# Patient Record
Sex: FEMALE | Race: WHITE | Employment: UNEMPLOYED | URBAN - METROPOLITAN AREA
[De-identification: names, ages, dates, MRNs, and addresses within clinical notes are randomized per-mention and may not be internally consistent; named-entity substitution may affect disease eponyms.]

---

## 2017-10-24 PROBLEM — I21.4 NSTEMI (NON-ST ELEVATED MYOCARDIAL INFARCTION) (HCC): Status: ACTIVE | Noted: 2017-10-24

## 2017-11-16 ENCOUNTER — TELEPHONE (OUTPATIENT)
Dept: CARDIOTHORACIC SURGERY | Age: 52
End: 2017-11-16

## 2017-11-16 NOTE — TELEPHONE ENCOUNTER
Patient underwent CABG x6 on 10/30/17 with Dr. Crescencio Fuchs. She was discharged home with home care on 11/8/17. I called her today for her surgical follow up. She reports that she is doing well. Denies SOB, chest pain, or palpitations. Denies edema or fever. She wears her TERESITA hose. She is using her IS and get it up to 1250. I encouraged continued use of this as well as deep breathing. Having regular bowel movement, glucose levels WNL Her only complaint is that food just doesn't taste right and so she's lost some weight since this all happened. She does report that she is still eating though and getting adequate nutrition. Pain is well controlled with her current medications and she reports she's sleeping well. No further concerns or questions at this time. Follow up scheduled with Dr. Crescencio Fuchs for tomorrow.

## 2017-11-17 ENCOUNTER — OFFICE VISIT (OUTPATIENT)
Dept: CARDIOTHORACIC SURGERY | Age: 52
End: 2017-11-17

## 2017-11-17 VITALS
DIASTOLIC BLOOD PRESSURE: 66 MMHG | TEMPERATURE: 97.5 F | HEIGHT: 67 IN | HEART RATE: 97 BPM | WEIGHT: 176.4 LBS | OXYGEN SATURATION: 98 % | BODY MASS INDEX: 27.69 KG/M2 | SYSTOLIC BLOOD PRESSURE: 118 MMHG

## 2017-11-17 DIAGNOSIS — Z09 POSTOP CHECK: Primary | ICD-10-CM

## 2017-11-17 DIAGNOSIS — G89.18 POST-OP PAIN: Primary | ICD-10-CM

## 2017-11-17 PROCEDURE — 99024 POSTOP FOLLOW-UP VISIT: CPT | Performed by: THORACIC SURGERY (CARDIOTHORACIC VASCULAR SURGERY)

## 2017-11-17 RX ORDER — OXYCODONE HYDROCHLORIDE 5 MG/1
5 TABLET ORAL EVERY 6 HOURS PRN
Qty: 28 TABLET | Refills: 0 | Status: SHIPPED | OUTPATIENT
Start: 2017-11-17 | End: 2017-12-08

## 2017-11-17 RX ORDER — OXYCODONE HYDROCHLORIDE 5 MG/1
5 TABLET ORAL EVERY 4 HOURS PRN
COMMUNITY
End: 2017-12-08

## 2017-11-17 NOTE — PROGRESS NOTES
Progress Note    S/P CABGx6 10/30/17  MSI, CT sites x3 and right leg incision are healing without redness or purulence. CT sutures x3 removed without difficulty. Needs to make an appointment with Cardiology. Has not resumed smoking. Vital Signs:                                                 /66 (Site: Left Arm, Position: Sitting)   Pulse 97   Temp 97.5 °F (36.4 °C) (Oral)   Ht 5' 7\" (1.702 m)   Wt 176 lb 6.4 oz (80 kg)   SpO2 98%   BMI 27.63 kg/m²      Preop weight: 185 lb    CV: reg, wound c/d/i, sternum stable  Pulm: clear, decreased at bases  Abd: soft  Ext: warm. No edema. saph incision c/d/i. Medications:  Prior to Admission medications    Medication Sig Start Date End Date Taking? Authorizing Provider   oxyCODONE (ROXICODONE) 5 MG immediate release tablet Take 5 mg by mouth every 4 hours as needed for Pain . Yes Historical Provider, MD   acetaminophen (TYLENOL) 325 MG tablet Take 2 tablets by mouth every 4 hours as needed for Pain 11/3/17  Yes JEREMY Gomez   aspirin 325 MG EC tablet Take 1 tablet by mouth daily 11/4/17  Yes JEREMY Gomez   atorvastatin (LIPITOR) 40 MG tablet Take 1 tablet by mouth nightly 11/3/17  Yes JEREMY Gomez   docusate sodium (COLACE, DULCOLAX) 100 MG CAPS Take 100 mg by mouth 2 times daily as needed for Constipation 11/3/17  Yes JEREMY Gomez   famotidine (PEPCID) 20 MG tablet Take 1 tablet by mouth 2 times daily 11/3/17 12/3/17 Yes JEREMY Gomez   vilazodone HCl (VILAZODONE HCL) 40 MG TABS Take 40 mg by mouth daily   Yes Historical Provider, MD   sitaGLIPtan-metformin (JANUMET)  MG per tablet Take 1 tablet by mouth 2 times daily (with meals) 9/28/16  Yes Deja Camacho NP   levothyroxine (SYNTHROID) 200 MCG tablet Take 225 mcg by mouth daily. Yes Historical Provider, MD   glimepiride (AMARYL) 4 MG tablet Take 4 mg by mouth 2 times daily.    Yes Historical Provider, MD        Data Review:  CBC:   Lab Results   Component Value Date    WBC 11.7 11/08/2017    HGB 8.7 11/08/2017    HCT 26.5 11/08/2017    MCV 89.6 11/08/2017     11/08/2017     BMP:   Lab Results   Component Value Date     11/08/2017    K 3.8 11/08/2017    CL 93 11/08/2017    CO2 33 11/08/2017    BUN 15 11/08/2017    CREATININE 0.8 11/08/2017    CALCIUM 8.8 11/08/2017    MG 2.20 11/08/2017     PT/INR:   Lab Results   Component Value Date    PROTIME 13.4 11/08/2017    INR 1.19 11/08/2017         Assessment/Plan:  - lifting limit 5 lbs 11/30, then 10 lbs  - ok to resume driving  - can dispense with wearing compression hose  - cont , statin in keeping AHA guidelines for postcabg pts  - resume BB when bp/hr warrant  - no need to refill colace, pepcid  - refill pain med once more. Using sparingly.   - follow up 3 weeks    Talia Long MD  11/17/2017  9:53 AM

## 2017-11-17 NOTE — PATIENT INSTRUCTIONS
PREVENTION OF RECURRENT ATHEROSCLEROSIS:  A MESSAGE TO PATIENTS AND THEIR LOVED ONES FROM YOUR SURGEON    You have recently had successful surgery for atherosclerosis. Now your real work begins. Atherosclerosis (hardening of the arteries by cholesterol and fat deposits) can be slowed or stopped from further blocking your own arteries or the new bypass grafts by following \"risk factor management\". If you follow these principles carefully, you can reduce your chances of having heart attacks, strokes, and the need for future surgery. Your cardiologist and primary care doctor should follow these concepts, but your surgeons believe that this is so important that we want you to begin thinking about and following these concepts now. These are the important risk factors you and your doctors should follow carefully. The marked ones apply to YOU:    [x] SMOKING: Absolutely positively never smoke again. Keep your home and work place smoke­ free. Your doctors can prescribe patches, gum or other medications to help. [x] BLOOD PRESSURE CONTROL: Your blood pressure should be less than 140/90. If you have diabetes or kidney disease, your blood pressure should be less than 130/80. Weight reduction, exercise and medications can help you control high blood pressure. [x] CHOLESTEROL AND FATS: A diet low in saturated fat (< 7%) and low in cholesterol (< 200 mg/day), and weight reduction, and exercise, and medications as necessary can help you achieve these goals:  Low density (bad) cholesterol: <70 mg/dL (the lower, the better)   Triglycerides: <150 mg/dL (the lower, the better)   High density (good) cholesterol: >40 mg/dL (the higher, the better)  Make an appointment to see your primary care physician, Dr. Anca Bruner 2 months after your surgery to check your cholesterol profile.   [x] EXERCISE: Your cardiac rehabilitation program will help you work up to a regular make you sweat\" program of at least 30 minutes, at least 6 times per week, preferably daily. Make an appointment with your cardiologist Dr. Helio Persaud 3-4 weeks after your surgery. [x] WEIGHT REDUCTION: Your ideal body mass index is 18.5-24.9 kg/m2. Your body mass index is Body mass index is 27.63 kg/m². . You may calculate this by using the following formula: (weight in pounds X 0.45) / (height in inches X 0.0254) squared. A waist circumference of < 40 inches for men and < 35 inches for women is recommended. A 10% weight reduction can lower your risk of future events. [x] DIABETES: Your HbA1c should be <7%. Diet, exercise, weight reduction and proper medications can reduce your body's tendency toward high blood sugar. Check with your PCP for assistance. [x] PROPER MEDICATIONS:  [x] Aspirin  [] ACE inhibitor / ARB  (-opril / -sartan)  [] Beta-blocker (-olol or -ilol)  [x] Statin    Risk factor management works. The person for whom this is most important is YOU. Post this information on your refrigerator or other prominent place as a reminder to you. Please call or write if you have further questions. We want you and your operation to last for many more years.     MAY LIFT MORE THAN 5 LBS ON 11/30/17

## 2017-11-17 NOTE — TELEPHONE ENCOUNTER
Pt is s/p CABGx6 10/30/17 and is requesting a refill on Oxycodone 5 mg. This will be patient's first refill. Prescription routed to Dr. Ronda Cavanaugh to sign.

## 2017-12-08 ENCOUNTER — OFFICE VISIT (OUTPATIENT)
Dept: CARDIOTHORACIC SURGERY | Age: 52
End: 2017-12-08

## 2017-12-08 VITALS
OXYGEN SATURATION: 98 % | WEIGHT: 180.4 LBS | SYSTOLIC BLOOD PRESSURE: 118 MMHG | BODY MASS INDEX: 27.34 KG/M2 | HEART RATE: 105 BPM | DIASTOLIC BLOOD PRESSURE: 78 MMHG | HEIGHT: 68 IN | TEMPERATURE: 97.7 F

## 2017-12-08 DIAGNOSIS — Z09 POSTOP CHECK: Primary | ICD-10-CM

## 2017-12-08 PROCEDURE — 99024 POSTOP FOLLOW-UP VISIT: CPT | Performed by: THORACIC SURGERY (CARDIOTHORACIC VASCULAR SURGERY)

## 2017-12-19 ENCOUNTER — OFFICE VISIT (OUTPATIENT)
Dept: CARDIOLOGY CLINIC | Age: 52
End: 2017-12-19

## 2017-12-19 VITALS
BODY MASS INDEX: 27.58 KG/M2 | WEIGHT: 182 LBS | DIASTOLIC BLOOD PRESSURE: 60 MMHG | SYSTOLIC BLOOD PRESSURE: 128 MMHG | HEIGHT: 68 IN | OXYGEN SATURATION: 97 % | HEART RATE: 98 BPM

## 2017-12-19 DIAGNOSIS — I25.10 CORONARY ARTERY DISEASE INVOLVING NATIVE CORONARY ARTERY OF NATIVE HEART WITHOUT ANGINA PECTORIS: Primary | Chronic | ICD-10-CM

## 2017-12-19 DIAGNOSIS — E78.5 DYSLIPIDEMIA: ICD-10-CM

## 2017-12-19 DIAGNOSIS — Z95.1 S/P CABG X 6: ICD-10-CM

## 2017-12-19 DIAGNOSIS — I10 ESSENTIAL HYPERTENSION: ICD-10-CM

## 2017-12-19 PROCEDURE — 99214 OFFICE O/P EST MOD 30 MIN: CPT | Performed by: INTERNAL MEDICINE

## 2017-12-19 RX ORDER — METOPROLOL SUCCINATE 25 MG/1
25 TABLET, EXTENDED RELEASE ORAL DAILY
Qty: 30 TABLET | Refills: 3 | Status: SHIPPED | OUTPATIENT
Start: 2017-12-19 | End: 2021-08-11

## 2017-12-19 ASSESSMENT — ENCOUNTER SYMPTOMS
SHORTNESS OF BREATH: 0
COUGH: 0
ABDOMINAL PAIN: 0
COLOR CHANGE: 0
EYE PAIN: 0
EYE REDNESS: 0
BLOOD IN STOOL: 0
WHEEZING: 0
CHEST TIGHTNESS: 0

## 2017-12-19 NOTE — LETTER
factor modification. Recommend cardiac rehab. Advised to try Cortizone cream for rash. Fasting lipid profile, CMP prior to next visit. If you have questions, please do not hesitate to call me. I look forward to following Albino along with you.     Sincerely,        Julio Ron MD

## 2017-12-19 NOTE — PROGRESS NOTES
tachycardia, will add Toprol XL 25mg daily. Continue  ongoing risk factor modification. Recommend cardiac rehab. Advised to try Cortizone cream for rash. Fasting lipid profile, CMP prior to next visit.

## 2018-03-22 ENCOUNTER — OFFICE VISIT (OUTPATIENT)
Dept: CARDIOLOGY CLINIC | Age: 53
End: 2018-03-22

## 2018-03-22 VITALS
HEIGHT: 69 IN | WEIGHT: 194 LBS | HEART RATE: 94 BPM | OXYGEN SATURATION: 94 % | DIASTOLIC BLOOD PRESSURE: 60 MMHG | BODY MASS INDEX: 28.73 KG/M2 | SYSTOLIC BLOOD PRESSURE: 120 MMHG

## 2018-03-22 DIAGNOSIS — I25.10 CORONARY ARTERY DISEASE INVOLVING NATIVE CORONARY ARTERY OF NATIVE HEART WITHOUT ANGINA PECTORIS: Primary | Chronic | ICD-10-CM

## 2018-03-22 DIAGNOSIS — I10 ESSENTIAL HYPERTENSION: ICD-10-CM

## 2018-03-22 DIAGNOSIS — E78.00 PURE HYPERCHOLESTEROLEMIA: ICD-10-CM

## 2018-03-22 PROCEDURE — 99214 OFFICE O/P EST MOD 30 MIN: CPT | Performed by: INTERNAL MEDICINE

## 2018-03-22 ASSESSMENT — ENCOUNTER SYMPTOMS
ABDOMINAL PAIN: 0
EYE PAIN: 0
WHEEZING: 0
SHORTNESS OF BREATH: 1
CHEST TIGHTNESS: 0
COUGH: 0
EYE REDNESS: 0
BLOOD IN STOOL: 0
COLOR CHANGE: 0

## 2018-04-06 ENCOUNTER — TELEPHONE (OUTPATIENT)
Dept: CARDIOLOGY CLINIC | Age: 53
End: 2018-04-06

## 2018-09-06 LAB
AVERAGE GLUCOSE: NORMAL
HBA1C MFR BLD: 11.7 %

## 2021-08-11 ENCOUNTER — OFFICE VISIT (OUTPATIENT)
Dept: CARDIOLOGY CLINIC | Age: 56
End: 2021-08-11
Payer: COMMERCIAL

## 2021-08-11 VITALS
DIASTOLIC BLOOD PRESSURE: 86 MMHG | WEIGHT: 179.8 LBS | OXYGEN SATURATION: 97 % | HEART RATE: 92 BPM | SYSTOLIC BLOOD PRESSURE: 136 MMHG | BODY MASS INDEX: 26.63 KG/M2 | HEIGHT: 69 IN

## 2021-08-11 DIAGNOSIS — I25.10 CORONARY ARTERY DISEASE INVOLVING NATIVE CORONARY ARTERY OF NATIVE HEART WITHOUT ANGINA PECTORIS: Primary | ICD-10-CM

## 2021-08-11 DIAGNOSIS — E78.00 PURE HYPERCHOLESTEROLEMIA: ICD-10-CM

## 2021-08-11 DIAGNOSIS — I21.4 NSTEMI (NON-ST ELEVATED MYOCARDIAL INFARCTION) (HCC): ICD-10-CM

## 2021-08-11 DIAGNOSIS — Z95.1 S/P CABG X 6: ICD-10-CM

## 2021-08-11 PROCEDURE — 93000 ELECTROCARDIOGRAM COMPLETE: CPT | Performed by: INTERNAL MEDICINE

## 2021-08-11 PROCEDURE — 99214 OFFICE O/P EST MOD 30 MIN: CPT | Performed by: INTERNAL MEDICINE

## 2021-08-11 RX ORDER — INSULIN DEGLUDEC 200 U/ML
INJECTION, SOLUTION SUBCUTANEOUS
COMMUNITY
Start: 2021-06-19

## 2021-08-11 RX ORDER — ATORVASTATIN CALCIUM 80 MG/1
80 TABLET, FILM COATED ORAL DAILY
COMMUNITY
End: 2021-08-11

## 2021-08-11 RX ORDER — ROSUVASTATIN CALCIUM 40 MG/1
40 TABLET, COATED ORAL DAILY
Qty: 90 TABLET | Refills: 3 | Status: SHIPPED | OUTPATIENT
Start: 2021-08-11

## 2021-08-11 RX ORDER — BUSPIRONE HYDROCHLORIDE 5 MG/1
5 TABLET ORAL DAILY PRN
COMMUNITY
Start: 2021-03-23

## 2021-08-11 RX ORDER — ADALIMUMAB 10MG/0.1ML
KIT SUBCUTANEOUS
COMMUNITY

## 2021-08-11 RX ORDER — DULAGLUTIDE 1.5 MG/.5ML
INJECTION, SOLUTION SUBCUTANEOUS
COMMUNITY
Start: 2021-06-20

## 2021-08-11 RX ORDER — SULFAMETHOXAZOLE AND TRIMETHOPRIM 800; 160 MG/1; MG/1
1 TABLET ORAL 2 TIMES DAILY
COMMUNITY
Start: 2021-08-07

## 2021-08-11 RX ORDER — LISINOPRIL 5 MG/1
5 TABLET ORAL DAILY
Qty: 90 TABLET | Refills: 3 | Status: SHIPPED | OUTPATIENT
Start: 2021-08-11

## 2021-08-11 RX ORDER — INSULIN ASPART 100 [IU]/ML
INJECTION, SOLUTION INTRAVENOUS; SUBCUTANEOUS
COMMUNITY
Start: 2020-12-01

## 2021-08-11 RX ORDER — ASPIRIN 81 MG/1
81 TABLET ORAL DAILY
Qty: 90 TABLET | Refills: 0 | Status: SHIPPED | OUTPATIENT
Start: 2021-08-11

## 2021-08-11 RX ORDER — LEVOTHYROXINE SODIUM 112 UG/1
TABLET ORAL
COMMUNITY
Start: 2021-07-27

## 2021-08-11 RX ORDER — VILAZODONE HYDROCHLORIDE 40 MG/1
TABLET ORAL
COMMUNITY
Start: 2021-07-27
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
## 2021-08-11 NOTE — PATIENT INSTRUCTIONS
Patient Education     REDUCE ASPIRIN TO 81MG DAILY    SWITCH ATORVASTATIN TO CRESTOR 40MG NIGHTLY. START LISINOPRIL 5MG DAILY. Learning About Coronary Artery Disease (CAD)  What is coronary artery disease? Coronary artery disease is a condition that occurs when plaque builds up in the arteries that bring oxygen-rich blood to your heart. Plaque is a fatty substance made of cholesterol, calcium, and other substances in the blood. This process is called hardening of the arteries, or atherosclerosis. What happens when you have coronary artery disease? · Plaque may narrow the coronary arteries. Narrowed arteries cause poor blood flow. This can lead to angina symptoms such as chest pain or discomfort. If blood flow is completely blocked, you could have a heart attack. · You can slow and reduce the risk of future problems by making changes in your lifestyle. These include quitting smoking and eating heart-healthy foods. · Treatment, along with changes in your lifestyle, can help you live a longer and healthier life. How can you prevent coronary artery disease? · Do not smoke. It may be the best thing you can do to prevent coronary artery disease. If you need help quitting, talk to your doctor about stop-smoking programs and medicines. These can increase your chances of quitting for good. · Be active. Try to do moderate activity at least 2½ hours a week. Or try to do vigorous activity at least 1¼ hours a week. You may want to walk or try other activities, such as running, swimming, cycling, or playing tennis or team sports. · Eat heart-healthy foods. Eat more fruits and vegetables and less food that contains saturated and trans fats. Limit alcohol, sodium, and sweets. · Stay at a healthy weight. Lose weight if you need to. · Manage other health problems such as diabetes, high blood pressure, and high cholesterol. How is coronary artery disease treated?   · Your doctor will suggest that you make lifestyle changes. For example, your doctor may ask you to eat healthy foods, quit smoking, lose extra weight, and be more active. · You will take medicines that help prevent a heart attack. · Your doctor may suggest a procedure to open narrowed or blocked arteries. This is called angioplasty. Or your doctor may suggest using healthy blood vessels to create detours around narrowed or blocked arteries. This is called bypass surgery. Follow-up care is a key part of your treatment and safety. Be sure to make and go to all appointments, and call your doctor if you are having problems. It's also a good idea to know your test results and keep a list of the medicines you take. Where can you learn more? Go to https://[employer].Double[employer]. org and sign in to your [employer] account. Enter [phone] [id number] in the [employer] box to learn more about \"Learning About Coronary Artery Disease (CAD). \"     If you do not have an account, please click on the \"Sign Up Now\" link. Current as of: August 31, 2020               Content Version: 12.9  © 3381-5530 Healthwise, Incorporated. Care instructions adapted under license by [facility] ([facility]). If you have questions about a medical condition or this instruction, always ask your healthcare professional. [address] any warranty or liability for your use of this information.

## 2021-09-24 DIAGNOSIS — E78.00 PURE HYPERCHOLESTEROLEMIA: Primary | ICD-10-CM

## 2024-01-29 ENCOUNTER — HOSPITAL ENCOUNTER (INPATIENT)
Age: 59
LOS: 4 days | Discharge: HOME | End: 2024-02-02
Attending: STUDENT IN AN ORGANIZED HEALTH CARE EDUCATION/TRAINING PROGRAM | Admitting: ANESTHESIOLOGY
Payer: COMMERCIAL

## 2024-01-29 ENCOUNTER — APPOINTMENT (OUTPATIENT)
Dept: GENERAL RADIOLOGY | Age: 59
End: 2024-01-29
Payer: COMMERCIAL

## 2024-01-29 DIAGNOSIS — M79.602 PAIN OF LEFT UPPER EXTREMITY: ICD-10-CM

## 2024-01-29 DIAGNOSIS — R07.9 CHEST PAIN, UNSPECIFIED TYPE: Primary | ICD-10-CM

## 2024-01-29 LAB
ALBUMIN SERPL-MCNC: 4.1 G/DL (ref 3.4–5)
ALBUMIN/GLOB SERPL: 1.2 {RATIO} (ref 1.1–2.2)
ALP SERPL-CCNC: 109 U/L (ref 40–129)
ALT SERPL-CCNC: 19 U/L (ref 10–40)
ANION GAP SERPL CALCULATED.3IONS-SCNC: 7 MMOL/L (ref 3–16)
ANTI-XA UNFRAC HEPARIN: <0.1 IU/ML (ref 0.3–0.7)
APTT BLD: 28.1 SEC (ref 22.7–35.9)
AST SERPL-CCNC: 18 U/L (ref 15–37)
BASOPHILS # BLD: 0.1 K/UL (ref 0–0.2)
BASOPHILS NFR BLD: 1.1 %
BILIRUB SERPL-MCNC: <0.2 MG/DL (ref 0–1)
BUN SERPL-MCNC: 21 MG/DL (ref 7–20)
CALCIUM SERPL-MCNC: 9.5 MG/DL (ref 8.3–10.6)
CHLORIDE SERPL-SCNC: 98 MMOL/L (ref 99–110)
CO2 SERPL-SCNC: 29 MMOL/L (ref 21–32)
CREAT SERPL-MCNC: 1.1 MG/DL (ref 0.6–1.1)
DEPRECATED RDW RBC AUTO: 14.3 % (ref 12.4–15.4)
EOSINOPHIL # BLD: 0.4 K/UL (ref 0–0.6)
EOSINOPHIL NFR BLD: 2.9 %
GFR SERPLBLD CREATININE-BSD FMLA CKD-EPI: 58 ML/MIN/{1.73_M2}
GLUCOSE SERPL-MCNC: 152 MG/DL (ref 70–99)
HCT VFR BLD AUTO: 43.1 % (ref 36–48)
HGB BLD-MCNC: 14.2 G/DL (ref 12–16)
LYMPHOCYTES # BLD: 3.1 K/UL (ref 1–5.1)
LYMPHOCYTES NFR BLD: 25.7 %
MCH RBC QN AUTO: 29.3 PG (ref 26–34)
MCHC RBC AUTO-ENTMCNC: 32.9 G/DL (ref 31–36)
MCV RBC AUTO: 89 FL (ref 80–100)
MONOCYTES # BLD: 0.8 K/UL (ref 0–1.3)
MONOCYTES NFR BLD: 6.3 %
NEUTROPHILS # BLD: 7.7 K/UL (ref 1.7–7.7)
NEUTROPHILS NFR BLD: 64 %
PLATELET # BLD AUTO: 383 K/UL (ref 135–450)
PMV BLD AUTO: 7.5 FL (ref 5–10.5)
POTASSIUM SERPL-SCNC: 3.9 MMOL/L (ref 3.5–5.1)
PROT SERPL-MCNC: 7.4 G/DL (ref 6.4–8.2)
RBC # BLD AUTO: 4.84 M/UL (ref 4–5.2)
SODIUM SERPL-SCNC: 134 MMOL/L (ref 136–145)
TROPONIN, HIGH SENSITIVITY: 19 NG/L (ref 0–14)
TROPONIN, HIGH SENSITIVITY: 20 NG/L (ref 0–14)
WBC # BLD AUTO: 12.1 K/UL (ref 4–11)

## 2024-01-29 PROCEDURE — 1200000000 HC SEMI PRIVATE

## 2024-01-29 PROCEDURE — 84484 ASSAY OF TROPONIN QUANT: CPT

## 2024-01-29 PROCEDURE — 96365 THER/PROPH/DIAG IV INF INIT: CPT

## 2024-01-29 PROCEDURE — 85730 THROMBOPLASTIN TIME PARTIAL: CPT

## 2024-01-29 PROCEDURE — 71046 X-RAY EXAM CHEST 2 VIEWS: CPT

## 2024-01-29 PROCEDURE — 85520 HEPARIN ASSAY: CPT

## 2024-01-29 PROCEDURE — 6370000000 HC RX 637 (ALT 250 FOR IP): Performed by: ANESTHESIOLOGY

## 2024-01-29 PROCEDURE — 99285 EMERGENCY DEPT VISIT HI MDM: CPT

## 2024-01-29 PROCEDURE — 6370000000 HC RX 637 (ALT 250 FOR IP): Performed by: STUDENT IN AN ORGANIZED HEALTH CARE EDUCATION/TRAINING PROGRAM

## 2024-01-29 PROCEDURE — 36415 COLL VENOUS BLD VENIPUNCTURE: CPT

## 2024-01-29 PROCEDURE — 85025 COMPLETE CBC W/AUTO DIFF WBC: CPT

## 2024-01-29 PROCEDURE — 6360000002 HC RX W HCPCS: Performed by: STUDENT IN AN ORGANIZED HEALTH CARE EDUCATION/TRAINING PROGRAM

## 2024-01-29 PROCEDURE — 93005 ELECTROCARDIOGRAM TRACING: CPT | Performed by: STUDENT IN AN ORGANIZED HEALTH CARE EDUCATION/TRAINING PROGRAM

## 2024-01-29 PROCEDURE — 80053 COMPREHEN METABOLIC PANEL: CPT

## 2024-01-29 RX ORDER — LIOTHYRONINE SODIUM 25 UG/1
25 TABLET ORAL DAILY
COMMUNITY

## 2024-01-29 RX ORDER — HEPARIN SODIUM 10000 [USP'U]/100ML
0-4000 INJECTION, SOLUTION INTRAVENOUS CONTINUOUS
Status: DISCONTINUED | OUTPATIENT
Start: 2024-01-29 | End: 2024-02-01

## 2024-01-29 RX ORDER — SODIUM CHLORIDE 9 MG/ML
INJECTION, SOLUTION INTRAVENOUS PRN
Status: DISCONTINUED | OUTPATIENT
Start: 2024-01-29 | End: 2024-02-01 | Stop reason: SDUPTHER

## 2024-01-29 RX ORDER — POLYETHYLENE GLYCOL 3350 17 G/17G
17 POWDER, FOR SOLUTION ORAL DAILY PRN
Status: DISCONTINUED | OUTPATIENT
Start: 2024-01-29 | End: 2024-02-02 | Stop reason: HOSPADM

## 2024-01-29 RX ORDER — ROSUVASTATIN CALCIUM 40 MG/1
40 TABLET, COATED ORAL DAILY
Status: DISCONTINUED | OUTPATIENT
Start: 2024-01-30 | End: 2024-02-02 | Stop reason: HOSPADM

## 2024-01-29 RX ORDER — LISINOPRIL 5 MG/1
5 TABLET ORAL DAILY
Status: DISCONTINUED | OUTPATIENT
Start: 2024-01-30 | End: 2024-02-02 | Stop reason: HOSPADM

## 2024-01-29 RX ORDER — PIOGLITAZONEHYDROCHLORIDE 30 MG/1
30 TABLET ORAL DAILY
COMMUNITY

## 2024-01-29 RX ORDER — ONDANSETRON 4 MG/1
4 TABLET, ORALLY DISINTEGRATING ORAL EVERY 8 HOURS PRN
Status: DISCONTINUED | OUTPATIENT
Start: 2024-01-29 | End: 2024-02-02 | Stop reason: HOSPADM

## 2024-01-29 RX ORDER — HEPARIN SODIUM 1000 [USP'U]/ML
4000 INJECTION, SOLUTION INTRAVENOUS; SUBCUTANEOUS ONCE
Status: COMPLETED | OUTPATIENT
Start: 2024-01-29 | End: 2024-01-29

## 2024-01-29 RX ORDER — ONDANSETRON 2 MG/ML
4 INJECTION INTRAMUSCULAR; INTRAVENOUS EVERY 6 HOURS PRN
Status: DISCONTINUED | OUTPATIENT
Start: 2024-01-29 | End: 2024-02-02 | Stop reason: HOSPADM

## 2024-01-29 RX ORDER — ASPIRIN 81 MG/1
324 TABLET, CHEWABLE ORAL ONCE
Status: COMPLETED | OUTPATIENT
Start: 2024-01-29 | End: 2024-01-29

## 2024-01-29 RX ORDER — GLUCAGON 1 MG/ML
1 KIT INJECTION PRN
Status: DISCONTINUED | OUTPATIENT
Start: 2024-01-29 | End: 2024-02-02 | Stop reason: HOSPADM

## 2024-01-29 RX ORDER — MAGNESIUM SULFATE IN WATER 40 MG/ML
2000 INJECTION, SOLUTION INTRAVENOUS PRN
Status: DISCONTINUED | OUTPATIENT
Start: 2024-01-29 | End: 2024-02-02 | Stop reason: HOSPADM

## 2024-01-29 RX ORDER — INSULIN LISPRO 100 [IU]/ML
0-8 INJECTION, SOLUTION INTRAVENOUS; SUBCUTANEOUS
Status: DISCONTINUED | OUTPATIENT
Start: 2024-01-30 | End: 2024-02-02 | Stop reason: HOSPADM

## 2024-01-29 RX ORDER — ASPIRIN 81 MG/1
81 TABLET ORAL DAILY
Status: DISCONTINUED | OUTPATIENT
Start: 2024-01-30 | End: 2024-02-02 | Stop reason: HOSPADM

## 2024-01-29 RX ORDER — LIOTHYRONINE SODIUM 25 UG/1
25 TABLET ORAL DAILY
Status: DISCONTINUED | OUTPATIENT
Start: 2024-01-30 | End: 2024-02-02 | Stop reason: HOSPADM

## 2024-01-29 RX ORDER — ACETAMINOPHEN 650 MG/1
650 SUPPOSITORY RECTAL EVERY 6 HOURS PRN
Status: DISCONTINUED | OUTPATIENT
Start: 2024-01-29 | End: 2024-02-02 | Stop reason: HOSPADM

## 2024-01-29 RX ORDER — SODIUM CHLORIDE 0.9 % (FLUSH) 0.9 %
5-40 SYRINGE (ML) INJECTION EVERY 12 HOURS SCHEDULED
Status: DISCONTINUED | OUTPATIENT
Start: 2024-01-29 | End: 2024-02-01 | Stop reason: SDUPTHER

## 2024-01-29 RX ORDER — INSULIN LISPRO 100 [IU]/ML
0-4 INJECTION, SOLUTION INTRAVENOUS; SUBCUTANEOUS NIGHTLY
Status: DISCONTINUED | OUTPATIENT
Start: 2024-01-29 | End: 2024-02-02 | Stop reason: HOSPADM

## 2024-01-29 RX ORDER — POTASSIUM CHLORIDE 7.45 MG/ML
10 INJECTION INTRAVENOUS PRN
Status: DISCONTINUED | OUTPATIENT
Start: 2024-01-29 | End: 2024-02-02 | Stop reason: HOSPADM

## 2024-01-29 RX ORDER — HEPARIN SODIUM 1000 [USP'U]/ML
60 INJECTION, SOLUTION INTRAVENOUS; SUBCUTANEOUS PRN
Status: DISCONTINUED | OUTPATIENT
Start: 2024-01-29 | End: 2024-01-29

## 2024-01-29 RX ORDER — VILAZODONE HYDROCHLORIDE 40 MG/1
40 TABLET ORAL DAILY
Status: DISCONTINUED | OUTPATIENT
Start: 2024-01-30 | End: 2024-02-02 | Stop reason: HOSPADM

## 2024-01-29 RX ORDER — ACETAMINOPHEN 325 MG/1
650 TABLET ORAL EVERY 6 HOURS PRN
Status: DISCONTINUED | OUTPATIENT
Start: 2024-01-29 | End: 2024-02-02 | Stop reason: HOSPADM

## 2024-01-29 RX ORDER — ERGOCALCIFEROL 1.25 MG/1
50000 CAPSULE ORAL
COMMUNITY

## 2024-01-29 RX ORDER — SODIUM CHLORIDE 0.9 % (FLUSH) 0.9 %
5-40 SYRINGE (ML) INJECTION PRN
Status: DISCONTINUED | OUTPATIENT
Start: 2024-01-29 | End: 2024-02-01 | Stop reason: SDUPTHER

## 2024-01-29 RX ORDER — DEXTROSE MONOHYDRATE 100 MG/ML
INJECTION, SOLUTION INTRAVENOUS CONTINUOUS PRN
Status: DISCONTINUED | OUTPATIENT
Start: 2024-01-29 | End: 2024-02-02 | Stop reason: HOSPADM

## 2024-01-29 RX ORDER — SODIUM CHLORIDE 9 MG/ML
INJECTION, SOLUTION INTRAVENOUS CONTINUOUS
Status: ACTIVE | OUTPATIENT
Start: 2024-01-29 | End: 2024-01-30

## 2024-01-29 RX ORDER — HEPARIN SODIUM 1000 [USP'U]/ML
30 INJECTION, SOLUTION INTRAVENOUS; SUBCUTANEOUS PRN
Status: DISCONTINUED | OUTPATIENT
Start: 2024-01-29 | End: 2024-01-29

## 2024-01-29 RX ORDER — POTASSIUM CHLORIDE 20 MEQ/1
40 TABLET, EXTENDED RELEASE ORAL PRN
Status: DISCONTINUED | OUTPATIENT
Start: 2024-01-29 | End: 2024-02-02 | Stop reason: HOSPADM

## 2024-01-29 RX ADMIN — METOPROLOL TARTRATE 12.5 MG: 25 TABLET, FILM COATED ORAL at 23:08

## 2024-01-29 RX ADMIN — ASPIRIN 81 MG 324 MG: 81 TABLET ORAL at 18:59

## 2024-01-29 RX ADMIN — NITROGLYCERIN 0.5 INCH: 20 OINTMENT TOPICAL at 21:46

## 2024-01-29 RX ADMIN — HEPARIN SODIUM 1000 UNITS/HR: 10000 INJECTION, SOLUTION INTRAVENOUS at 21:50

## 2024-01-29 RX ADMIN — HEPARIN SODIUM 4000 UNITS: 1000 INJECTION INTRAVENOUS; SUBCUTANEOUS at 21:49

## 2024-01-29 ASSESSMENT — PAIN SCALES - GENERAL: PAINLEVEL_OUTOF10: 0

## 2024-01-29 ASSESSMENT — LIFESTYLE VARIABLES
HOW MANY STANDARD DRINKS CONTAINING ALCOHOL DO YOU HAVE ON A TYPICAL DAY: PATIENT DOES NOT DRINK
HOW OFTEN DO YOU HAVE A DRINK CONTAINING ALCOHOL: NEVER

## 2024-01-29 NOTE — ED TRIAGE NOTES
Pt into ER from home with c/c CP onset 6 hours ago, on and off with radiation down left arm.  Pt denies SOB, nausea.  Pt with Hx of MI and feels similar.

## 2024-01-29 NOTE — ED PROVIDER NOTES
[facility]    CHIEF COMPLAINT  Chest Pain (CP onset 6 hours ago, on and off with radiation down left arm.  Pt denies SOB, nausea.  Pt with Hx of MI and feels similar.  )       HISTORY OF PRESENT ILLNESS  Albino Uriostegui is a 58 y.o. female presenting to the ED for chest pain.  Onset of chest pain started at 11:30 AM.  Patient states she has a heavy substernal chest pain.  Patient describes chest pain as a squeezing sensation.  Patient states every time she develops chest pain she has a heavy sensation that radiates down her left arm.  Patient denies any shortness of breath.  Patient states she has a history of stent placement and CABG.  Patient still smokes half a pack a day.  Patient denies any nausea, vomiting, abdominal pain, upper respiratory symptoms, urinary symptoms, dizziness, vertigo, lightheadedness.    - History obtained from: Patient  - Limitations to history: None    I have reviewed the following from the nursing documentation:    Past Medical History:   Diagnosis Date    CAD (coronary artery disease)     multiple TIESHA    Chest pain     Nuc GXT 8/22/12 normal.  Echo 8/21/12 LVEF 60-65%    Depression     Diabetes mellitus (HCC)     managed by PCP    Family history of early CAD     Female pelvic pain 2009    HTN (hypertension)     controlled    Hyperlipidemia     rec semi annual lipids with LDL goal <70    Hypothyroidism     managed by PCP    Rectal bleeding 12/31/2014    Tobacco user     cessation discussed     Past Surgical History:   Procedure Laterality Date    CHOLECYSTECTOMY      CORONARY ANGIOPLASTY WITH STENT PLACEMENT      9/7/12: TIESHA ramus/lst diag, mid circ, 5/8/15: TIESHA to mid and prox LAD and left circ, 8/24/16: TIESHA to prox circ, 9/29/16: staged PCI to distal LAD and PDA    CORONARY ARTERY BYPASS GRAFT  10/30/2017    cabgx6 (Love)    CORONARY ARTERY BYPASS GRAFT      x 6    HYSTERECTOMY (CERVIX STATUS UNKNOWN)       Family History   Problem Relation Age of Onset    Heart Disease Mother  cardiopulmonary pathology.                My ECG interpretation  Normal sinus rhythm, ventricular rate of 82, right bundle branch block, left anterior fascicular block, left axis deviation, patient has ST depression in lateral leads which is consistent with prior EKG from 8/11/2021.  Patient also shown to have ST elevation in aVR which is consistent with prior EKG at 8/11/2021    ED COURSE/MDM    58 y.o. female presenting to the ED for chest pain.  Patient is hemodynamically stable upon arrival to the emergency department.  Patient is afebrile.  Heart rate within normal limits.  Patient's O2 saturation within normal limits on room air.  Differential diagnosis includes but not limited to ACS, musculoskeletal pain, pneumothorax, pneumonia, costochondritis, aortic dissection, pulmonary embolism, pericarditis, acute esophageal injury/pathology.  EKG and CXR were obtained.  Cardiac biomarker's ordered along with cbc and electrolytes. Patient given aspirin and Nitropaste for pain. Cardiac and lung examination unremarkable.       Update:  I personally interpreted CXR  which showed No acute cardiopulmonary process,  making pneumonia or pneumothorax unlikely. It does not demonstrate any signs of mediastinal widening to indicate aortic dissection, in addition pulses +2 b/l. EKG was done and revealed no signs of acute ischemia or pericarditis.  EKG does have ST elevation in aVR which is consistent with prior EKG from 2 years ago.  EKG does have ST depression in lateral leads which is consistent with prior EKG from 2021.  Patient shown to have right bundle branch block as well on EKG.  Elevated troponin at 20.  Delta troponin elevated to 19.  CMP shows mild hyponatremia at 134.  BUN elevated 21.  Glucose elevated 152.  GFR 58.. Heart Score: 6.  PE was considered however based on the patient's history, their lack of tachycardia, hypoxia, and tachypnea.  Wells criteria for PE 0.  Patient states her chest pain improve after

## 2024-01-30 LAB
ANION GAP SERPL CALCULATED.3IONS-SCNC: 9 MMOL/L (ref 3–16)
ANTI-XA UNFRAC HEPARIN: 0.3 IU/ML (ref 0.3–0.7)
ANTI-XA UNFRAC HEPARIN: 0.37 IU/ML (ref 0.3–0.7)
BASOPHILS # BLD: 0.1 K/UL (ref 0–0.2)
BASOPHILS NFR BLD: 1.2 %
BUN SERPL-MCNC: 19 MG/DL (ref 7–20)
CALCIUM SERPL-MCNC: 8.4 MG/DL (ref 8.3–10.6)
CHLORIDE SERPL-SCNC: 102 MMOL/L (ref 99–110)
CO2 SERPL-SCNC: 26 MMOL/L (ref 21–32)
CREAT SERPL-MCNC: 1 MG/DL (ref 0.6–1.1)
DEPRECATED RDW RBC AUTO: 14.1 % (ref 12.4–15.4)
EKG ATRIAL RATE: 82 BPM
EKG DIAGNOSIS: NORMAL
EKG P AXIS: 72 DEGREES
EKG P-R INTERVAL: 188 MS
EKG Q-T INTERVAL: 416 MS
EKG QRS DURATION: 138 MS
EKG QTC CALCULATION (BAZETT): 486 MS
EKG R AXIS: -47 DEGREES
EKG T AXIS: 51 DEGREES
EKG VENTRICULAR RATE: 82 BPM
EOSINOPHIL # BLD: 0.4 K/UL (ref 0–0.6)
EOSINOPHIL NFR BLD: 3.3 %
GFR SERPLBLD CREATININE-BSD FMLA CKD-EPI: >60 ML/MIN/{1.73_M2}
GLUCOSE BLD-MCNC: 176 MG/DL (ref 70–99)
GLUCOSE BLD-MCNC: 182 MG/DL (ref 70–99)
GLUCOSE BLD-MCNC: 206 MG/DL (ref 70–99)
GLUCOSE BLD-MCNC: 206 MG/DL (ref 70–99)
GLUCOSE BLD-MCNC: 265 MG/DL (ref 70–99)
GLUCOSE SERPL-MCNC: 231 MG/DL (ref 70–99)
HCT VFR BLD AUTO: 38.2 % (ref 36–48)
HGB BLD-MCNC: 12.8 G/DL (ref 12–16)
LYMPHOCYTES # BLD: 4.2 K/UL (ref 1–5.1)
LYMPHOCYTES NFR BLD: 34.7 %
MCH RBC QN AUTO: 29.6 PG (ref 26–34)
MCHC RBC AUTO-ENTMCNC: 33.6 G/DL (ref 31–36)
MCV RBC AUTO: 88.1 FL (ref 80–100)
MONOCYTES # BLD: 0.9 K/UL (ref 0–1.3)
MONOCYTES NFR BLD: 7.3 %
NEUTROPHILS # BLD: 6.5 K/UL (ref 1.7–7.7)
NEUTROPHILS NFR BLD: 53.5 %
PERFORMED ON: ABNORMAL
PLATELET # BLD AUTO: 306 K/UL (ref 135–450)
PMV BLD AUTO: 8 FL (ref 5–10.5)
POTASSIUM SERPL-SCNC: 4 MMOL/L (ref 3.5–5.1)
RBC # BLD AUTO: 4.33 M/UL (ref 4–5.2)
SODIUM SERPL-SCNC: 137 MMOL/L (ref 136–145)
TROPONIN, HIGH SENSITIVITY: 21 NG/L (ref 0–14)
TROPONIN, HIGH SENSITIVITY: 22 NG/L (ref 0–14)
WBC # BLD AUTO: 12.2 K/UL (ref 4–11)

## 2024-01-30 PROCEDURE — 1200000000 HC SEMI PRIVATE

## 2024-01-30 PROCEDURE — 36415 COLL VENOUS BLD VENIPUNCTURE: CPT

## 2024-01-30 PROCEDURE — 84484 ASSAY OF TROPONIN QUANT: CPT

## 2024-01-30 PROCEDURE — 85025 COMPLETE CBC W/AUTO DIFF WBC: CPT

## 2024-01-30 PROCEDURE — 99223 1ST HOSP IP/OBS HIGH 75: CPT | Performed by: INTERNAL MEDICINE

## 2024-01-30 PROCEDURE — 6370000000 HC RX 637 (ALT 250 FOR IP): Performed by: ANESTHESIOLOGY

## 2024-01-30 PROCEDURE — 93010 ELECTROCARDIOGRAM REPORT: CPT | Performed by: INTERNAL MEDICINE

## 2024-01-30 PROCEDURE — 85520 HEPARIN ASSAY: CPT

## 2024-01-30 PROCEDURE — 6360000002 HC RX W HCPCS: Performed by: STUDENT IN AN ORGANIZED HEALTH CARE EDUCATION/TRAINING PROGRAM

## 2024-01-30 PROCEDURE — 2580000003 HC RX 258: Performed by: ANESTHESIOLOGY

## 2024-01-30 PROCEDURE — 80048 BASIC METABOLIC PNL TOTAL CA: CPT

## 2024-01-30 PROCEDURE — 6370000000 HC RX 637 (ALT 250 FOR IP): Performed by: STUDENT IN AN ORGANIZED HEALTH CARE EDUCATION/TRAINING PROGRAM

## 2024-01-30 RX ORDER — REGADENOSON 0.08 MG/ML
0.4 INJECTION, SOLUTION INTRAVENOUS
Status: COMPLETED | OUTPATIENT
Start: 2024-01-30 | End: 2024-01-31

## 2024-01-30 RX ADMIN — SODIUM CHLORIDE: 9 INJECTION, SOLUTION INTRAVENOUS at 00:06

## 2024-01-30 RX ADMIN — LISINOPRIL 5 MG: 5 TABLET ORAL at 11:19

## 2024-01-30 RX ADMIN — NITROGLYCERIN 0.5 INCH: 20 OINTMENT TOPICAL at 14:15

## 2024-01-30 RX ADMIN — ASPIRIN 81 MG: 81 TABLET, COATED ORAL at 11:18

## 2024-01-30 RX ADMIN — METOPROLOL TARTRATE 12.5 MG: 25 TABLET, FILM COATED ORAL at 20:50

## 2024-01-30 RX ADMIN — HEPARIN SODIUM 1000 UNITS/HR: 10000 INJECTION, SOLUTION INTRAVENOUS at 23:10

## 2024-01-30 RX ADMIN — NITROGLYCERIN 0.5 INCH: 20 OINTMENT TOPICAL at 18:46

## 2024-01-30 RX ADMIN — ROSUVASTATIN CALCIUM 40 MG: 40 TABLET, FILM COATED ORAL at 11:19

## 2024-01-30 RX ADMIN — NITROGLYCERIN 0.5 INCH: 20 OINTMENT TOPICAL at 06:05

## 2024-01-30 RX ADMIN — SODIUM CHLORIDE: 9 INJECTION, SOLUTION INTRAVENOUS at 16:57

## 2024-01-30 RX ADMIN — NITROGLYCERIN 0.5 INCH: 20 OINTMENT TOPICAL at 00:06

## 2024-01-30 RX ADMIN — VILAZODONE HYDROCHLORIDE 40 MG: 40 TABLET, FILM COATED ORAL at 12:00

## 2024-01-30 RX ADMIN — METOPROLOL TARTRATE 12.5 MG: 25 TABLET, FILM COATED ORAL at 11:18

## 2024-01-30 RX ADMIN — Medication 10 ML: at 11:22

## 2024-01-30 RX ADMIN — LIOTHYRONINE SODIUM 25 MCG: 25 TABLET ORAL at 12:00

## 2024-01-30 ASSESSMENT — PAIN SCALES - GENERAL: PAINLEVEL_OUTOF10: 0

## 2024-01-30 NOTE — ED NOTES
Patient resting comfortably, respirations easy, unlabored. Patient in no acute distress. Denies needs at this time. Call light within reach, bed in lowest position, side rails up x 2.

## 2024-01-30 NOTE — H&P
V2.0  History and Physical      Name:  Albino Uriostegui /Age/Sex: 1965  (58 y.o. female)   MRN & CSN:  [id number] & [id number] Encounter Date/Time: 2024 10:28 PM EST   [facility] PCP: Kelton Fang       Assessment and Plan:   Chest pain: patient had history of CAD s/p CABG and stents, heparin drip started in the ED, will continue, trend troponin, cardiology consulted, appreciate recs. aspirin, statin, beta-blocker    Diabetes mellitus type 2: ISS, frequent blood sugar checks    Hypertension: Lisinopril, beta-blocker, hydralazine/labetalol IV as needed    Hypothyroidism: Continue with home med    Admit: Inpatient  Code: Full  DVT proph: SCD, heparin      History of Present Illness:     Chief Complaint: chest pain  Albino Uriostegui is a 58 y.o. female who is presenting with chest pain that started today at this morning.  Patient describes the pain as squeezing and radiates to the left arm.  She has history of CABG and stents placement.  She still smokes half pack per day.  When I saw the patient she was comfortable in bed, awake alert oriented x 3 on room air.  Chest pain was resolved. In the ED patient was afebrile, .  Labs showed troponin 20>>19, white count 12.1.  EKG without acute ST changes.  Chest x-ray negative for acute findings.  Patient was started on a heparin drip and aspirin.     Review of Systems:        Pertinent positives and negatives discussed in HPI     Objective:   No intake or output data in the 24 hours ending 24 2228   Vitals:   Vitals:    24 1720 24 2146 24 2200   BP: (!) 170/90 (!) 172/98 (!) 171/90   Pulse: 78 69 71   Resp: 16  15   Temp: 98 °F (36.7 °C)     TempSrc: Oral     SpO2: 98%  99%   Weight: 87.5 kg (192 lb 14.4 oz)     Height: 1.727 m (5' 8\")         Medications Prior to Admission     Prior to Admission medications    Medication Sig Start Date End Date Taking? Authorizing Provider   liothyronine (CYTOMEL) 25 MCG tablet Take 1  results for input(s): \"PROBNP\" in the last 72 hours.  UA:  Lab Results   Component Value Date/Time    NITRU Negative 11/06/2017 04:47 PM    COLORU DK YELLOW 11/06/2017 04:47 PM    PHUR 6.0 11/06/2017 04:47 PM    WBCUA 15 11/06/2017 04:47 PM    RBCUA 2 11/06/2017 04:47 PM    BACTERIA 1+ 11/06/2017 04:47 PM    CLARITYU CLOUDY 11/06/2017 04:47 PM    SPECGRAV 1.025 11/06/2017 04:47 PM    LEUKOCYTESUR SMALL 11/06/2017 04:47 PM    UROBILINOGEN 1.0 11/06/2017 04:47 PM    BILIRUBINUR SMALL 11/06/2017 04:47 PM    BLOODU Negative 11/06/2017 04:47 PM    GLUCOSEU Negative 11/06/2017 04:47 PM    KETUA TRACE 11/06/2017 04:47 PM     Urine Cultures:   Lab Results   Component Value Date/Time    LABURIN No growth at 18-36 hours 11/06/2017 04:47 PM     Blood Cultures: No results found for: \"BC\"  No results found for: \"BLOODCULT2\"  Organism: No results found for: \"ORG\"    Imaging/Diagnostics Last 24 Hours   XR CHEST (2 VW)    Result Date: 1/29/2024  EXAMINATION: TWO XRAY VIEWS OF THE CHEST 1/29/2024 6:04 pm COMPARISON: None. HISTORY: ORDERING SYSTEM PROVIDED HISTORY: cp TECHNOLOGIST PROVIDED HISTORY: Reason for exam:->cp Reason for Exam: hx heart surgery 6-7 yrs ago FINDINGS: Medical devices: Sternotomy wires in the midline.  Calcifications or stents over the heart.  Surgical clips over the heart. Mediastinum/Heart: The mediastinal contours are normal. The heart appears normal in size. Lungs: Streaky opacities in the retrocardiac left lung, favored to represent atelectasis and/or scarring.  The lungs are otherwise clear. Pleura: No pleural effusion. No pneumothorax.     No radiographic evidence of acute cardiopulmonary pathology.         Electronically signed by Chelsea Caceres DO on 1/29/2024 at 10:28 PM

## 2024-01-30 NOTE — PROGRESS NOTES
Patient to have a stress test tomorrow. Please assure the patient has no caffeine, decaffeinated beverages, or chocolate after 8 pm the evening before the test. Please hold all beta blockers the evening and morning of the test as well. Nothing to eat 2 hours prior to arrival to the stress lab. The patient may have water as desired or ordered. The patient must have at least 2 Troponin resulted prior to arriving for the stress test. If the test is ordered for exercise please have the patient arrive with shoes suitable for walking on a treadmill.

## 2024-01-30 NOTE — PROGRESS NOTES
Clinical Pharmacy Note  Heparin Dosing       Lab Results   Component Value Date/Time    ANTIXAUHEP 0.37 01/30/2024 04:17 AM      Lab Results   Component Value Date/Time    HGB 12.8 01/30/2024 04:17 AM    HCT 38.2 01/30/2024 04:17 AM     01/30/2024 04:17 AM    INR 1.19 11/08/2017 05:00 AM       Current Infusion Rate: 1000 units/hr    Plan:  Rate: continue at 1000 units/hr  Next anti-Xa level: 1200 1/30/24    Pharmacy will continue to monitor and adjust based on anti-Xa results.  Kirit Pastor, PharmD

## 2024-01-30 NOTE — CONSULTS
Referring Physician: * No referring provider recorded for this case *  Reason for Consultation: Acute chest pain with troponin elevation  Chief Complaint:       Subjective:   History of Present Illness:  Albino Uriostegui is a 58 y.o. patient with prior history of coronary artery disease s/p CABG, s/p stent, diabetes, hypertension, hyperlipidemia, hypothyroidism who presented to the hospital with complaints of left arm pain and squeezing sensation across her chest which happened on 1/29/2020 4 in the morning.  She did not break out into any cold sweats so she denies any shortness of breath.  She did complain of heart palpitation.  Pain would wax and wane.  She presented to the emergency room and her workup showed mildly elevated troponin leading to her admission and the consultation.  She has not been experiencing any exertional chest tightness, shortness of breath orthopnea PND    I have been asked to provide consultation regarding further management and testing.    Review of Systems:   All 12 point review of symptoms completed. Pertinent positives identified in the HPI, all other review of symptoms negative as below.    Past Medical History:   has a past medical history of CAD (coronary artery disease), Chest pain, Depression, Diabetes mellitus (HCC), Family history of early CAD, Female pelvic pain, HTN (hypertension), Hyperlipidemia, Hypothyroidism, Rectal bleeding, and Tobacco user.    Surgical History:   has a past surgical history that includes Cholecystectomy; Hysterectomy; Coronary angioplasty with stent; Coronary artery bypass graft (10/30/2017); and Coronary artery bypass graft.     Social History:   reports that she quit smoking about 6 years ago. Her smoking use included cigarettes. She started smoking about 26 years ago. She has a 5.0 pack-year smoking history. She has never used smokeless tobacco. She reports that she does not currently use alcohol. She reports that she does not use drugs.     Family

## 2024-01-30 NOTE — PROGRESS NOTES
Pharmacy Medication Reconciliation Note     List of medications Albino Uriostegui is currently taking is complete.    Source of information:   1. Conversation with patient at bedside  2. EMR    Notes regarding home medications:   1. Patient reports taking all morning medications today PTA in the ED  2. Takes both levothyroxine and liothyronine for hypothyroidism  3. Patient verbalized and confirmed basal-bolus insulin regimen  4. Injects Trulicity once weekly on Wednesdays  5. Has not started taking vitamin D 50,000 units yet     Patient denies taking any OTC or herbal medications other than those listed    Rayo Hernadez, Pharmacy Intern  1/29/2024  10:20 PM

## 2024-01-30 NOTE — PLAN OF CARE
Problem: Discharge Planning  Goal: Discharge to home or other facility with appropriate resources  1/30/2024 1138 by Yemi Benavidez RN  Outcome: Progressing  1/30/2024 0048 by Pauline Mendoza RN  Outcome: Progressing  1/30/2024 0046 by Pauline Mendoza RN  Outcome: Progressing  Flowsheets (Taken 1/29/2024 2341)  Discharge to home or other facility with appropriate resources:   Identify barriers to discharge with patient and caregiver   Arrange for needed discharge resources and transportation as appropriate     Problem: Pain  Goal: Verbalizes/displays adequate comfort level or baseline comfort level  1/30/2024 1138 by Yemi Benavidez RN  Outcome: Progressing  1/30/2024 0048 by Pauline Mendoza RN  Outcome: Progressing  1/30/2024 0046 by Pauline Mendoza RN  Outcome: Progressing     Problem: Safety - Adult  Goal: Free from fall injury  1/30/2024 1138 by Yemi Benavidez RN  Outcome: Progressing  Flowsheets (Taken 1/30/2024 1137)  Free From Fall Injury: Instruct family/caregiver on patient safety  1/30/2024 0048 by Pauline Mendoza RN  Outcome: Progressing  1/30/2024 0046 by Pauline Mendoza RN  Outcome: Progressing     Problem: ABCDS Injury Assessment  Goal: Absence of physical injury  1/30/2024 1138 by Yemi Benavidez RN  Outcome: Progressing  Flowsheets (Taken 1/30/2024 1137)  Absence of Physical Injury: Implement safety measures based on patient assessment  1/30/2024 0048 by Pauline Mendoza RN  Outcome: Progressing  1/30/2024 0046 by Pauline Mendoza RN  Outcome: Progressing   Electronically signed by YEMI BENAVIDEZ RN on 1/30/2024 at 11:38 AM

## 2024-01-30 NOTE — PROGRESS NOTES
Clinical Pharmacy Note  Heparin Dosing       Lab Results   Component Value Date/Time    ANTIXAUHEP 0.30 01/30/2024 11:18 AM      Lab Results   Component Value Date/Time    HGB 12.8 01/30/2024 04:17 AM    HCT 38.2 01/30/2024 04:17 AM     01/30/2024 04:17 AM    INR 1.19 11/08/2017 05:00 AM       Current Infusion Rate: 1000 units/hr    Plan:  Rate: continue at 1000 units/hr  Next anti-Xa level: 0600 1/31/24    Pharmacy will continue to monitor and adjust based on anti-Xa results.  Christopher Pop RPH, 1/30/2024 12:08 PM

## 2024-01-30 NOTE — PROGRESS NOTES
Patient admitted to room 3131 from ER via stretcher.  Oriented to room, call light, and floor policies.  Plan of care reviewed with patient.  Pt is resting in bed and not c/o pain at this time; no s/s of distress noted. Assessment completed; VSS. Tele in place reading sinus rhythm. Safety precautions in place; call light and bedside table within reach.  Pt encouraged to call for needs or ambulation.  Pt VU.  Will continue to monitor.

## 2024-01-30 NOTE — CARE COORDINATION
NM stress test will be done Wednesday 1/31 due to no doses   Prep:   NPO 8pm 1/30, patient is allowed to have water.   No caffeine or decaffeinated 12 hours prior to exam     Exam:   This is a 2.5-3 hour exam   Patient must be able to put both arms above head

## 2024-01-30 NOTE — PROGRESS NOTES
4 Eyes Skin Assessment     NAME:  Albino Uriostegui  YOB: 1965  MEDICAL RECORD NUMBER:  [id number]    The patient is being assessed for  Admission    I agree that at least one RN has performed a thorough Head to Toe Skin Assessment on the patient. ALL assessment sites listed below have been assessed.      Areas assessed by both nurses:    Head, Face, Ears, Shoulders, Back, Chest, Arms, Elbows, Hands, Sacrum. Buttock, Coccyx, Ischium, Legs. Feet and Heels, and Under Medical Devices         Does the Patient have a Wound? No noted wound(s)       Tobias Prevention initiated by RN: No  Wound Care Orders initiated by RN: No    Pressure Injury (Stage 3,4, Unstageable, DTI, NWPT, and Complex wounds) if present, place Wound referral order by RN under : No    New Ostomies, if present place, Ostomy referral order under : No     Nurse 1 eSignature: Electronically signed by Pauline Mendoza RN on 1/30/24 at 12:27 AM EST    **SHARE this note so that the co-signing nurse can place an eSignature**    Nurse 2 eSignature: {Esignature:[id number]}

## 2024-01-30 NOTE — PROGRESS NOTES
V2.0    [facility] Progress Note      Name:  Albino Uriostegui /Age/Sex: 1965  (58 y.o. female)   MRN & CSN:  [id number] & [id number] Encounter Date/Time: 2024 12:02 PM EST   Location:  [id number]-6380/3131-01 PCP: Kelton Fang     Attending:Yue Love MD       Hospital Day: 2    Assessment and Recommendations   Albino Uriostegui is a 58 y.o. female with pmh of  who presents with Chest pain    Chest pain: patient had history of CAD s/p CABG and stents, heparin drip started in the ED, will continue, trend troponin, cardiology consulted, appreciate recs. aspirin, statin, beta-blocker    Discussed with cardiology , plan for NM stress test      Diabetes mellitus type 2: ISS, frequent blood sugar checks     Hypertension: Lisinopril, beta-blocker, hydralazine/labetalol IV as needed     Hypothyroidism: Continue with home med     Admit: Inpatient  Code: Full  DVT proph: SCD, heparin        Diet Diet NPO Exceptions are: Sips of Water with Meds  ADULT DIET; Regular; 4 carb choices (60 gm/meal); Low Sodium (2 gm)   DVT Prophylaxis [] Lovenox, []  Heparin, [] SCDs, [] Ambulation,  [] Eliquis, [] Xarelto  [] Coumadin   Code Status Full Code   Disposition From:   Expected Disposition:   Estimated Date of Discharge:   Patient requires continued admission due to    Surrogate Decision Maker/ POA       Personally reviewed Lab Studies and Imaging   Telemetry reviewed by myself no ST elevation     Medical Decision Making:  The following items were considered in medical decision making:  Discussion of patient care with other providers  Reviewed clinical lab tests  Reviewed radiology tests  Reviewed other diagnostic tests/interventions  Independent review of radiologic images  Microbiology cultures and other micro tests reviewed          Subjective:     Chief Complaint:     Albino Uriostegui is a 58 y.o. female who presents with     On heparin drip  Denies chest pain or sob at present  Denies vomiting or abdominal  11/06/2017 04:47 PM    CLARITYU CLOUDY 11/06/2017 04:47 PM    SPECGRAV 1.025 11/06/2017 04:47 PM    LEUKOCYTESUR SMALL 11/06/2017 04:47 PM    UROBILINOGEN 1.0 11/06/2017 04:47 PM    BILIRUBINUR SMALL 11/06/2017 04:47 PM    BLOODU Negative 11/06/2017 04:47 PM    GLUCOSEU Negative 11/06/2017 04:47 PM    KETUA TRACE 11/06/2017 04:47 PM     Urine Cultures:   Lab Results   Component Value Date/Time    LABURIN No growth at 18-36 hours 11/06/2017 04:47 PM     Blood Cultures: No results found for: \"BC\"  No results found for: \"BLOODCULT2\"  Organism: No results found for: \"ORG\"      Electronically signed by Yue Love MD on 1/30/2024 at 12:02 PM

## 2024-01-30 NOTE — ED NOTES
ED handoff report provided to 3W RN. Patient to be transported to Room 3131 via stretcher. IV site clean, dry, and intact. Vitals stable. Patient updated on plan of care. All questions answered.

## 2024-01-30 NOTE — CONSULTS
Clinical Pharmacy Note  Heparin Dosing Consult    Albino Uriostegui is a 58 y.o. female ordered heparin per CAD/STEMI/NSTEMI/UA/AFIB nomogram by Dr. Cohen.     Lab Results   Component Value Date/Time    ANTIXAUHEP <0.10 01/29/2024 09:38 PM      Lab Results   Component Value Date/Time    HGB 14.2 01/29/2024 05:43 PM    HCT 43.1 01/29/2024 05:43 PM     01/29/2024 05:43 PM    INR 1.19 11/08/2017 05:00 AM       Ht Readings from Last 1 Encounters:   01/29/24 1.727 m (5' 8\")        Wt Readings from Last 1 Encounters:   01/30/24 87.5 kg (192 lb 14.4 oz)        Assessment/Plan:  Initial bolus: 4000 units  Initial infusion rate: 1000 units/hr  Next anti-Xa: 0400 1/30/24    Pharmacy will continue to monitor adjust heparin based on anti-Xa results using nomogram below:     CAD/STEMI/NSTEMI/UA/AFIB Heparin Nomogram     Initial Bolus: 60 units/kg Max Bolus: 4,000 units       Initial Rate: 12 units/kg/hr Max Initial Rate: 1,000 units/hr     anti-Xa Bolus Titration   < 0.1 Heparin 60 units/kg bolus Increase drip by 4 units/kg/hr   0.1 - 0.29 Heparin 30 units/kg bolus Increase drip by 2 units/kg/hr   0.3 - 0.7 No Bolus No Change   0.71 - 0.8 No Bolus Decrease drip by 1 units/kg/hr   0.81 - 0.99 No Bolus Decrease drip by 2 units/kg/hr   > 1 Hold Heparin for 1 hour Decrease drip by 3 units/kg/hr     Obtain anti-Xa 6 hours after initial bolus and 6 hours after any dose change until two consecutive therapeutic anti-Xa levels are achieved - then daily.    Kirit Pastor, BassamD

## 2024-01-30 NOTE — PLAN OF CARE
Problem: Discharge Planning  Goal: Discharge to home or other facility with appropriate resources  1/30/2024 0048 by Pauline Mendoza RN  Outcome: Progressing     Problem: Pain  Goal: Verbalizes/displays adequate comfort level or baseline comfort level  1/30/2024 0048 by Pauline Mendoza RN  Outcome: Progressing     Problem: Safety - Adult  Goal: Free from fall injury  1/30/2024 0048 by Pauline Mendoza RN  Outcome: Progressing     Problem: ABCDS Injury Assessment  Goal: Absence of physical injury  1/30/2024 0048 by Pauline Mendoza RN  Outcome: Progressing

## 2024-01-31 ENCOUNTER — APPOINTMENT (OUTPATIENT)
Dept: CARDIAC CATH/INVASIVE PROCEDURES | Age: 59
End: 2024-01-31
Payer: COMMERCIAL

## 2024-01-31 LAB
ANION GAP SERPL CALCULATED.3IONS-SCNC: 8 MMOL/L (ref 3–16)
ANTI-XA UNFRAC HEPARIN: 0.19 IU/ML (ref 0.3–0.7)
ANTI-XA UNFRAC HEPARIN: 0.37 IU/ML (ref 0.3–0.7)
ANTI-XA UNFRAC HEPARIN: <0.1 IU/ML (ref 0.3–0.7)
BUN SERPL-MCNC: 17 MG/DL (ref 7–20)
CALCIUM SERPL-MCNC: 9.1 MG/DL (ref 8.3–10.6)
CHLORIDE SERPL-SCNC: 104 MMOL/L (ref 99–110)
CO2 SERPL-SCNC: 26 MMOL/L (ref 21–32)
CREAT SERPL-MCNC: 1 MG/DL (ref 0.6–1.1)
DEPRECATED RDW RBC AUTO: 13.9 % (ref 12.4–15.4)
DEPRECATED RDW RBC AUTO: 14.2 % (ref 12.4–15.4)
GFR SERPLBLD CREATININE-BSD FMLA CKD-EPI: >60 ML/MIN/{1.73_M2}
GLUCOSE BLD-MCNC: 220 MG/DL (ref 70–99)
GLUCOSE BLD-MCNC: 227 MG/DL (ref 70–99)
GLUCOSE BLD-MCNC: 273 MG/DL (ref 70–99)
GLUCOSE BLD-MCNC: 339 MG/DL (ref 70–99)
GLUCOSE SERPL-MCNC: 216 MG/DL (ref 70–99)
HCT VFR BLD AUTO: 37.3 % (ref 36–48)
HCT VFR BLD AUTO: 39 % (ref 36–48)
HGB BLD-MCNC: 12.5 G/DL (ref 12–16)
HGB BLD-MCNC: 13.1 G/DL (ref 12–16)
MCH RBC QN AUTO: 29.6 PG (ref 26–34)
MCH RBC QN AUTO: 29.6 PG (ref 26–34)
MCHC RBC AUTO-ENTMCNC: 33.4 G/DL (ref 31–36)
MCHC RBC AUTO-ENTMCNC: 33.6 G/DL (ref 31–36)
MCV RBC AUTO: 88 FL (ref 80–100)
MCV RBC AUTO: 88.5 FL (ref 80–100)
PERFORMED ON: ABNORMAL
PLATELET # BLD AUTO: 315 K/UL (ref 135–450)
PLATELET # BLD AUTO: 327 K/UL (ref 135–450)
PMV BLD AUTO: 7.9 FL (ref 5–10.5)
PMV BLD AUTO: 8.2 FL (ref 5–10.5)
POTASSIUM SERPL-SCNC: 4.2 MMOL/L (ref 3.5–5.1)
RBC # BLD AUTO: 4.22 M/UL (ref 4–5.2)
RBC # BLD AUTO: 4.44 M/UL (ref 4–5.2)
SODIUM SERPL-SCNC: 138 MMOL/L (ref 136–145)
WBC # BLD AUTO: 10.9 K/UL (ref 4–11)
WBC # BLD AUTO: 11 K/UL (ref 4–11)

## 2024-01-31 PROCEDURE — 93017 CV STRESS TEST TRACING ONLY: CPT

## 2024-01-31 PROCEDURE — 6360000004 HC RX CONTRAST MEDICATION: Performed by: INTERNAL MEDICINE

## 2024-01-31 PROCEDURE — 3430000000 HC RX DIAGNOSTIC RADIOPHARMACEUTICAL: Performed by: INTERNAL MEDICINE

## 2024-01-31 PROCEDURE — 6370000000 HC RX 637 (ALT 250 FOR IP): Performed by: ANESTHESIOLOGY

## 2024-01-31 PROCEDURE — B2151ZZ FLUOROSCOPY OF LEFT HEART USING LOW OSMOLAR CONTRAST: ICD-10-PCS | Performed by: INTERNAL MEDICINE

## 2024-01-31 PROCEDURE — 2580000003 HC RX 258

## 2024-01-31 PROCEDURE — 2709999900 HC NON-CHARGEABLE SUPPLY: Performed by: INTERNAL MEDICINE

## 2024-01-31 PROCEDURE — B2111ZZ FLUOROSCOPY OF MULTIPLE CORONARY ARTERIES USING LOW OSMOLAR CONTRAST: ICD-10-PCS | Performed by: INTERNAL MEDICINE

## 2024-01-31 PROCEDURE — 93461 R&L HRT ART/VENTRICLE ANGIO: CPT | Performed by: INTERNAL MEDICINE

## 2024-01-31 PROCEDURE — 1200000000 HC SEMI PRIVATE

## 2024-01-31 PROCEDURE — 6360000002 HC RX W HCPCS: Performed by: HOSPITALIST

## 2024-01-31 PROCEDURE — 36415 COLL VENOUS BLD VENIPUNCTURE: CPT

## 2024-01-31 PROCEDURE — 99152 MOD SED SAME PHYS/QHP 5/>YRS: CPT

## 2024-01-31 PROCEDURE — 85520 HEPARIN ASSAY: CPT

## 2024-01-31 PROCEDURE — 6360000002 HC RX W HCPCS

## 2024-01-31 PROCEDURE — 93459 L HRT ART/GRFT ANGIO: CPT

## 2024-01-31 PROCEDURE — 99153 MOD SED SAME PHYS/QHP EA: CPT

## 2024-01-31 PROCEDURE — 6370000000 HC RX 637 (ALT 250 FOR IP): Performed by: STUDENT IN AN ORGANIZED HEALTH CARE EDUCATION/TRAINING PROGRAM

## 2024-01-31 PROCEDURE — 2580000003 HC RX 258: Performed by: INTERNAL MEDICINE

## 2024-01-31 PROCEDURE — C1894 INTRO/SHEATH, NON-LASER: HCPCS | Performed by: INTERNAL MEDICINE

## 2024-01-31 PROCEDURE — 85027 COMPLETE CBC AUTOMATED: CPT

## 2024-01-31 PROCEDURE — 80048 BASIC METABOLIC PNL TOTAL CA: CPT

## 2024-01-31 PROCEDURE — 6360000002 HC RX W HCPCS: Performed by: INTERNAL MEDICINE

## 2024-01-31 PROCEDURE — C1760 CLOSURE DEV, VASC: HCPCS | Performed by: INTERNAL MEDICINE

## 2024-01-31 PROCEDURE — C1769 GUIDE WIRE: HCPCS | Performed by: INTERNAL MEDICINE

## 2024-01-31 PROCEDURE — 4A023N7 MEASUREMENT OF CARDIAC SAMPLING AND PRESSURE, LEFT HEART, PERCUTANEOUS APPROACH: ICD-10-PCS | Performed by: INTERNAL MEDICINE

## 2024-01-31 PROCEDURE — 6360000002 HC RX W HCPCS: Performed by: STUDENT IN AN ORGANIZED HEALTH CARE EDUCATION/TRAINING PROGRAM

## 2024-01-31 PROCEDURE — A9502 TC99M TETROFOSMIN: HCPCS | Performed by: INTERNAL MEDICINE

## 2024-01-31 PROCEDURE — 78452 HT MUSCLE IMAGE SPECT MULT: CPT

## 2024-01-31 PROCEDURE — 2500000003 HC RX 250 WO HCPCS

## 2024-01-31 RX ORDER — ACETAMINOPHEN 325 MG/1
650 TABLET ORAL EVERY 4 HOURS PRN
Status: DISCONTINUED | OUTPATIENT
Start: 2024-01-31 | End: 2024-01-31

## 2024-01-31 RX ORDER — SODIUM CHLORIDE 9 MG/ML
INJECTION, SOLUTION INTRAVENOUS PRN
Status: DISCONTINUED | OUTPATIENT
Start: 2024-01-31 | End: 2024-02-01 | Stop reason: SDUPTHER

## 2024-01-31 RX ORDER — SODIUM CHLORIDE 0.9 % (FLUSH) 0.9 %
5-40 SYRINGE (ML) INJECTION PRN
Status: DISCONTINUED | OUTPATIENT
Start: 2024-01-31 | End: 2024-02-01 | Stop reason: SDUPTHER

## 2024-01-31 RX ORDER — ONDANSETRON 2 MG/ML
4 INJECTION INTRAMUSCULAR; INTRAVENOUS EVERY 6 HOURS PRN
Status: DISCONTINUED | OUTPATIENT
Start: 2024-01-31 | End: 2024-01-31 | Stop reason: SDUPTHER

## 2024-01-31 RX ORDER — SODIUM CHLORIDE 9 MG/ML
INJECTION, SOLUTION INTRAVENOUS PRN
Status: DISCONTINUED | OUTPATIENT
Start: 2024-01-31 | End: 2024-02-02 | Stop reason: HOSPADM

## 2024-01-31 RX ORDER — SODIUM CHLORIDE 0.9 % (FLUSH) 0.9 %
5-40 SYRINGE (ML) INJECTION PRN
Status: DISCONTINUED | OUTPATIENT
Start: 2024-01-31 | End: 2024-02-02 | Stop reason: HOSPADM

## 2024-01-31 RX ORDER — DIPHENHYDRAMINE HYDROCHLORIDE 50 MG/ML
25 INJECTION INTRAMUSCULAR; INTRAVENOUS EVERY 6 HOURS PRN
Status: DISCONTINUED | OUTPATIENT
Start: 2024-01-31 | End: 2024-02-02 | Stop reason: HOSPADM

## 2024-01-31 RX ORDER — SODIUM CHLORIDE 0.9 % (FLUSH) 0.9 %
5-40 SYRINGE (ML) INJECTION EVERY 12 HOURS SCHEDULED
Status: DISCONTINUED | OUTPATIENT
Start: 2024-01-31 | End: 2024-02-02 | Stop reason: HOSPADM

## 2024-01-31 RX ORDER — METHYLPREDNISOLONE SODIUM SUCCINATE 125 MG/2ML
125 INJECTION, POWDER, LYOPHILIZED, FOR SOLUTION INTRAMUSCULAR; INTRAVENOUS ONCE
Status: COMPLETED | OUTPATIENT
Start: 2024-01-31 | End: 2024-01-31

## 2024-01-31 RX ORDER — HEPARIN SODIUM 1000 [USP'U]/ML
2000 INJECTION, SOLUTION INTRAVENOUS; SUBCUTANEOUS ONCE
Status: COMPLETED | OUTPATIENT
Start: 2024-01-31 | End: 2024-01-31

## 2024-01-31 RX ORDER — SODIUM CHLORIDE 0.9 % (FLUSH) 0.9 %
5-40 SYRINGE (ML) INJECTION EVERY 12 HOURS SCHEDULED
Status: DISCONTINUED | OUTPATIENT
Start: 2024-01-31 | End: 2024-02-01 | Stop reason: SDUPTHER

## 2024-01-31 RX ADMIN — HEPARIN SODIUM 2000 UNITS: 1000 INJECTION INTRAVENOUS; SUBCUTANEOUS at 06:40

## 2024-01-31 RX ADMIN — METHYLPREDNISOLONE SODIUM SUCCINATE 125 MG: 125 INJECTION INTRAMUSCULAR; INTRAVENOUS at 15:44

## 2024-01-31 RX ADMIN — NITROGLYCERIN 0.5 INCH: 20 OINTMENT TOPICAL at 22:10

## 2024-01-31 RX ADMIN — IOPAMIDOL 150 ML: 755 INJECTION, SOLUTION INTRAVENOUS at 17:31

## 2024-01-31 RX ADMIN — METOPROLOL TARTRATE 12.5 MG: 25 TABLET, FILM COATED ORAL at 10:30

## 2024-01-31 RX ADMIN — LISINOPRIL 5 MG: 5 TABLET ORAL at 10:30

## 2024-01-31 RX ADMIN — ROSUVASTATIN CALCIUM 40 MG: 40 TABLET, FILM COATED ORAL at 10:30

## 2024-01-31 RX ADMIN — TETROFOSMIN 10 MILLICURIE: 1.38 INJECTION, POWDER, LYOPHILIZED, FOR SOLUTION INTRAVENOUS at 07:13

## 2024-01-31 RX ADMIN — HEPARIN SODIUM 1180 UNITS/HR: 10000 INJECTION, SOLUTION INTRAVENOUS at 06:44

## 2024-01-31 RX ADMIN — LIOTHYRONINE SODIUM 25 MCG: 25 TABLET ORAL at 10:31

## 2024-01-31 RX ADMIN — SODIUM CHLORIDE, PRESERVATIVE FREE 10 ML: 5 INJECTION INTRAVENOUS at 22:15

## 2024-01-31 RX ADMIN — TETROFOSMIN 30 MILLICURIE: 1.38 INJECTION, POWDER, LYOPHILIZED, FOR SOLUTION INTRAVENOUS at 08:12

## 2024-01-31 RX ADMIN — ASPIRIN 81 MG: 81 TABLET, COATED ORAL at 10:30

## 2024-01-31 RX ADMIN — INSULIN LISPRO 2 UNITS: 100 INJECTION, SOLUTION INTRAVENOUS; SUBCUTANEOUS at 18:30

## 2024-01-31 RX ADMIN — REGADENOSON 0.4 MG: 0.08 INJECTION, SOLUTION INTRAVENOUS at 08:08

## 2024-01-31 RX ADMIN — VILAZODONE HYDROCHLORIDE 40 MG: 40 TABLET, FILM COATED ORAL at 10:32

## 2024-01-31 RX ADMIN — DIPHENHYDRAMINE HYDROCHLORIDE 25 MG: 50 INJECTION INTRAMUSCULAR; INTRAVENOUS at 15:44

## 2024-01-31 RX ADMIN — INSULIN LISPRO 4 UNITS: 100 INJECTION, SOLUTION INTRAVENOUS; SUBCUTANEOUS at 22:12

## 2024-01-31 RX ADMIN — NITROGLYCERIN 0.5 INCH: 20 OINTMENT TOPICAL at 10:31

## 2024-01-31 RX ADMIN — METOPROLOL TARTRATE 12.5 MG: 25 TABLET, FILM COATED ORAL at 22:11

## 2024-01-31 ASSESSMENT — PAIN SCALES - GENERAL: PAINLEVEL_OUTOF10: 0

## 2024-01-31 NOTE — PLAN OF CARE
Problem: Discharge Planning  Goal: Discharge to home or other facility with appropriate resources  1/30/2024 1952 by Radha Omer RN  Outcome: Progressing  Flowsheets (Taken 1/30/2024 1138 by Yemi Fung RN)  Discharge to home or other facility with appropriate resources:   Identify barriers to discharge with patient and caregiver   Arrange for needed discharge resources and transportation as appropriate  1/30/2024 1138 by Yemi Fung RN  Outcome: Progressing  Flowsheets (Taken 1/30/2024 1138)  Discharge to home or other facility with appropriate resources:   Identify barriers to discharge with patient and caregiver   Arrange for needed discharge resources and transportation as appropriate     Problem: Pain  Goal: Verbalizes/displays adequate comfort level or baseline comfort level  1/30/2024 1952 by Radha Omer RN  Outcome: Progressing  Flowsheets (Taken 1/30/2024 1952)  Verbalizes/displays adequate comfort level or baseline comfort level: Encourage patient to monitor pain and request assistance  1/30/2024 1138 by Yemi Fung RN  Outcome: Progressing     Problem: Safety - Adult  Goal: Free from fall injury  1/30/2024 1952 by Radha Omer RN  Outcome: Progressing  Flowsheets (Taken 1/30/2024 1137 by Yemi Fung RN)  Free From Fall Injury: Instruct family/caregiver on patient safety  1/30/2024 1138 by Yemi Fung RN  Outcome: Progressing  Flowsheets (Taken 1/30/2024 1137)  Free From Fall Injury: Instruct family/caregiver on patient safety     Problem: ABCDS Injury Assessment  Goal: Absence of physical injury  1/30/2024 1952 by Radha Omer RN  Outcome: Progressing  Flowsheets (Taken 1/30/2024 1137 by Yemi Fung RN)  Absence of Physical Injury: Implement safety measures based on patient assessment  1/30/2024 1138 by Yemi Fung RN  Outcome: Progressing  Flowsheets (Taken 1/30/2024 1137)  Absence of Physical Injury: Implement safety measures based on patient assessment

## 2024-01-31 NOTE — PROGRESS NOTES
Patient resting in bed, alert and oriented x4. VSS. Patient denies any pain at this time. Patient voices no additional needs or concerns at this time. Bedside table, call light, and telephone all within reach.     Electronically signed by Radha Omer RN on 1/30/2024 at 9:02 PM

## 2024-01-31 NOTE — PROGRESS NOTES
[facility]  Cardiology Consult    Albino Uriostegui  1965 January 31, 2024      Reason for Referral: CP    CC: CP      Subjective:     History of Present Illness:    Albino Uriostegui is a 58 y.o. patient with a PMH significant for CAD presented with complaints of CP.         Past Medical History:   has a past medical history of CAD (coronary artery disease), Chest pain, Depression, Diabetes mellitus (HCC), Family history of early CAD, Female pelvic pain, HTN (hypertension), Hyperlipidemia, Hypothyroidism, Rectal bleeding, and Tobacco user.    Surgical History:   has a past surgical history that includes Cholecystectomy; Hysterectomy; Coronary angioplasty with stent; Coronary artery bypass graft (10/30/2017); and Coronary artery bypass graft.     Social History:   reports that she quit smoking about 6 years ago. Her smoking use included cigarettes. She started smoking about 26 years ago. She has a 5.0 pack-year smoking history. She has never used smokeless tobacco. She reports that she does not currently use alcohol. She reports that she does not use drugs.     Family History:  family history includes Heart Disease in her brother, brother, and father; Heart Disease (age of onset: 60) in her mother.    Home Medications:  Were reviewed and are listed in nursing record and/or below  Prior to Admission medications    Medication Sig Start Date End Date Taking? Authorizing Provider   liothyronine (CYTOMEL) 25 MCG tablet Take 1 tablet by mouth daily   Yes Anthony Lynne MD   pioglitazone (ACTOS) 30 MG tablet Take 1 tablet by mouth daily   Yes Anthony Lynne MD   vitamin D (ERGOCALCIFEROL) 1.25 MG (06726 UT) CAPS capsule Take 1 capsule by mouth Twice a Week   Yes Anthony Lynne MD   TRULICITY 1.5 MG/0.5ML SOPN INJECT 1 SYRINGE SUBCUTANEOUSLY ONCE A WEEK 6/20/21   Anthony Lynne MD   insulin aspart (NOVOLOG FLEXPEN) 100 UNIT/ML injection pen INJECT 2-10 UNITS SUBCUTANEOUSLY THREE TIMES

## 2024-01-31 NOTE — PROGRESS NOTES
Patient received post procedure in stable condition.   Pt is alert and oriented, No distress noted. VSS-see flowsheet.   Patient provided with snack/drink.   No further needs at this time, call light within reach.   MD to talk to patient and family soon.     Pt eating dinner, , sliding scale insulin given per order    Report given to Yemi GALLARDO    Pt transferred back to room 3134    Nicole Christensen RN

## 2024-01-31 NOTE — PROGRESS NOTES
[facility]   Procedure Note    CLINICAL HISTORY AND INDICATIONS:       Albino Uriostegui is a 58 y.o. female with a history of coronary artery disease.  [id number]}.        INFORMED CONSENT:      Informed consent was obtained from the patient and the family members.  I explained to them risks and benefits of the procedure.  I explained to them we will do this from either the common femoral artery access or radial access. I explained to the patient that we will use lidocaine for local anaesthesia and moderate sedation.  I explained to them any risk of perforation of the vessel, stroke, heart attack, bleeding, death and myocardial infarction during the procedure.  The patient understood the risks and benefits and gave informed consent and would like to go ahead with the procedure.    Patient agreed to use dual antiplatelet therapy.      PROCEDURES PERFORMED:     [facility]    PROCEDURE TECHNIQUE:          Femoral Access: Using modified Seldinger technique we approached common femoral artery using 6 Fr sheath access  Diagnostic coronary angiogram performed using a JL4 engaging left coronary and JR4 catheter engaging right coronary artery and performing angiogram. An angled pig tail catheter for LV gram     COMPLICATIONS:  None.      At the end of the procedure a radial band device was used for hemostasis.      EBL: 25 cc    Moderate Sedation:    ASA 2  Mallampati 2      An independent trained observer pushed medications at my direction. We monitored the patient's level of consciousness and vital signs/physiologic status throughout the procedure duration (see start and start times above).       HEMODYNAMICS:  LVEDP 12.  There was no gradient between the left ventricle and aorta.        ANGIOGRAM/CORONARY ARTERIOGRAM:         Right or Left dominant system    1.  The left main coronary artery arising normal fashion from the left coronary cusp giving rise to the left anterior descending artery and the left circumflex artery.   There is MIKE 3 flow.  60% STENOSIS     2.  The right coronary artery arises from right coronary ostium in normal fashion.  The RCA gives rise to RV marginal branch and terminates into a posterior descending artery and posterior lateral branch. There is MIKE 3 flow.  100% STENOSIS    3.  The LAD arises in normal fashion from left coronary giving rise to diagonals and septal branches. There is MIKE 3 flow.  99% STENOSIS    4.  The circumflex was given rise to several OMs and PDA and had luminal irregularities. There is MIKE 3 flow.     100% STENOSIS    LIMA to LAD patent  SVG to OM patent  SVF to right PDA patent  SVG to diagonal patent     But all native vessel are heavily diseased       LEFT VENTRICULOGRAM:      Left ventricular angiogram was done in the 30° BO projection and revealed normal left ventricular wall motion and systolic function with an estimated ejection fraction of 60%.       RECOMMENDATION:  .    1. Recommend medical management.     Zane Correa MD [facility]

## 2024-01-31 NOTE — PROGRESS NOTES
Clinical Pharmacy Note  Heparin Dosing       Lab Results   Component Value Date/Time    ANTIXAUHEP 0.37 01/31/2024 12:43 PM      Lab Results   Component Value Date/Time    HGB 13.1 01/31/2024 12:43 PM    HCT 39.0 01/31/2024 12:43 PM     01/31/2024 12:43 PM    INR 1.19 11/08/2017 05:00 AM       Current Infusion Rate: 1180 units/hr    Plan:  Rate: 1180 units/hr  Next anti-Xa level: 1900 1/31/24    Pharmacy will continue to monitor and adjust based on anti-Xa results.  Christopher Pop RPH, 1/31/2024 1:44 PM

## 2024-01-31 NOTE — PROGRESS NOTES
Clinical Pharmacy Note  Heparin Dosing       Lab Results   Component Value Date/Time    ANTIXAUHEP 0.19 01/31/2024 04:49 AM      Lab Results   Component Value Date/Time    HGB 12.5 01/31/2024 04:49 AM    HCT 37.3 01/31/2024 04:49 AM     01/31/2024 04:49 AM    INR 1.19 11/08/2017 05:00 AM       Current Infusion Rate: 1000 units/hr    Plan:  Bolus: 2000 units  Rate: increase to 1180 units/hr  Next anti-Xa level: 1300 1/31/24    Pharmacy will continue to monitor and adjust based on anti-Xa results.  Kirit Pastor, PharmD

## 2024-01-31 NOTE — PROGRESS NOTES
V2.0    [facility] [place]      Name:  Albino Uriostegui /Age/Sex: 1965  (58 y.o. female)   MRN & CSN:  [id number] & [id number] Encounter Date/Time: 2024 12:02 PM EST   Location:  [id number] PCP: Kelton Fang     Attending:Yue Love MD       Hospital Day: 3    Assessment and Recommendations   Albino Uriostegui is a 58 y.o. female with pmh of  who presents with Chest pain    Chest pain: patient had history of CAD s/p CABG and stents, heparin drip started in the ED, will continue, trend troponin, cardiology consulted, appreciate recs. aspirin, statin, beta-blocker    Discussed with cardiology     NM stress test today     F/u further recommendation from cardiology     Diabetes mellitus type 2: ISS, frequent blood sugar checks     Hypertension: Lisinopril, beta-blocker, hydralazine/labetalol IV as needed     Hypothyroidism: Continue with home med     Admit: Inpatient  Code: Full  DVT proph: SCD, heparin        Diet Diet NPO Exceptions are: Sips of Water with Meds   DVT Prophylaxis [] Lovenox, []  Heparin, [] SCDs, [] Ambulation,  [] Eliquis, [] Xarelto  [] Coumadin   Code Status Full Code   Disposition From:   Expected Disposition:   Estimated Date of Discharge:   Patient requires continued admission due to    Surrogate Decision Maker/ POA       Personally reviewed Lab Studies and Imaging   Telemetry reviewed by myself no ST elevation     Medical Decision Making:  The following items were considered in medical decision making:  Discussion of patient care with other providers  Reviewed clinical lab tests  Reviewed radiology tests  Reviewed other diagnostic tests/interventions  Independent review of radiologic images  Microbiology cultures and other micro tests reviewed          Subjective:     Chief Complaint:     Albino Uriostegui is a 58 y.o. female who presents with     On heparin drip  Denies chest pain or sob at present  Denies vomiting or abdominal pain  NPO      Review of Systems:

## 2024-01-31 NOTE — PLAN OF CARE
Problem: Discharge Planning  Goal: Discharge to home or other facility with appropriate resources  Outcome: Progressing     Problem: Pain  Goal: Verbalizes/displays adequate comfort level or baseline comfort level  Outcome: Progressing     Problem: Safety - Adult  Goal: Free from fall injury  Outcome: Progressing  Flowsheets (Taken 1/31/2024 1117)  Free From Fall Injury: Instruct family/caregiver on patient safety     Problem: ABCDS Injury Assessment  Goal: Absence of physical injury  Outcome: Progressing  Flowsheets (Taken 1/31/2024 1117)  Absence of Physical Injury: Implement safety measures based on patient assessment   Electronically signed by MICHAEL BENAVIDEZ RN on 1/31/2024 at 11:17 AM

## 2024-01-31 NOTE — PROGRESS NOTES
Patient up in bed and aaox4. Patient denies any chest pain this am. Head to toe assessment complete. Morning medications administered with no complications.   Patient remains npo for possible coronary angiogram.  Patient remains on heparin gtt at a rate of  11.8ml.   Family at bedside.  Electronically signed by MICHAEL BENAVIDEZ RN on 1/31/2024 at 11:20 AM

## 2024-01-31 NOTE — PROGRESS NOTES
PRE-PROCEDURE    DATE: 1/31/2024 ARRIVAL TO CATH LAB: 3:33 PM    ADMIT SOURCE: Inpatient    ID & ALLERGY BAND: On    CONSENT: Yes    NPO SINCE: Midnight    LABS: see epic results  PREGNANCY TEST: n/a    PULSES:  Right DP 2+  Right PT 2+  Left DP 1+  Left PT 2+    IV SITE : Patent in R AC.  with fluids infusing at 75 3:33 PM     SURGERIES PLANNED: No    BLEEDING PROBLEMS: No      COMPLIANCE: Yes    LAST DOSE (if applicable):  ASA: 1/31/24  P2Y12 (Plavix, Effient, Brilinta): n/a  Anticoagulants (Coumadin, Xarelto, Eliquis): n/a  Other Blood Thinners: heparin gtt      OTHER MEDICATIONS TAKEN AT HOME:        PATIENT HISTORY    CHIEF COMPLAINT: Chest Pain    EKG:  Yes    Pre CATH Rhythm: Normal Sinus Rhythm    STRESS TEST PREFORMED:  Yes FINDINGS:  Stress Nuclear: Date:  1/31/24  Result:  Positive: Unavailable     EF:      CARDIAC CTA PREFORMED:  No    AGATSTON CORONARY CALCIUM SCORE:   Assessed: No    ECHO: DATE:  No     EF: N/A    PRIOR DIAGNOSTIC PROCEDURE yes,     HYPERTENSION: Yes    DYSLIPIDEMIA: Yes    FAMILY HX OF CAD: Yes    PRIOR MI: Yes.  Most recent date: 2012    PRIOR PCI: Yes.  Most recent date: 9/7/2012    PRIOR CABG: Yes.  Most recent date: 10/30/2017    CEREBRALVASCULAR DX: No    PERIPHERAL ARTERIAL DISEASE: No    CHRONIC LUNG DISEASE: No    TOBACCO: Cigarettes <10 a Day    DIABETIC: Yes, Insulin Treatment    CIALIS/VIAGRA IN PAST 24 HOURS: n/a    CARDIAC ARREST: No    DIALYSIS: No    FRAILTY SCORE: 6 MODERATELY FRAIL (need help with all outside activities and housekeeping, often have problems with stairs, bathing, dressing)    CHEST PAIN SYMPTOM ASSESSMENT:Asymptomatic    CARDIOVASCULAR INSTABILITY: NO    Groins shaved

## 2024-01-31 NOTE — PROGRESS NOTES
[facility]   Daily Progress Note      Admit Date:  1/29/2024    CC: \" I feel fine this morning  Albino Uriostegui is a 58 y.o. patient with prior history of coronary artery disease s/p CABG, s/p stent, diabetes, hypertension, hyperlipidemia, hypothyroidism who presented to the hospital with complaints of left arm pain and squeezing sensation across her chest which happened on 1/29/2020 4 in the morning.  She did not break out into any cold sweats so she denies any shortness of breath.  She did complain of heart palpitation.  Pain would wax and wane.  She presented to the emergency room and her workup showed mildly elevated troponin leading to her admission and the consultation.  She has not been experiencing any exertional chest tightness, shortness of breath orthopnea PND     Interval history 1/31/2024  Pt with no acute overnight events. Denies chest pain, palpitations, and dyspnea.  She just returned from nuclear stress test    Objective:   /74   Pulse 65   Temp 98.3 °F (36.8 °C) (Oral)   Resp 17   Ht 1.727 m (5' 8\")   Wt 87.5 kg (192 lb 14.4 oz)   SpO2 96%   BMI 29.33 kg/m²     Intake/Output Summary (Last 24 hours) at 1/31/2024 1019  Last data filed at 1/30/2024 2102  Gross per 24 hour   Intake 480 ml   Output --   Net 480 ml     Wt Readings from Last 3 Encounters:   01/30/24 87.5 kg (192 lb 14.4 oz)   08/11/21 81.6 kg (179 lb 12.8 oz)   03/22/18 88 kg (194 lb)     Telemetry:nsr    Physical Exam:  General:  NAD, Awake, alert and oriented X4  Skin:  Warm and dry  Neck:  Supple, no JVP appreciated, no bruit  Chest:  Clear to auscultation, no wheezes/rhonchi/rales  Cardiovascular:  Regular rate. S1S2  Abdomen:  Soft, nontender, +bowel sounds  Extremities:  No LE edema    Cardiac Diagnosis:  diabetes, hypertension, hyperlipidemia, and coronary artery disease    Medications:    nitroglycerin  0.5 inch Topical 4 times per day    aspirin EC  81 mg Oral Daily    liothyronine  25 mcg Oral Daily     three-vessel disease with in-stent restenosis of mid  left anterior descending or 80% severity.  7.  Subtotal occlusion of left circumflex.  8.  Proximal right coronary, 80% stenosis.  S/p CABG 10/2017 LIMA-LAD, SVG-D2 and D1, SVG-OM1 and OM2, SVG-PDA.   Assessment & Plan   1) new onset chest pain with borderline troponin elevation suspicious for NSTEMI  -Patient's symptoms and troponin elevation is concerning for progressive atherosclerotic heart disease in a patient with multiple risk factors(known CAD s/p CABG, hypertension, hyperlipidemia, smoking addiction)  Underwent Elvira Myoview scan today.  If nuclear scan is abnormal for ischemia, will proceed with coronary angiography  She seems agreeable  -Will continue aspirin, IV heparin, beta-blocker and statin therapy     Hypertension  -Blood pressure is stable     Diabetes  -Treatment per primary team.     Smoking addiction  -Patient still smokes half pack per day and I have advised her to work on smoking cessation  Complexity of medical decision making-high      Electronically signed by Alcides Sy MD on 1/31/2024 at 10:19 AM

## 2024-02-01 LAB
GLUCOSE BLD-MCNC: 286 MG/DL (ref 70–99)
GLUCOSE BLD-MCNC: 289 MG/DL (ref 70–99)
GLUCOSE BLD-MCNC: 352 MG/DL (ref 70–99)
GLUCOSE BLD-MCNC: 488 MG/DL (ref 70–99)
PERFORMED ON: ABNORMAL

## 2024-02-01 PROCEDURE — 94760 N-INVAS EAR/PLS OXIMETRY 1: CPT

## 2024-02-01 PROCEDURE — 6370000000 HC RX 637 (ALT 250 FOR IP): Performed by: STUDENT IN AN ORGANIZED HEALTH CARE EDUCATION/TRAINING PROGRAM

## 2024-02-01 PROCEDURE — 6370000000 HC RX 637 (ALT 250 FOR IP): Performed by: INTERNAL MEDICINE

## 2024-02-01 PROCEDURE — 1200000000 HC SEMI PRIVATE

## 2024-02-01 PROCEDURE — 2580000003 HC RX 258: Performed by: INTERNAL MEDICINE

## 2024-02-01 PROCEDURE — 6370000000 HC RX 637 (ALT 250 FOR IP): Performed by: NURSE PRACTITIONER

## 2024-02-01 PROCEDURE — 99233 SBSQ HOSP IP/OBS HIGH 50: CPT | Performed by: INTERNAL MEDICINE

## 2024-02-01 PROCEDURE — 6370000000 HC RX 637 (ALT 250 FOR IP): Performed by: ANESTHESIOLOGY

## 2024-02-01 RX ORDER — RANOLAZINE 500 MG/1
500 TABLET, EXTENDED RELEASE ORAL 2 TIMES DAILY
Status: DISCONTINUED | OUTPATIENT
Start: 2024-02-01 | End: 2024-02-02 | Stop reason: HOSPADM

## 2024-02-01 RX ORDER — INSULIN LISPRO 100 [IU]/ML
4 INJECTION, SOLUTION INTRAVENOUS; SUBCUTANEOUS ONCE
Status: COMPLETED | OUTPATIENT
Start: 2024-02-01 | End: 2024-02-01

## 2024-02-01 RX ADMIN — METOPROLOL TARTRATE 12.5 MG: 25 TABLET, FILM COATED ORAL at 21:38

## 2024-02-01 RX ADMIN — LIOTHYRONINE SODIUM 25 MCG: 25 TABLET ORAL at 08:22

## 2024-02-01 RX ADMIN — ASPIRIN 81 MG: 81 TABLET, COATED ORAL at 08:22

## 2024-02-01 RX ADMIN — METOPROLOL TARTRATE 12.5 MG: 25 TABLET, FILM COATED ORAL at 08:25

## 2024-02-01 RX ADMIN — LISINOPRIL 5 MG: 5 TABLET ORAL at 08:25

## 2024-02-01 RX ADMIN — VILAZODONE HYDROCHLORIDE 40 MG: 40 TABLET, FILM COATED ORAL at 08:22

## 2024-02-01 RX ADMIN — RANOLAZINE 500 MG: 500 TABLET, FILM COATED, EXTENDED RELEASE ORAL at 12:20

## 2024-02-01 RX ADMIN — INSULIN LISPRO 8 UNITS: 100 INJECTION, SOLUTION INTRAVENOUS; SUBCUTANEOUS at 08:24

## 2024-02-01 RX ADMIN — ROSUVASTATIN CALCIUM 40 MG: 40 TABLET, FILM COATED ORAL at 08:22

## 2024-02-01 RX ADMIN — SODIUM CHLORIDE, PRESERVATIVE FREE 10 ML: 5 INJECTION INTRAVENOUS at 08:21

## 2024-02-01 RX ADMIN — INSULIN LISPRO 4 UNITS: 100 INJECTION, SOLUTION INTRAVENOUS; SUBCUTANEOUS at 11:58

## 2024-02-01 RX ADMIN — INSULIN LISPRO 4 UNITS: 100 INJECTION, SOLUTION INTRAVENOUS; SUBCUTANEOUS at 23:01

## 2024-02-01 RX ADMIN — INSULIN LISPRO 4 UNITS: 100 INJECTION, SOLUTION INTRAVENOUS; SUBCUTANEOUS at 16:48

## 2024-02-01 RX ADMIN — RANOLAZINE 500 MG: 500 TABLET, FILM COATED, EXTENDED RELEASE ORAL at 21:38

## 2024-02-01 RX ADMIN — NITROGLYCERIN 0.5 INCH: 20 OINTMENT TOPICAL at 06:53

## 2024-02-01 ASSESSMENT — PAIN - FUNCTIONAL ASSESSMENT: PAIN_FUNCTIONAL_ASSESSMENT: ACTIVITIES ARE NOT PREVENTED

## 2024-02-01 ASSESSMENT — PAIN SCALES - WONG BAKER: WONGBAKER_NUMERICALRESPONSE: 0

## 2024-02-01 ASSESSMENT — PAIN SCALES - GENERAL
PAINLEVEL_OUTOF10: 0
PAINLEVEL_OUTOF10: 4
PAINLEVEL_OUTOF10: 0

## 2024-02-01 ASSESSMENT — PAIN DESCRIPTION - FREQUENCY: FREQUENCY: INTERMITTENT

## 2024-02-01 ASSESSMENT — PAIN DESCRIPTION - PAIN TYPE: TYPE: ACUTE PAIN

## 2024-02-01 ASSESSMENT — PAIN DESCRIPTION - DIRECTION: RADIATING_TOWARDS: LEFT ARM

## 2024-02-01 ASSESSMENT — PAIN DESCRIPTION - ONSET: ONSET: ON-GOING

## 2024-02-01 ASSESSMENT — PAIN DESCRIPTION - ORIENTATION: ORIENTATION: LEFT

## 2024-02-01 ASSESSMENT — PAIN DESCRIPTION - LOCATION: LOCATION: CHEST

## 2024-02-01 ASSESSMENT — PAIN DESCRIPTION - DESCRIPTORS: DESCRIPTORS: ACHING;DISCOMFORT

## 2024-02-01 NOTE — PLAN OF CARE
Problem: Discharge Planning  Goal: Discharge to home or other facility with appropriate resources  Outcome: Progressing  Flowsheets (Taken 1/31/2024 1118 by Yemi Fung, RN)  Discharge to home or other facility with appropriate resources:   Arrange for needed discharge resources and transportation as appropriate   Identify barriers to discharge with patient and caregiver     Problem: Pain  Goal: Verbalizes/displays adequate comfort level or baseline comfort level  Outcome: Progressing  Flowsheets (Taken 1/30/2024 1952 by Radha Omer, RN)  Verbalizes/displays adequate comfort level or baseline comfort level: Encourage patient to monitor pain and request assistance     Problem: Safety - Adult  Goal: Free from fall injury  Outcome: Progressing  Flowsheets (Taken 1/31/2024 1117 by Yemi Fung, RN)  Free From Fall Injury: Instruct family/caregiver on patient safety     Problem: ABCDS Injury Assessment  Goal: Absence of physical injury  Outcome: Progressing  Flowsheets (Taken 1/31/2024 1117 by Yemi Fung, RN)  Absence of Physical Injury: Implement safety measures based on patient assessment

## 2024-02-01 NOTE — PROGRESS NOTES
[facility]   Daily Progress Note      Admit Date:  1/29/2024    CC: \" I feel fine this morning  Albino Uriotsegui is a 58 y.o. patient with prior history of coronary artery disease s/p CABG, s/p stent, diabetes, hypertension, hyperlipidemia, hypothyroidism who presented to the hospital with complaints of left arm pain and squeezing sensation across her chest which happened on 1/29/2020 4 in the morning.  She did not break out into any cold sweats so she denies any shortness of breath.  She did complain of heart palpitation.  Pain would wax and wane.  She presented to the emergency room and her workup showed mildly elevated troponin leading to her admission and the consultation.  She has not been experiencing any exertional chest tightness, shortness of breath orthopnea PND     Interval history 1/31/2024  Pt with no acute overnight events. Denies chest pain, palpitations, and dyspnea.  She just returned from nuclear stress test    Interval history 2/1/2024  Patient underwent coronary angiography by Dr. Correa on 1/31/2024.  Bypass grafts are patent but patient has diffuse distal disease.  She is complaining of left-sided chest pain with activity.  Her blood pressure remains soft    Objective:   BP (!) 98/56   Pulse 74   Temp 98.6 °F (37 °C) (Oral)   Resp 18   Ht 1.727 m (5' 8\")   Wt 86.3 kg (190 lb 4.1 oz)   SpO2 97%   BMI 28.93 kg/m²     Intake/Output Summary (Last 24 hours) at 2/1/2024 1159  Last data filed at 2/1/2024 1035  Gross per 24 hour   Intake 970 ml   Output --   Net 970 ml     Wt Readings from Last 3 Encounters:   02/01/24 86.3 kg (190 lb 4.1 oz)   08/11/21 81.6 kg (179 lb 12.8 oz)   03/22/18 88 kg (194 lb)     Telemetry:nsr    Physical Exam:  General:  NAD, Awake, alert and oriented X4  Skin:  Warm and dry  Neck:  Supple, no JVP appreciated, no bruit  Chest:  Clear to auscultation, no wheezes/rhonchi/rales  Cardiovascular:  Regular rate. S1S2  Abdomen:  Soft, nontender, +bowel

## 2024-02-01 NOTE — PROGRESS NOTES
Pt reported intermittent chest pain twice this shift and resolved on its own after resting. Pt states it was same kind of pain that she had, dull. BP little soft. Pt case reviewed with dr almendarez at department and received new orders. Electronically signed by Codey Engel RN on 2/1/2024 at 12:00 PM

## 2024-02-01 NOTE — PROGRESS NOTES
Pt awake and siting up in the chair. Pt reports that she was getting cleaned up earlier in the BR and reported some chest pain/discomfort-pain resolved as she rest. Pt denies any SOB, nausea or vomiting. Pt's scheduled meds are administered to the pt as ordered. Plan of care reviewed with the pt-pt verbalized understandings. Pt sitting up in the chair quietly and denies other needs at this time. Call light and item need in reach. Electronically signed by Codey Engel RN on 2/1/2024 at 8:40 AM'

## 2024-02-01 NOTE — PLAN OF CARE
Problem: Discharge Planning  Goal: Discharge to home or other facility with appropriate resources  2/1/2024 0948 by Codey Engel RN  Outcome: Progressing  Flowsheets  Taken 2/1/2024 0948  Discharge to home or other facility with appropriate resources:   Identify barriers to discharge with patient and caregiver   Arrange for needed discharge resources and transportation as appropriate   Identify discharge learning needs (meds, wound care, etc)  Taken 2/1/2024 0827  Discharge to home or other facility with appropriate resources: Identify barriers to discharge with patient and caregiver  2/1/2024 0143 by Gemma Ballard RN  Outcome: Progressing  Flowsheets (Taken 1/31/2024 1118 by Yemi Fung, RN)  Discharge to home or other facility with appropriate resources:   Arrange for needed discharge resources and transportation as appropriate   Identify barriers to discharge with patient and caregiver     Problem: Pain  Goal: Verbalizes/displays adequate comfort level or baseline comfort level  2/1/2024 0948 by Codey Engel, RN  Outcome: Progressing  Flowsheets (Taken 2/1/2024 0948)  Verbalizes/displays adequate comfort level or baseline comfort level:   Encourage patient to monitor pain and request assistance   Administer analgesics based on type and severity of pain and evaluate response   Assess pain using appropriate pain scale   Implement non-pharmacological measures as appropriate and evaluate response  Note: Pt able to express presence and absence of pain using numerical pain scale. Pt pain is managed by PRN analgesics as ordered by MD. Pain reassess after each interventions. Will continue to monitor as needed.     2/1/2024 0143 by Gemma Ballard RN  Outcome: Progressing  Flowsheets (Taken 1/30/2024 1952 by Radha Omer, RN)  Verbalizes/displays adequate comfort level or baseline comfort level: Encourage patient to monitor pain and request assistance     Problem: Safety - Adult  Goal: Free from fall

## 2024-02-01 NOTE — PROGRESS NOTES
V2.0    [facility] [place] Progress Note      Name:  Albino Uriostegui /Age/Sex: 1965  (58 y.o. female)   MRN & CSN:  [id number] & [id number] Encounter Date/Time: 2024 12:02 PM EST   Location:  [id number] PCP: Kelton Fang     Attending:Yue Love MD       Hospital Day: 4    Assessment and Recommendations   Albino Uriostegui is a 58 y.o. female with pmh of  who presents with Chest pain    Chest pain: patient had history of CAD s/p CABG and stents, heparin drip started in the ED, will continue, trend troponin, cardiology consulted, appreciate recs. aspirin, statin, beta-blocker    Discussed with cardiology     S/p cardiac cath on       ANGIOGRAM/CORONARY ARTERIOGRAM:           Right or Left dominant system     1.  The left main coronary artery arising normal fashion from the left coronary cusp giving rise to the left anterior descending artery and the left circumflex artery.  There is MIKE 3 flow.  60% STENOSIS      2.  The right coronary artery arises from right coronary ostium in normal fashion.  The RCA gives rise to RV marginal branch and terminates into a posterior descending artery and posterior lateral branch. There is MIKE 3 flow.  100% STENOSIS     3.  The LAD arises in normal fashion from left coronary giving rise to diagonals and septal branches. There is MIKE 3 flow.  99% STENOSIS     4.  The circumflex was given rise to several OMs and PDA and had luminal irregularities. There is MIKE 3 flow.                100% STENOSIS     LIMA to LAD patent  SVG to OM patent  SVF to right PDA patent  SVG to diagonal patent      But all native vessel are heavily diseased         LEFT VENTRICULOGRAM:       Left ventricular angiogram was done in the 30° BO projection and revealed normal left ventricular wall motion and systolic function with an estimated ejection fraction of 60%.         RECOMMENDATION:  .     1. Recommend medical management.    24  : follow up with further recommendation

## 2024-02-02 VITALS
OXYGEN SATURATION: 96 % | BODY MASS INDEX: 29.44 KG/M2 | HEIGHT: 68 IN | WEIGHT: 194.22 LBS | RESPIRATION RATE: 14 BRPM | SYSTOLIC BLOOD PRESSURE: 118 MMHG | HEART RATE: 63 BPM | DIASTOLIC BLOOD PRESSURE: 67 MMHG | TEMPERATURE: 98.2 F

## 2024-02-02 LAB
DEPRECATED RDW RBC AUTO: 14.3 % (ref 12.4–15.4)
GLUCOSE BLD-MCNC: 253 MG/DL (ref 70–99)
GLUCOSE BLD-MCNC: 317 MG/DL (ref 70–99)
HCT VFR BLD AUTO: 37.1 % (ref 36–48)
HGB BLD-MCNC: 12.3 G/DL (ref 12–16)
MCH RBC QN AUTO: 30.2 PG (ref 26–34)
MCHC RBC AUTO-ENTMCNC: 33.2 G/DL (ref 31–36)
MCV RBC AUTO: 90.8 FL (ref 80–100)
PERFORMED ON: ABNORMAL
PERFORMED ON: ABNORMAL
PLATELET # BLD AUTO: 282 K/UL (ref 135–450)
PMV BLD AUTO: 8.1 FL (ref 5–10.5)
RBC # BLD AUTO: 4.08 M/UL (ref 4–5.2)
WBC # BLD AUTO: 19.3 K/UL (ref 4–11)

## 2024-02-02 PROCEDURE — 36415 COLL VENOUS BLD VENIPUNCTURE: CPT

## 2024-02-02 PROCEDURE — 6370000000 HC RX 637 (ALT 250 FOR IP): Performed by: INTERNAL MEDICINE

## 2024-02-02 PROCEDURE — 85027 COMPLETE CBC AUTOMATED: CPT

## 2024-02-02 PROCEDURE — 6370000000 HC RX 637 (ALT 250 FOR IP): Performed by: ANESTHESIOLOGY

## 2024-02-02 PROCEDURE — 99233 SBSQ HOSP IP/OBS HIGH 50: CPT | Performed by: INTERNAL MEDICINE

## 2024-02-02 PROCEDURE — 2580000003 HC RX 258: Performed by: INTERNAL MEDICINE

## 2024-02-02 RX ORDER — RANOLAZINE 500 MG/1
500 TABLET, EXTENDED RELEASE ORAL 2 TIMES DAILY
Qty: 60 TABLET | Refills: 3 | Status: SHIPPED | OUTPATIENT
Start: 2024-02-02

## 2024-02-02 RX ORDER — NITROGLYCERIN 0.4 MG/1
0.4 TABLET SUBLINGUAL EVERY 5 MIN PRN
Qty: 25 TABLET | Refills: 3 | Status: SHIPPED | OUTPATIENT
Start: 2024-02-02

## 2024-02-02 RX ADMIN — SODIUM CHLORIDE, PRESERVATIVE FREE 10 ML: 5 INJECTION INTRAVENOUS at 08:27

## 2024-02-02 RX ADMIN — VILAZODONE HYDROCHLORIDE 40 MG: 40 TABLET, FILM COATED ORAL at 08:14

## 2024-02-02 RX ADMIN — INSULIN LISPRO 6 UNITS: 100 INJECTION, SOLUTION INTRAVENOUS; SUBCUTANEOUS at 11:43

## 2024-02-02 RX ADMIN — METOPROLOL TARTRATE 12.5 MG: 25 TABLET, FILM COATED ORAL at 08:15

## 2024-02-02 RX ADMIN — LISINOPRIL 5 MG: 5 TABLET ORAL at 11:42

## 2024-02-02 RX ADMIN — ROSUVASTATIN CALCIUM 40 MG: 40 TABLET, FILM COATED ORAL at 08:15

## 2024-02-02 RX ADMIN — RANOLAZINE 500 MG: 500 TABLET, FILM COATED, EXTENDED RELEASE ORAL at 08:14

## 2024-02-02 RX ADMIN — LIOTHYRONINE SODIUM 25 MCG: 25 TABLET ORAL at 08:14

## 2024-02-02 RX ADMIN — ASPIRIN 81 MG: 81 TABLET, COATED ORAL at 08:15

## 2024-02-02 RX ADMIN — INSULIN LISPRO 4 UNITS: 100 INJECTION, SOLUTION INTRAVENOUS; SUBCUTANEOUS at 08:16

## 2024-02-02 ASSESSMENT — PAIN SCALES - GENERAL: PAINLEVEL_OUTOF10: 0

## 2024-02-02 ASSESSMENT — PAIN SCALES - WONG BAKER: WONGBAKER_NUMERICALRESPONSE: 0

## 2024-02-02 NOTE — DISCHARGE SUMMARY
Hospital Medicine Discharge Summary    Patient ID: Albino Uriostegui      Patient's PCP: Jade Unger APRN    Admit Date: 1/29/2024     Discharge Date:  2/2/24    Admitting Physician: Chelsea Caceres DO     Discharge Physician: Yue Love MD     Discharge Diagnoses:       Active Hospital Problems    Diagnosis     Chest pain [R07.9]     S/P CABG (coronary artery bypass graft) [Z95.1]        The patient was seen and examined on day of discharge and this discharge summary is in conjunction with any daily progress note from day of discharge.    Hospital Course:     Albino Uriostegui is a 58 y.o. female who is presenting with chest pain that started today at this morning.  Patient describes the pain as squeezing and radiates to the left arm.  She has history of CABG and stents placement.  She still smokes half pack per day.  When I saw the patient she was comfortable in bed, awake alert oriented x 3 on room air.  Chest pain was resolved. In the ED patient was afebrile, .  Labs showed troponin 20>>19, white count 12.1.  EKG without acute ST changes.  Chest x-ray negative for acute findings.  Patient was started on a heparin drip and aspirin.    S/p cardiac cath by  Dr Correa           Cardiac cath 2/1/2024     1.  The left main coronary artery arising normal fashion from the left coronary cusp giving rise to the left anterior descending artery and the left circumflex artery.  There is MIKE 3 flow.  60% STENOSIS      2.  The right coronary artery arises from right coronary ostium in normal fashion.  The RCA gives rise to RV marginal branch and terminates into a posterior descending artery and posterior lateral branch. There is MIKE 3 flow.  100% STENOSIS     3.  The LAD arises in normal fashion from left coronary giving rise to diagonals and septal branches. There is MIKE 3 flow.  99% STENOSIS     4.  The circumflex was given rise to several OMs and PDA and had luminal irregularities. There is MIKE 3  lesions.  Neurologic:  Neurovascularly intact without any focal sensory/motor deficits. Cranial nerves: II-XII intact, grossly non-focal.  Psychiatric:  Alert and oriented, thought content appropriate, normal insight  Capillary Refill: Brisk,< 3 seconds   Peripheral Pulses: +2 palpable, equal bilaterally       Labs: For convenience and continuity at follow-up the following most recent labs are provided:      CBC:    Lab Results   Component Value Date/Time    WBC 19.3 02/02/2024 04:13 AM    HGB 12.3 02/02/2024 04:13 AM    HCT 37.1 02/02/2024 04:13 AM     02/02/2024 04:13 AM       Renal:    Lab Results   Component Value Date/Time     01/31/2024 12:43 PM    K 4.2 01/31/2024 12:43 PM    K 4.0 01/30/2024 04:17 AM     01/31/2024 12:43 PM    CO2 26 01/31/2024 12:43 PM    BUN 17 01/31/2024 12:43 PM    CREATININE 1.0 01/31/2024 12:43 PM    CALCIUM 9.1 01/31/2024 12:43 PM         Significant Diagnostic Studies    Radiology:   NM Cardiac Stress Test Nuclear Imaging   Final Result      XR CHEST (2 VW)   Final Result   No radiographic evidence of acute cardiopulmonary pathology.                Consults:     IP CONSULT TO CARDIOLOGY    Disposition:  home    Condition at Discharge: Stable    Discharge Instructions/Follow-up:      PCP follow up in 1-2 weeks    Follow up with Dr Yossi Mcgrath , cardiologist in 4 weeks    Code Status:  Full Code     Activity: activity as tolerated    Diet: cardiac diet  Diabetic diet      Discharge Medications:     Current Discharge Medication List             Details   ranolazine (RANEXA) 500 MG extended release tablet Take 1 tablet by mouth 2 times daily  Qty: 60 tablet, Refills: 3      metoprolol tartrate (LOPRESSOR) 25 MG tablet Take 0.5 tablets by mouth 2 times daily  Qty: 60 tablet, Refills: 3      nitroGLYCERIN (NITROSTAT) 0.4 MG SL tablet Place 1 tablet under the tongue every 5 minutes as needed for Chest pain up to max of 3 total doses. If no relief after 1 dose, call

## 2024-02-02 NOTE — PROGRESS NOTES
Physician Progress Note      PATIENT:               DAHIANA CUNNINGHAM  [facility] #:                  [id number]  :                       1965  ADMIT DATE:       2024 5:24 PM  DISCH DATE:  RESPONDING  PROVIDER #:        Yue Love MD          QUERY TEXT:    Pt admitted with chest pain.  Noted documentation of \" new onset chest pain   with borderline troponin elevation suspicious for NSTEMI\" by cardiology .    If possible, please document in progress notes and discharge summary:      The medical record reflects the following:  Risk Factors: CAD s/p CABG, HTN, HLD, smoker, DM  Clinical Indicators: Troponins /  Stress test -  \"There is a medium sized, mild to moderate intensity, reversible defect in  the anteroapical/inferoapical walls suggestive of myocardium at risk\"  Cardiology -  \"new onset chest pain with borderline troponin elevation suspicious for NSTEMI  -Patient's symptoms and troponin elevation is concerning for progressive   atherosclerotic heart disease in a patient with multiple risk factors(known   CAD s/p CABG, hypertension, hyperlipidemia, smoking addiction)  Cardiac cath shows patent bypass graft with diffuse distal disease\"  Cath -  \"Cardiac cath 2024    1.  The left main coronary artery arising normal fashion from the left   coronary cusp giving rise to the left anterior descending artery and the left   circumflex artery.  There is MIKE 3 flow.  60% STENOSIS    2.  The right coronary artery arises from right coronary ostium in normal   fashion.  The RCA gives rise to RV marginal branch and terminates into a   posterior descending artery and posterior lateral branch. There is MIKE 3   flow.  100% STENOSIS    3.  The LAD arises in normal fashion from left coronary giving rise to   diagonals and septal branches. There is MIKE 3 flow.  99% STENOSIS    4.  The circumflex was given rise to several OMs and PDA and had luminal   irregularities. There is MIKE 3

## 2024-02-02 NOTE — PROGRESS NOTES
Patient A & O x4, in chair at this time eating breakfast, R IV flushed with no resistance, morning medications given with no complications. No reports of pain and bleeding precautions in place.       Electronically signed by Kavya Mi RN on 2/2/2024 at 10:16 AM

## 2024-02-02 NOTE — CARE COORDINATION
Case Management Assessment  Initial Evaluation    Date/Time of Evaluation: 2/2/2024 11:20 AM  Assessment Completed by: JATINDER Natarajan, BUZZ    If patient is discharged prior to next notation, then this note serves as note for discharge by case management.    Patient Name: Albino Uriostegui                     YOB: 1965  Diagnosis: Chest pain [R07.9]  Pain of left upper extremity [M79.602]  Chest pain, unspecified type [R07.9]                     Date / Time: 1/29/2024  5:24 PM    Patient Admission Status: Inpatient   Readmission Risk (Low < 19, Mod (19-27), High > 27): Readmission Risk Score: 7.1    Current PCP: Jade Unger APRN  PCP verified by CM? Yes    Chart Reviewed: Yes      History Provided by: Patient  Patient Orientation: Alert and Oriented    Patient Cognition: Alert    Hospitalization in the last 30 days (Readmission):  No    If yes, Readmission Assessment in CM Navigator will be completed.    Advance Directives:      Code Status: Full Code   Patient's Primary Decision Maker is: Legal Next of Kin      Discharge Planning:    Patient lives with: Spouse/Significant Other Type of Home: House  Primary Care Giver: Self  Patient Support Systems include: Spouse/Significant Other   Current Financial resources: None  Current community resources: None  Current services prior to admission: None            Current DME:              Type of Home Care services:  None    ADLS  Prior functional level:    Current functional level: Independent in ADLs/IADLs    PT AM-PAC:   /24  OT AM-PAC:   /24    Family can provide assistance at DC: Yes  Would you like Case Management to discuss the discharge plan with any other family members/significant others, and if so, who? No  Plans to Return to Present Housing: Yes  Other Identified Issues/Barriers to RETURNING to current housing: None noted at this time.   Potential Assistance needed at discharge: Prescription Assistance            Potential DME:  None noted.    Patient expects to discharge to: House  Plan for transportation at discharge: Family    Financial  Payor: BCBS / Plan: BCBS OUT OF STATE / Product Type: *No Product type* /     Does insurance require precert for SNF: Yes    Potential assistance Purchasing Medications: No  Meds-to-Beds request:        [facility] [id number]  [place] - [address] - P [phone] - F [phone]  [address] [place] [address]  Phone: [phone] Fax: [phone]      Notes:    Factors facilitating achievement of predicted outcomes: Motivated, Cooperative, Pleasant, Good insight into deficits, and Knowledge about rehab    Barriers to discharge: None noted at this time.     Additional Case Management Notes:   1) Waiting on cardiology clearance.   2) Discharge to home with family.     The Plan for Transition of Care is related to the following treatment goals of Chest pain [R07.9]  Pain of left upper extremity [M79.602]  Chest pain, unspecified type [R07.9]    The Patient and/or Patient Representative Agree with the Discharge Plan?  Yes.     Respectfully submitted,    Lety TOBIAS, PATEL  [facility] [place]   [phone]    Electronically signed by JATINDER Natarajan, BUZZ on 2/2/2024 at 11:20 AM

## 2024-02-02 NOTE — PROGRESS NOTES
Discharge note: Medication reconciliation done and patient was educated on signs/symptoms of MI/stroke. Patient verbalized understanding. IV removed. Patient wheeled down to car.       Electronically signed by Kavya Mi RN on 2/2/2024 at 12:41 PM

## 2024-02-02 NOTE — PROGRESS NOTES
[facility]   Daily Progress Note      Admit Date:  1/29/2024    CC: \" I feel fine this morning  Albino Uriostegui is a 58 y.o. patient with prior history of coronary artery disease s/p CABG, s/p stent, diabetes, hypertension, hyperlipidemia, hypothyroidism who presented to the hospital with complaints of left arm pain and squeezing sensation across her chest which happened on 1/29/2020 4 in the morning.  She did not break out into any cold sweats so she denies any shortness of breath.  She did complain of heart palpitation.  Pain would wax and wane.  She presented to the emergency room and her workup showed mildly elevated troponin leading to her admission and the consultation.  She has not been experiencing any exertional chest tightness, shortness of breath orthopnea PND     Interval history 1/31/2024  Pt with no acute overnight events. Denies chest pain, palpitations, and dyspnea.  She just returned from nuclear stress test    Interval history 2/1/2024  Patient underwent coronary angiography by Dr. Correa on 1/31/2024.  Bypass grafts are patent but patient has diffuse distal disease.  She is complaining of left-sided chest pain with activity.  Her blood pressure remains soft    Interval history 2/2/24  Patient feels better this morning.  She had 1 episode of chest tightness yesterday but overall her symptoms have improved.  Blood pressure is also improved this morning      Objective:   /70   Pulse 67   Temp 98.3 °F (36.8 °C) (Oral)   Resp 14   Ht 1.727 m (5' 8\")   Wt 88.1 kg (194 lb 3.6 oz)   SpO2 97%   BMI 29.53 kg/m²     Intake/Output Summary (Last 24 hours) at 2/2/2024 0945  Last data filed at 2/1/2024 1439  Gross per 24 hour   Intake 1080 ml   Output --   Net 1080 ml     Wt Readings from Last 3 Encounters:   02/02/24 88.1 kg (194 lb 3.6 oz)   08/11/21 81.6 kg (179 lb 12.8 oz)   03/22/18 88 kg (194 lb)     Telemetry:nsr    Physical Exam:  General:  NAD, Awake, alert and oriented

## 2024-02-02 NOTE — CARE COORDINATION
Name: Nedra Gant YOB: 2015    Age: 4  y.o. 10  m.o.   Date of Appointment: 7/16/2020 Gender: Female      Examiner: Charity Montana Ph.D., [facility]      Length of Session: 35 minutes    CPT code: 52429    Individual(s) Present During Appointment:  Biological Mother      REFERRAL REASON  Nedra was evaluated due to concerns regarding developmental concerns, particularly relating to symptoms of Autism Spectrum Disorder    Session Summary:  The primary goal was to discuss recommendations for intervention and treatment planning. Diagnostic information based on assessment results was also provided during this session. A written summary was provided to Nedra's mother. Treatment recommendations were discussed and community resources were identified (I.e., DAVID therapy, continued speech and occupational therapy, social skills groups). Family was given the opportunity to ask questions and express concerns. Ms. Davies was in agreement with the assessment results.      DIAGNOSTIC IMPRESSION:  Based on the testing completed and background information provided, the current diagnostic impression is: F84.0 Autism Spectrum Disorder     Plan:   This patient is discharged from testing.Complete psychological assessment is scanned into electronic chart, which includes assessment results, final diagnostic information, and the recommendations that were discussed during this session. The therapist will remain available for further consultation as needed.          A quick chart review reveals that this patient is a likely discharge this afternoon but we have not assessed her. We will speak with patient this morning.     Respectfully submitted,    Lety TOBIAS, [facility]   [phone]    Electronically signed by JATINDER Natarajan, LSW on 2/2/2024 at 8:48 AM

## 2024-02-05 NOTE — PROCEDURES
[facility]   Procedure Note    CLINICAL HISTORY AND INDICATIONS:       Albino Uriostegui is a 58 y.o. female with a history of coronary artery disease.  [id number]}.        INFORMED CONSENT:      Informed consent was obtained from the patient and the family members.  I explained to them risks and benefits of the procedure.  I explained to them we will do this from either the common femoral artery access or radial access. I explained to the patient that we will use lidocaine for local anaesthesia and moderate sedation.  I explained to them any risk of perforation of the vessel, stroke, heart attack, bleeding, death and myocardial infarction during the procedure.  The patient understood the risks and benefits and gave informed consent and would like to go ahead with the procedure.    Patient agreed to use dual antiplatelet therapy.      PROCEDURES PERFORMED:     [facility]    PROCEDURE TECHNIQUE:          Femoral Access: Using modified Seldinger technique we approached common femoral artery using 6 Fr sheath access  Diagnostic coronary angiogram performed using a JL4 engaging left coronary and JR4 catheter engaging right coronary artery and performing angiogram. An angled pig tail catheter for LV gram     COMPLICATIONS:  None.      At the end of the procedure a radial band device was used for hemostasis.      EBL: 25 cc    Moderate Sedation:    ASA 2  Mallampati 2      An independent trained observer pushed medications at my direction. We monitored the patient's level of consciousness and vital signs/physiologic status throughout the procedure duration (see start and start times above).       HEMODYNAMICS:  LVEDP 12.  There was no gradient between the left ventricle and aorta.        ANGIOGRAM/CORONARY ARTERIOGRAM:         Right or Left dominant system    1.  The left main coronary artery arising normal fashion from the left coronary cusp giving rise to the left anterior descending artery and the left circumflex artery.

## 2024-02-26 NOTE — PROGRESS NOTES
[facility]      Cardiology Progress Note     Albino Uriostegui  1965 March 8, 2024      CC:     HPI:  The patient is 58 y.o. female with a past medical history significant for coronary artery disease s/p CABG,10/30/2017 CABGX6 (LIMA-LAD, SVG-D2-D1, SVG-OM1-OM2, SVG-PDA)  s/p stent, diabetes, hypertension, hyperlipidemia, hypothyroidism who presented to the hospital 1/29/24 with complaints of left arm pain and squeezing sensation across her chest which happened on 1/29/2024 in the morning.  She did not break out into any cold sweats so she denies any shortness of breath.  She did complain of heart palpitation.  Pain would wax and wane.  She presented to the emergency room and her workup showed mildly elevated troponin leading to her admission and the consultation.  She has not been experiencing any exertional chest tightness, shortness of breath orthopnea PND. LHC performed per Dr. Correa revealing ESPARZA to LAD patent, SVG to OM patent, SVF to R PDA patent. Started on Ranexa for diffuse distal disease.     Patient is here for her follow-up after recent hospitalization.  She was admitted to [facility] with unstable angina.  She underwent coronary angiography which showed patent bypass graft with diffusely diseased native coronary arteries.  She is being treated medically.  She still continues to have off-and-on anginal-like symptoms and has been taking baby aspirin.  Along with her other scheduled cardiac meds.  She is also complaining of bilateral calf pains with exertion         Past Medical History:   Diagnosis Date    CAD (coronary artery disease)     multiple TIESHA    Chest pain     Nuc GXT 8/22/12 normal.  Echo 8/21/12 LVEF 60-65%    Depression     Diabetes mellitus (HCC)     managed by PCP    Family history of early CAD     Female pelvic pain 2009    HTN (hypertension)     controlled    Hyperlipidemia     rec semi annual lipids with LDL goal <70    Hypothyroidism     managed by PCP    Rectal bleeding

## 2024-03-08 ENCOUNTER — OFFICE VISIT (OUTPATIENT)
Dept: CARDIOLOGY CLINIC | Age: 59
End: 2024-03-08
Payer: COMMERCIAL

## 2024-03-08 VITALS
HEIGHT: 68 IN | DIASTOLIC BLOOD PRESSURE: 64 MMHG | WEIGHT: 198 LBS | SYSTOLIC BLOOD PRESSURE: 122 MMHG | OXYGEN SATURATION: 98 % | BODY MASS INDEX: 30.01 KG/M2 | HEART RATE: 76 BPM

## 2024-03-08 DIAGNOSIS — I73.9 CLAUDICATION (HCC): ICD-10-CM

## 2024-03-08 DIAGNOSIS — F17.219 CIGARETTE NICOTINE DEPENDENCE WITH NICOTINE-INDUCED DISORDER: ICD-10-CM

## 2024-03-08 DIAGNOSIS — E11.69 TYPE 2 DIABETES MELLITUS WITH OTHER SPECIFIED COMPLICATION, WITHOUT LONG-TERM CURRENT USE OF INSULIN (HCC): ICD-10-CM

## 2024-03-08 DIAGNOSIS — E78.5 HYPERLIPIDEMIA, UNSPECIFIED HYPERLIPIDEMIA TYPE: ICD-10-CM

## 2024-03-08 DIAGNOSIS — E66.3 OVER WEIGHT: ICD-10-CM

## 2024-03-08 DIAGNOSIS — I25.810 CORONARY ARTERY DISEASE INVOLVING CORONARY BYPASS GRAFT OF NATIVE HEART WITHOUT ANGINA PECTORIS: Primary | ICD-10-CM

## 2024-03-08 DIAGNOSIS — I10 ESSENTIAL HYPERTENSION: ICD-10-CM

## 2024-03-08 PROCEDURE — 99214 OFFICE O/P EST MOD 30 MIN: CPT | Performed by: INTERNAL MEDICINE

## 2024-03-08 PROCEDURE — 3078F DIAST BP <80 MM HG: CPT | Performed by: INTERNAL MEDICINE

## 2024-03-08 PROCEDURE — 3074F SYST BP LT 130 MM HG: CPT | Performed by: INTERNAL MEDICINE

## 2024-03-08 PROCEDURE — 93000 ELECTROCARDIOGRAM COMPLETE: CPT | Performed by: INTERNAL MEDICINE

## 2024-03-08 RX ORDER — ISOSORBIDE MONONITRATE 60 MG/1
60 TABLET, EXTENDED RELEASE ORAL DAILY
Qty: 30 TABLET | Refills: 3 | Status: SHIPPED | OUTPATIENT
Start: 2024-03-08

## 2024-03-16 ENCOUNTER — HOSPITAL ENCOUNTER (INPATIENT)
Age: 59
LOS: 1 days | Discharge: HOME | DRG: 280 | End: 2024-03-19
Attending: EMERGENCY MEDICINE | Admitting: INTERNAL MEDICINE
Payer: COMMERCIAL

## 2024-03-16 ENCOUNTER — APPOINTMENT (OUTPATIENT)
Dept: GENERAL RADIOLOGY | Age: 59
DRG: 280 | End: 2024-03-16
Payer: COMMERCIAL

## 2024-03-16 DIAGNOSIS — I25.10 CORONARY ARTERY DISEASE INVOLVING NATIVE CORONARY ARTERY OF NATIVE HEART, UNSPECIFIED WHETHER ANGINA PRESENT: ICD-10-CM

## 2024-03-16 DIAGNOSIS — R07.9 CHEST PAIN, UNSPECIFIED TYPE: Primary | ICD-10-CM

## 2024-03-16 DIAGNOSIS — R79.89 ELEVATED TROPONIN I LEVEL: ICD-10-CM

## 2024-03-16 LAB
ANION GAP SERPL CALCULATED.3IONS-SCNC: 10 MMOL/L (ref 3–16)
BASOPHILS # BLD: 0.1 K/UL (ref 0–0.2)
BASOPHILS NFR BLD: 0.9 %
BUN SERPL-MCNC: 23 MG/DL (ref 7–20)
CALCIUM SERPL-MCNC: 9.4 MG/DL (ref 8.3–10.6)
CHLORIDE SERPL-SCNC: 103 MMOL/L (ref 99–110)
CO2 SERPL-SCNC: 24 MMOL/L (ref 21–32)
CREAT SERPL-MCNC: 1 MG/DL (ref 0.6–1.1)
DEPRECATED RDW RBC AUTO: 13.8 % (ref 12.4–15.4)
EOSINOPHIL # BLD: 0.3 K/UL (ref 0–0.6)
EOSINOPHIL NFR BLD: 2.5 %
GFR SERPLBLD CREATININE-BSD FMLA CKD-EPI: >60 ML/MIN/{1.73_M2}
GLUCOSE BLD-MCNC: 264 MG/DL (ref 70–99)
GLUCOSE SERPL-MCNC: 306 MG/DL (ref 70–99)
HCT VFR BLD AUTO: 37 % (ref 36–48)
HGB BLD-MCNC: 12.4 G/DL (ref 12–16)
LYMPHOCYTES # BLD: 2.7 K/UL (ref 1–5.1)
LYMPHOCYTES NFR BLD: 21.2 %
MCH RBC QN AUTO: 29.3 PG (ref 26–34)
MCHC RBC AUTO-ENTMCNC: 33.5 G/DL (ref 31–36)
MCV RBC AUTO: 87.4 FL (ref 80–100)
MONOCYTES # BLD: 1.1 K/UL (ref 0–1.3)
MONOCYTES NFR BLD: 8.6 %
NEUTROPHILS # BLD: 8.5 K/UL (ref 1.7–7.7)
NEUTROPHILS NFR BLD: 66.8 %
NT-PROBNP SERPL-MCNC: 936 PG/ML (ref 0–124)
PERFORMED ON: ABNORMAL
PLATELET # BLD AUTO: 364 K/UL (ref 135–450)
PMV BLD AUTO: 7.6 FL (ref 5–10.5)
POTASSIUM SERPL-SCNC: 4.5 MMOL/L (ref 3.5–5.1)
RBC # BLD AUTO: 4.23 M/UL (ref 4–5.2)
SODIUM SERPL-SCNC: 137 MMOL/L (ref 136–145)
TROPONIN, HIGH SENSITIVITY: 27 NG/L (ref 0–14)
TROPONIN, HIGH SENSITIVITY: 28 NG/L (ref 0–14)
WBC # BLD AUTO: 12.7 K/UL (ref 4–11)

## 2024-03-16 PROCEDURE — 84484 ASSAY OF TROPONIN QUANT: CPT

## 2024-03-16 PROCEDURE — 6360000002 HC RX W HCPCS: Performed by: PHYSICIAN ASSISTANT

## 2024-03-16 PROCEDURE — 6370000000 HC RX 637 (ALT 250 FOR IP): Performed by: HOSPITALIST

## 2024-03-16 PROCEDURE — 6370000000 HC RX 637 (ALT 250 FOR IP): Performed by: EMERGENCY MEDICINE

## 2024-03-16 PROCEDURE — 85025 COMPLETE CBC W/AUTO DIFF WBC: CPT

## 2024-03-16 PROCEDURE — G0378 HOSPITAL OBSERVATION PER HR: HCPCS

## 2024-03-16 PROCEDURE — 83880 ASSAY OF NATRIURETIC PEPTIDE: CPT

## 2024-03-16 PROCEDURE — 83036 HEMOGLOBIN GLYCOSYLATED A1C: CPT

## 2024-03-16 PROCEDURE — 96374 THER/PROPH/DIAG INJ IV PUSH: CPT

## 2024-03-16 PROCEDURE — 71045 X-RAY EXAM CHEST 1 VIEW: CPT

## 2024-03-16 PROCEDURE — 96375 TX/PRO/DX INJ NEW DRUG ADDON: CPT

## 2024-03-16 PROCEDURE — 80048 BASIC METABOLIC PNL TOTAL CA: CPT

## 2024-03-16 PROCEDURE — 93005 ELECTROCARDIOGRAM TRACING: CPT | Performed by: EMERGENCY MEDICINE

## 2024-03-16 PROCEDURE — 36415 COLL VENOUS BLD VENIPUNCTURE: CPT

## 2024-03-16 PROCEDURE — 99285 EMERGENCY DEPT VISIT HI MDM: CPT

## 2024-03-16 RX ORDER — ACETAMINOPHEN 650 MG/1
650 SUPPOSITORY RECTAL EVERY 6 HOURS PRN
Status: DISCONTINUED | OUTPATIENT
Start: 2024-03-16 | End: 2024-03-19 | Stop reason: HOSPADM

## 2024-03-16 RX ORDER — ONDANSETRON 2 MG/ML
4 INJECTION INTRAMUSCULAR; INTRAVENOUS ONCE
Status: COMPLETED | OUTPATIENT
Start: 2024-03-16 | End: 2024-03-16

## 2024-03-16 RX ORDER — SODIUM CHLORIDE 9 MG/ML
INJECTION, SOLUTION INTRAVENOUS PRN
Status: DISCONTINUED | OUTPATIENT
Start: 2024-03-16 | End: 2024-03-19 | Stop reason: HOSPADM

## 2024-03-16 RX ORDER — MAGNESIUM SULFATE IN WATER 40 MG/ML
2000 INJECTION, SOLUTION INTRAVENOUS PRN
Status: DISCONTINUED | OUTPATIENT
Start: 2024-03-16 | End: 2024-03-19 | Stop reason: HOSPADM

## 2024-03-16 RX ORDER — POLYETHYLENE GLYCOL 3350 17 G/17G
17 POWDER, FOR SOLUTION ORAL DAILY PRN
Status: DISCONTINUED | OUTPATIENT
Start: 2024-03-16 | End: 2024-03-19 | Stop reason: HOSPADM

## 2024-03-16 RX ORDER — INSULIN LISPRO 100 [IU]/ML
0.08 INJECTION, SOLUTION INTRAVENOUS; SUBCUTANEOUS
Status: DISCONTINUED | OUTPATIENT
Start: 2024-03-17 | End: 2024-03-19 | Stop reason: HOSPADM

## 2024-03-16 RX ORDER — SODIUM CHLORIDE 0.9 % (FLUSH) 0.9 %
5-40 SYRINGE (ML) INJECTION PRN
Status: DISCONTINUED | OUTPATIENT
Start: 2024-03-16 | End: 2024-03-19 | Stop reason: HOSPADM

## 2024-03-16 RX ORDER — INSULIN LISPRO 100 [IU]/ML
0-4 INJECTION, SOLUTION INTRAVENOUS; SUBCUTANEOUS
Status: DISCONTINUED | OUTPATIENT
Start: 2024-03-17 | End: 2024-03-19 | Stop reason: HOSPADM

## 2024-03-16 RX ORDER — SODIUM CHLORIDE 0.9 % (FLUSH) 0.9 %
5-40 SYRINGE (ML) INJECTION EVERY 12 HOURS SCHEDULED
Status: DISCONTINUED | OUTPATIENT
Start: 2024-03-16 | End: 2024-03-19 | Stop reason: HOSPADM

## 2024-03-16 RX ORDER — POTASSIUM CHLORIDE 7.45 MG/ML
10 INJECTION INTRAVENOUS PRN
Status: DISCONTINUED | OUTPATIENT
Start: 2024-03-16 | End: 2024-03-19 | Stop reason: HOSPADM

## 2024-03-16 RX ORDER — DEXTROSE MONOHYDRATE 100 MG/ML
INJECTION, SOLUTION INTRAVENOUS CONTINUOUS PRN
Status: DISCONTINUED | OUTPATIENT
Start: 2024-03-16 | End: 2024-03-19 | Stop reason: HOSPADM

## 2024-03-16 RX ORDER — ATORVASTATIN CALCIUM 80 MG/1
80 TABLET, FILM COATED ORAL NIGHTLY
Status: DISCONTINUED | OUTPATIENT
Start: 2024-03-16 | End: 2024-03-17

## 2024-03-16 RX ORDER — ONDANSETRON 2 MG/ML
4 INJECTION INTRAMUSCULAR; INTRAVENOUS EVERY 6 HOURS PRN
Status: DISCONTINUED | OUTPATIENT
Start: 2024-03-16 | End: 2024-03-19 | Stop reason: HOSPADM

## 2024-03-16 RX ORDER — POTASSIUM CHLORIDE 20 MEQ/1
40 TABLET, EXTENDED RELEASE ORAL PRN
Status: DISCONTINUED | OUTPATIENT
Start: 2024-03-16 | End: 2024-03-19 | Stop reason: HOSPADM

## 2024-03-16 RX ORDER — NITROGLYCERIN 0.4 MG/1
0.4 TABLET SUBLINGUAL EVERY 5 MIN PRN
Status: DISCONTINUED | OUTPATIENT
Start: 2024-03-16 | End: 2024-03-19 | Stop reason: HOSPADM

## 2024-03-16 RX ORDER — MORPHINE SULFATE 10 MG/ML
6 INJECTION, SOLUTION INTRAMUSCULAR; INTRAVENOUS ONCE
Status: COMPLETED | OUTPATIENT
Start: 2024-03-16 | End: 2024-03-16

## 2024-03-16 RX ORDER — ASPIRIN 81 MG/1
81 TABLET, CHEWABLE ORAL DAILY
Status: DISCONTINUED | OUTPATIENT
Start: 2024-03-17 | End: 2024-03-19 | Stop reason: HOSPADM

## 2024-03-16 RX ORDER — INSULIN GLARGINE 100 [IU]/ML
0.25 INJECTION, SOLUTION SUBCUTANEOUS NIGHTLY
Status: DISCONTINUED | OUTPATIENT
Start: 2024-03-16 | End: 2024-03-18

## 2024-03-16 RX ORDER — ONDANSETRON 4 MG/1
4 TABLET, ORALLY DISINTEGRATING ORAL EVERY 8 HOURS PRN
Status: DISCONTINUED | OUTPATIENT
Start: 2024-03-16 | End: 2024-03-19 | Stop reason: HOSPADM

## 2024-03-16 RX ORDER — INSULIN LISPRO 100 [IU]/ML
0-4 INJECTION, SOLUTION INTRAVENOUS; SUBCUTANEOUS NIGHTLY
Status: DISCONTINUED | OUTPATIENT
Start: 2024-03-16 | End: 2024-03-19 | Stop reason: HOSPADM

## 2024-03-16 RX ORDER — ENOXAPARIN SODIUM 100 MG/ML
40 INJECTION SUBCUTANEOUS DAILY
Status: DISCONTINUED | OUTPATIENT
Start: 2024-03-17 | End: 2024-03-17

## 2024-03-16 RX ORDER — ACETAMINOPHEN 325 MG/1
650 TABLET ORAL EVERY 6 HOURS PRN
Status: DISCONTINUED | OUTPATIENT
Start: 2024-03-16 | End: 2024-03-19 | Stop reason: HOSPADM

## 2024-03-16 RX ORDER — GLUCAGON 1 MG/ML
1 KIT INJECTION PRN
Status: DISCONTINUED | OUTPATIENT
Start: 2024-03-16 | End: 2024-03-19 | Stop reason: HOSPADM

## 2024-03-16 RX ADMIN — NITROGLYCERIN 1 INCH: 20 OINTMENT TOPICAL at 19:23

## 2024-03-16 RX ADMIN — MORPHINE SULFATE 6 MG: 10 INJECTION, SOLUTION INTRAMUSCULAR; INTRAVENOUS at 18:52

## 2024-03-16 RX ADMIN — ONDANSETRON 4 MG: 2 INJECTION INTRAMUSCULAR; INTRAVENOUS at 18:52

## 2024-03-16 RX ADMIN — INSULIN GLARGINE 22 UNITS: 100 INJECTION, SOLUTION SUBCUTANEOUS at 22:39

## 2024-03-16 RX ADMIN — ATORVASTATIN CALCIUM 80 MG: 80 TABLET, FILM COATED ORAL at 22:39

## 2024-03-16 ASSESSMENT — PAIN DESCRIPTION - DESCRIPTORS: DESCRIPTORS: CRUSHING

## 2024-03-16 ASSESSMENT — PAIN SCALES - GENERAL
PAINLEVEL_OUTOF10: 2
PAINLEVEL_OUTOF10: 7
PAINLEVEL_OUTOF10: 0
PAINLEVEL_OUTOF10: 7
PAINLEVEL_OUTOF10: 0

## 2024-03-16 ASSESSMENT — PAIN - FUNCTIONAL ASSESSMENT: PAIN_FUNCTIONAL_ASSESSMENT: 0-10

## 2024-03-16 ASSESSMENT — PAIN DESCRIPTION - LOCATION: LOCATION: CHEST

## 2024-03-16 NOTE — ED PROVIDER NOTES
[facility] EMERGENCY DEPARTMENT  EMERGENCY DEPARTMENT ENCOUNTER      Pt Name: Albino Uriostegui  MRN: [id number]  Birthdate 1965  Date of evaluation: 3/16/2024  Provider: LAKSHMI PAL DO    CHIEF COMPLAINT  Chief Complaint   Patient presents with    Chest Pain     Onset 1630 while sitting and talking to daughter, took 324 ASA and 1 nitro without relief.  Has history of open heart surgery 7 years ago and states has current blockage being followed by cards.       I have fully participated in the care of Albino Uriostegui and have had a face-to-face evaluation. I have reviewed and agree with all pertinent clinical information, and midlevel provider's history, and physical exam. I have also reviewed the labs, EKG, and imaging studies and treatment plan. I have also reviewed and agree with the medications, allergies and past medical history section for this Albino Uriostegui. I agree with the diagnosis, and I concur.    I personally saw the patient and made/approved the management plan and take responsibility for the patient management.      This patient is at risk for a communicable infection.  Therefore, personal protection equipment consisting of a mask was worn for the exam.    Past Medical History:   Diagnosis Date    CAD (coronary artery disease)     multiple TIESHA    Chest pain     Nuc GXT 8/22/12 normal.  Echo 8/21/12 LVEF 60-65%    Depression     Diabetes mellitus (HCC)     managed by PCP    Family history of early CAD     Female pelvic pain 2009    HTN (hypertension)     controlled    Hyperlipidemia     rec semi annual lipids with LDL goal <70    Hypothyroidism     managed by PCP    Rectal bleeding 12/31/2014    Tobacco user     cessation discussed       MDM:  History: Albino Uriostegui is a 58 y.o. female who presents with chest pain has a history of heart problems.  She has a history of right bundle branch block.  She has had heart surgery in the past.  This feels like her heart pain only has never 
membranes are moist.      Pharynx: No posterior oropharyngeal erythema.   Eyes:      General:         Right eye: No discharge.         Left eye: No discharge.      Conjunctiva/sclera: Conjunctivae normal.   Cardiovascular:      Rate and Rhythm: Regular rhythm. Tachycardia present.      Pulses: Normal pulses.      Heart sounds: Normal heart sounds. No murmur heard.     No gallop.   Pulmonary:      Effort: Pulmonary effort is normal. No respiratory distress.      Breath sounds: Normal breath sounds. No wheezing, rhonchi or rales.   Abdominal:      Palpations: Abdomen is soft.      Tenderness: There is no abdominal tenderness. There is no right CVA tenderness or left CVA tenderness.   Musculoskeletal:         General: No tenderness or signs of injury. Normal range of motion.      Cervical back: Normal range of motion and neck supple. No rigidity.      Comments: Minimal nonpitting edema in the bilateral ankles.  Distal pulses 2+ bilaterally in radialis as well as dorsalis pedis.  Capillary fill brisk less than 1 second throughout.   Lymphadenopathy:      Cervical: No cervical adenopathy.   Skin:     General: Skin is warm and dry.      Capillary Refill: Capillary refill takes less than 2 seconds.      Findings: No rash.   Neurological:      Mental Status: She is alert and oriented to person, place, and time. Mental status is at baseline.   Psychiatric:         Behavior: Behavior normal.         Thought Content: Thought content normal.      Comments: Anxious appearing           DIAGNOSTIC RESULTS   LABS:    Labs Reviewed   BASIC METABOLIC PANEL W/ REFLEX TO MG FOR LOW K - Abnormal; Notable for the following components:       Result Value    Glucose 306 (*)     BUN 23 (*)     All other components within normal limits   CBC WITH AUTO DIFFERENTIAL - Abnormal; Notable for the following components:    WBC 12.7 (*)     Neutrophils Absolute 8.5 (*)     All other components within normal limits   TROPONIN - Abnormal; Notable for

## 2024-03-16 NOTE — ED NOTES
Pt laying on L side in bed eyes closed, AAOx4, NAD, resp e/u on 2L NC o2, bed locked lowest position, rails up x2, call light within reach, family at bedside, pt reports pain has decreased down to a 2 out of 10 feeling better than on arrival but still present, requesting ice chips, denies any other needs at this time.

## 2024-03-17 LAB
ANION GAP SERPL CALCULATED.3IONS-SCNC: 7 MMOL/L (ref 3–16)
ANTI-XA UNFRAC HEPARIN: 0.31 IU/ML (ref 0.3–0.7)
APTT BLD: 28.4 SEC (ref 22.7–35.9)
BUN SERPL-MCNC: 22 MG/DL (ref 7–20)
CALCIUM SERPL-MCNC: 9 MG/DL (ref 8.3–10.6)
CHLORIDE SERPL-SCNC: 105 MMOL/L (ref 99–110)
CO2 SERPL-SCNC: 27 MMOL/L (ref 21–32)
CREAT SERPL-MCNC: 1.1 MG/DL (ref 0.6–1.1)
DEPRECATED RDW RBC AUTO: 13.6 % (ref 12.4–15.4)
DEPRECATED RDW RBC AUTO: 13.6 % (ref 12.4–15.4)
EKG ATRIAL RATE: 95 BPM
EKG DIAGNOSIS: NORMAL
EKG P AXIS: 68 DEGREES
EKG P-R INTERVAL: 180 MS
EKG Q-T INTERVAL: 406 MS
EKG QRS DURATION: 132 MS
EKG QTC CALCULATION (BAZETT): 510 MS
EKG R AXIS: -40 DEGREES
EKG T AXIS: 26 DEGREES
EKG VENTRICULAR RATE: 95 BPM
EST. AVERAGE GLUCOSE BLD GHB EST-MCNC: 228.8 MG/DL
GFR SERPLBLD CREATININE-BSD FMLA CKD-EPI: 58 ML/MIN/{1.73_M2}
GLUCOSE BLD-MCNC: 178 MG/DL (ref 70–99)
GLUCOSE BLD-MCNC: 209 MG/DL (ref 70–99)
GLUCOSE BLD-MCNC: 234 MG/DL (ref 70–99)
GLUCOSE BLD-MCNC: 267 MG/DL (ref 70–99)
GLUCOSE SERPL-MCNC: 174 MG/DL (ref 70–99)
HBA1C MFR BLD: 9.6 %
HCT VFR BLD AUTO: 32.5 % (ref 36–48)
HCT VFR BLD AUTO: 34.6 % (ref 36–48)
HGB BLD-MCNC: 11 G/DL (ref 12–16)
HGB BLD-MCNC: 11.7 G/DL (ref 12–16)
MCH RBC QN AUTO: 29.4 PG (ref 26–34)
MCH RBC QN AUTO: 29.5 PG (ref 26–34)
MCHC RBC AUTO-ENTMCNC: 33.7 G/DL (ref 31–36)
MCHC RBC AUTO-ENTMCNC: 34 G/DL (ref 31–36)
MCV RBC AUTO: 86.9 FL (ref 80–100)
MCV RBC AUTO: 87.1 FL (ref 80–100)
PERFORMED ON: ABNORMAL
PLATELET # BLD AUTO: 300 K/UL (ref 135–450)
PLATELET # BLD AUTO: 344 K/UL (ref 135–450)
PMV BLD AUTO: 7.6 FL (ref 5–10.5)
PMV BLD AUTO: 8.1 FL (ref 5–10.5)
POTASSIUM SERPL-SCNC: 4.3 MMOL/L (ref 3.5–5.1)
RBC # BLD AUTO: 3.74 M/UL (ref 4–5.2)
RBC # BLD AUTO: 3.97 M/UL (ref 4–5.2)
SODIUM SERPL-SCNC: 139 MMOL/L (ref 136–145)
TROPONIN, HIGH SENSITIVITY: 112 NG/L (ref 0–14)
WBC # BLD AUTO: 9.3 K/UL (ref 4–11)
WBC # BLD AUTO: 9.5 K/UL (ref 4–11)

## 2024-03-17 PROCEDURE — 36415 COLL VENOUS BLD VENIPUNCTURE: CPT

## 2024-03-17 PROCEDURE — 96372 THER/PROPH/DIAG INJ SC/IM: CPT

## 2024-03-17 PROCEDURE — 6370000000 HC RX 637 (ALT 250 FOR IP): Performed by: HOSPITALIST

## 2024-03-17 PROCEDURE — 85520 HEPARIN ASSAY: CPT

## 2024-03-17 PROCEDURE — 6370000000 HC RX 637 (ALT 250 FOR IP): Performed by: INTERNAL MEDICINE

## 2024-03-17 PROCEDURE — 85730 THROMBOPLASTIN TIME PARTIAL: CPT

## 2024-03-17 PROCEDURE — 93010 ELECTROCARDIOGRAM REPORT: CPT | Performed by: INTERNAL MEDICINE

## 2024-03-17 PROCEDURE — 6360000002 HC RX W HCPCS: Performed by: INTERNAL MEDICINE

## 2024-03-17 PROCEDURE — 2580000003 HC RX 258: Performed by: HOSPITALIST

## 2024-03-17 PROCEDURE — 96366 THER/PROPH/DIAG IV INF ADDON: CPT

## 2024-03-17 PROCEDURE — 85027 COMPLETE CBC AUTOMATED: CPT

## 2024-03-17 PROCEDURE — 80048 BASIC METABOLIC PNL TOTAL CA: CPT

## 2024-03-17 PROCEDURE — 99222 1ST HOSP IP/OBS MODERATE 55: CPT | Performed by: INTERNAL MEDICINE

## 2024-03-17 PROCEDURE — 96365 THER/PROPH/DIAG IV INF INIT: CPT

## 2024-03-17 PROCEDURE — G0378 HOSPITAL OBSERVATION PER HR: HCPCS

## 2024-03-17 PROCEDURE — 6360000002 HC RX W HCPCS: Performed by: HOSPITALIST

## 2024-03-17 PROCEDURE — 94760 N-INVAS EAR/PLS OXIMETRY 1: CPT

## 2024-03-17 PROCEDURE — 84484 ASSAY OF TROPONIN QUANT: CPT

## 2024-03-17 RX ORDER — RANOLAZINE 500 MG/1
500 TABLET, EXTENDED RELEASE ORAL 2 TIMES DAILY
Status: DISCONTINUED | OUTPATIENT
Start: 2024-03-17 | End: 2024-03-18

## 2024-03-17 RX ORDER — VILAZODONE HYDROCHLORIDE 40 MG/1
40 TABLET ORAL DAILY
Status: DISCONTINUED | OUTPATIENT
Start: 2024-03-17 | End: 2024-03-19 | Stop reason: HOSPADM

## 2024-03-17 RX ORDER — ROSUVASTATIN CALCIUM 40 MG/1
40 TABLET, COATED ORAL NIGHTLY
Status: DISCONTINUED | OUTPATIENT
Start: 2024-03-17 | End: 2024-03-19 | Stop reason: HOSPADM

## 2024-03-17 RX ORDER — HEPARIN SODIUM 1000 [USP'U]/ML
60 INJECTION, SOLUTION INTRAVENOUS; SUBCUTANEOUS PRN
Status: DISCONTINUED | OUTPATIENT
Start: 2024-03-17 | End: 2024-03-17 | Stop reason: ALTCHOICE

## 2024-03-17 RX ORDER — HEPARIN SODIUM 1000 [USP'U]/ML
30 INJECTION, SOLUTION INTRAVENOUS; SUBCUTANEOUS PRN
Status: DISCONTINUED | OUTPATIENT
Start: 2024-03-17 | End: 2024-03-17

## 2024-03-17 RX ORDER — HEPARIN SODIUM 10000 [USP'U]/100ML
0-4000 INJECTION, SOLUTION INTRAVENOUS CONTINUOUS
Status: DISCONTINUED | OUTPATIENT
Start: 2024-03-17 | End: 2024-03-18

## 2024-03-17 RX ORDER — LISINOPRIL 5 MG/1
5 TABLET ORAL NIGHTLY
Status: DISCONTINUED | OUTPATIENT
Start: 2024-03-17 | End: 2024-03-19 | Stop reason: HOSPADM

## 2024-03-17 RX ORDER — LEVOTHYROXINE SODIUM 112 UG/1
112 TABLET ORAL DAILY
Status: DISCONTINUED | OUTPATIENT
Start: 2024-03-17 | End: 2024-03-19 | Stop reason: HOSPADM

## 2024-03-17 RX ORDER — ISOSORBIDE MONONITRATE 60 MG/1
60 TABLET, EXTENDED RELEASE ORAL NIGHTLY
Status: DISCONTINUED | OUTPATIENT
Start: 2024-03-17 | End: 2024-03-19 | Stop reason: HOSPADM

## 2024-03-17 RX ORDER — LIOTHYRONINE SODIUM 25 UG/1
25 TABLET ORAL DAILY
Status: DISCONTINUED | OUTPATIENT
Start: 2024-03-17 | End: 2024-03-19 | Stop reason: HOSPADM

## 2024-03-17 RX ORDER — HEPARIN SODIUM 1000 [USP'U]/ML
4000 INJECTION, SOLUTION INTRAVENOUS; SUBCUTANEOUS ONCE
Status: COMPLETED | OUTPATIENT
Start: 2024-03-17 | End: 2024-03-17

## 2024-03-17 RX ADMIN — NITROGLYCERIN 1 INCH: 20 OINTMENT TOPICAL at 12:55

## 2024-03-17 RX ADMIN — SODIUM CHLORIDE, PRESERVATIVE FREE 10 ML: 5 INJECTION INTRAVENOUS at 20:52

## 2024-03-17 RX ADMIN — LISINOPRIL 5 MG: 5 TABLET ORAL at 20:52

## 2024-03-17 RX ADMIN — INSULIN LISPRO 7 UNITS: 100 INJECTION, SOLUTION INTRAVENOUS; SUBCUTANEOUS at 12:56

## 2024-03-17 RX ADMIN — INSULIN LISPRO 1 UNITS: 100 INJECTION, SOLUTION INTRAVENOUS; SUBCUTANEOUS at 09:21

## 2024-03-17 RX ADMIN — NITROGLYCERIN 1 INCH: 20 OINTMENT TOPICAL at 18:09

## 2024-03-17 RX ADMIN — NITROGLYCERIN 1 INCH: 20 OINTMENT TOPICAL at 06:09

## 2024-03-17 RX ADMIN — RANOLAZINE 500 MG: 500 TABLET, FILM COATED, EXTENDED RELEASE ORAL at 14:32

## 2024-03-17 RX ADMIN — NITROGLYCERIN 1 INCH: 20 OINTMENT TOPICAL at 00:04

## 2024-03-17 RX ADMIN — ENOXAPARIN SODIUM 40 MG: 100 INJECTION SUBCUTANEOUS at 09:20

## 2024-03-17 RX ADMIN — ISOSORBIDE MONONITRATE 60 MG: 60 TABLET, EXTENDED RELEASE ORAL at 20:52

## 2024-03-17 RX ADMIN — INSULIN LISPRO 7 UNITS: 100 INJECTION, SOLUTION INTRAVENOUS; SUBCUTANEOUS at 09:21

## 2024-03-17 RX ADMIN — HEPARIN SODIUM 4000 UNITS: 1000 INJECTION INTRAVENOUS; SUBCUTANEOUS at 13:04

## 2024-03-17 RX ADMIN — VILAZODONE HYDROCHLORIDE 40 MG: 40 TABLET, FILM COATED ORAL at 14:32

## 2024-03-17 RX ADMIN — SODIUM CHLORIDE, PRESERVATIVE FREE 10 ML: 5 INJECTION INTRAVENOUS at 00:07

## 2024-03-17 RX ADMIN — INSULIN LISPRO 2 UNITS: 100 INJECTION, SOLUTION INTRAVENOUS; SUBCUTANEOUS at 18:12

## 2024-03-17 RX ADMIN — METOPROLOL TARTRATE 25 MG: 25 TABLET, FILM COATED ORAL at 14:32

## 2024-03-17 RX ADMIN — SODIUM CHLORIDE, PRESERVATIVE FREE 10 ML: 5 INJECTION INTRAVENOUS at 09:21

## 2024-03-17 RX ADMIN — INSULIN GLARGINE 22 UNITS: 100 INJECTION, SOLUTION SUBCUTANEOUS at 20:52

## 2024-03-17 RX ADMIN — HEPARIN SODIUM 1000 UNITS/HR: 10000 INJECTION, SOLUTION INTRAVENOUS at 13:06

## 2024-03-17 RX ADMIN — RANOLAZINE 500 MG: 500 TABLET, FILM COATED, EXTENDED RELEASE ORAL at 20:52

## 2024-03-17 RX ADMIN — ASPIRIN 81 MG: 81 TABLET, CHEWABLE ORAL at 09:20

## 2024-03-17 RX ADMIN — METOPROLOL TARTRATE 25 MG: 25 TABLET, FILM COATED ORAL at 20:52

## 2024-03-17 RX ADMIN — INSULIN LISPRO 7 UNITS: 100 INJECTION, SOLUTION INTRAVENOUS; SUBCUTANEOUS at 18:09

## 2024-03-17 RX ADMIN — ROSUVASTATIN CALCIUM 40 MG: 40 TABLET, FILM COATED ORAL at 20:52

## 2024-03-17 ASSESSMENT — PAIN SCALES - GENERAL
PAINLEVEL_OUTOF10: 0
PAINLEVEL_OUTOF10: 0

## 2024-03-17 NOTE — H&P
TECHNOLOGIST PROVIDED HISTORY: Reason for exam:->Chest Pain Reason for Exam: Chest Pain FINDINGS: Evidence of prior CABG with median sternotomy wires and mediastinal clips. No lung infiltrate or consolidation. No pneumothorax or pleural effusion. Heart size is normal.     No acute abnormality.         Electronically signed by Matt Norris MD on 3/16/2024 at 8:29 PM

## 2024-03-17 NOTE — PLAN OF CARE
Problem: Discharge Planning  Goal: Discharge to home or other facility with appropriate resources  3/17/2024 1443 by Norah Myers RN  Outcome: Progressing     Problem: Pain  Goal: Verbalizes/displays adequate comfort level or baseline comfort level  3/17/2024 1443 by Norah Myers RN  Outcome: Progressing     Problem: Safety - Adult  Goal: Free from fall injury  3/17/2024 1443 by Norah Myers RN  Outcome: Progressing     Problem: Cardiovascular - Adult  Goal: Maintains optimal cardiac output and hemodynamic stability  Outcome: Progressing     Problem: Skin/Tissue Integrity - Adult  Goal: Skin integrity remains intact  Outcome: Progressing     Problem: Musculoskeletal - Adult  Goal: Return mobility to safest level of function  Outcome: Progressing  Goal: Return ADL status to a safe level of function  Outcome: Progressing     Problem: Metabolic/Fluid and Electrolytes - Adult  Goal: Electrolytes maintained within normal limits  Outcome: Progressing  Goal: Hemodynamic stability and optimal renal function maintained  Outcome: Progressing  Goal: Glucose maintained within prescribed range  Outcome: Progressing

## 2024-03-17 NOTE — FLOWSHEET NOTE
Pleasant with nursing staff, able to make needs known.     03/17/24 0911   Assessment   Charting Type Shift assessment   Psychosocial   Psychosocial (WDL) WDL   Neurological   Neuro (WDL) WDL   Level of Consciousness 0   Orientation Level Oriented X4   Cognition Appropriate judgement;Appropriate attention/concentration;Appropriate safety awareness;Appropriate for developmental age;Follows commands   Speech Clear   Tongue Deviation None   Gag Present   Cough Reflex Present   Swallow Screening   Is the patient unable to remain alert for testing? No   Is the patient on a modified diet (thickened liquids) due to pre-existing dysphagia? No   Is there presence of existing enteral tube feeding via the stomach or nose? No   Is there presence of head-of-bed restrictions (less than 30 degrees)? No   Is there presence of tracheotomy tube? No   Is the patient ordered nothing-by-mouth status? No   3 oz Water Swallow Screen Pass   [place] Coma Scale   Eye Opening 4   Best Verbal Response 5   Best Motor Response 6   [place] Coma Scale Score 15   NIHSS Stroke Scale   NIHSS Stroke Scale Assessed No   HEENT (Head, Ears, Eyes, Nose, & Throat)   HEENT (WDL) X   Right Eye Impaired vision;Glasses   Left Eye Impaired vision;Glasses   Respiratory   Respiratory (WDL) WDL   Respiratory Interventions H.O.B. elevated   Respiratory Pattern Regular   Respiratory Depth Normal   Respiratory Quality/Effort Unlabored   Chest Assessment Chest expansion symmetrical;Trachea midline   L Breath Sounds Clear   R Breath Sounds Clear   Level of Activity/Mobility 0   Subcutaneous Air/Crepitus None   Cardiac   Cardiac (WDL) X   Cardiac Regularity Regular   Heart Sounds S1, S2;No adventitious heart sounds   Cardiac Rhythm Sinus rhythm   Cardiac Symptoms Lightheaded   Implanted Cardiac Device (Pacemaker, ICD, PA Sensor) No   Cardiac Monitor   Cardiac/Telemetry Monitor On Portable telemetry pack applied   Telemetry Box Number [id number]   Alarm Audible Centralized

## 2024-03-17 NOTE — PLAN OF CARE
Problem: Discharge Planning  Goal: Discharge to home or other facility with appropriate resources  Outcome: Progressing     Problem: Pain  Goal: Verbalizes/displays adequate comfort level or baseline comfort level  Outcome: Progressing     Problem: Safety - Adult  Goal: Free from fall injury  Outcome: Progressing   Alert and oriented x4 Resp. Easy and even Sats. WNL on RA Lungs diminished in bases Cough and deep breathing exercises encouraged No c/o pain Up ADLIB to bathroom with steady gait Free from fall/injury Turns and repositions self

## 2024-03-18 LAB
ANTI-XA UNFRAC HEPARIN: 0.19 IU/ML (ref 0.3–0.7)
ANTI-XA UNFRAC HEPARIN: 0.26 IU/ML (ref 0.3–0.7)
GLUCOSE BLD-MCNC: 178 MG/DL (ref 70–99)
GLUCOSE BLD-MCNC: 270 MG/DL (ref 70–99)
GLUCOSE BLD-MCNC: 284 MG/DL (ref 70–99)
GLUCOSE BLD-MCNC: 299 MG/DL (ref 70–99)
PERFORMED ON: ABNORMAL

## 2024-03-18 PROCEDURE — 6360000002 HC RX W HCPCS: Performed by: INTERNAL MEDICINE

## 2024-03-18 PROCEDURE — 85520 HEPARIN ASSAY: CPT

## 2024-03-18 PROCEDURE — 6370000000 HC RX 637 (ALT 250 FOR IP): Performed by: INTERNAL MEDICINE

## 2024-03-18 PROCEDURE — 36415 COLL VENOUS BLD VENIPUNCTURE: CPT

## 2024-03-18 PROCEDURE — 6370000000 HC RX 637 (ALT 250 FOR IP): Performed by: HOSPITALIST

## 2024-03-18 PROCEDURE — 99233 SBSQ HOSP IP/OBS HIGH 50: CPT | Performed by: INTERNAL MEDICINE

## 2024-03-18 PROCEDURE — 1200000000 HC SEMI PRIVATE

## 2024-03-18 PROCEDURE — 96366 THER/PROPH/DIAG IV INF ADDON: CPT

## 2024-03-18 PROCEDURE — 94760 N-INVAS EAR/PLS OXIMETRY 1: CPT

## 2024-03-18 PROCEDURE — 2580000003 HC RX 258: Performed by: HOSPITALIST

## 2024-03-18 RX ORDER — INSULIN GLARGINE 100 [IU]/ML
25 INJECTION, SOLUTION SUBCUTANEOUS NIGHTLY
Status: DISCONTINUED | OUTPATIENT
Start: 2024-03-18 | End: 2024-03-19 | Stop reason: HOSPADM

## 2024-03-18 RX ORDER — METOPROLOL SUCCINATE 50 MG/1
50 TABLET, EXTENDED RELEASE ORAL NIGHTLY
Status: DISCONTINUED | OUTPATIENT
Start: 2024-03-18 | End: 2024-03-19 | Stop reason: HOSPADM

## 2024-03-18 RX ORDER — HEPARIN SODIUM 1000 [USP'U]/ML
2000 INJECTION, SOLUTION INTRAVENOUS; SUBCUTANEOUS ONCE
Status: COMPLETED | OUTPATIENT
Start: 2024-03-18 | End: 2024-03-18

## 2024-03-18 RX ORDER — HEPARIN SODIUM 1000 [USP'U]/ML
2000 INJECTION, SOLUTION INTRAVENOUS; SUBCUTANEOUS ONCE
Status: DISCONTINUED | OUTPATIENT
Start: 2024-03-18 | End: 2024-03-18

## 2024-03-18 RX ORDER — RANOLAZINE 500 MG/1
1000 TABLET, EXTENDED RELEASE ORAL 2 TIMES DAILY
Status: DISCONTINUED | OUTPATIENT
Start: 2024-03-18 | End: 2024-03-19 | Stop reason: HOSPADM

## 2024-03-18 RX ADMIN — ASPIRIN 81 MG: 81 TABLET, CHEWABLE ORAL at 09:15

## 2024-03-18 RX ADMIN — SODIUM CHLORIDE, PRESERVATIVE FREE 10 ML: 5 INJECTION INTRAVENOUS at 09:16

## 2024-03-18 RX ADMIN — INSULIN LISPRO 7 UNITS: 100 INJECTION, SOLUTION INTRAVENOUS; SUBCUTANEOUS at 18:48

## 2024-03-18 RX ADMIN — METOPROLOL SUCCINATE 50 MG: 50 TABLET, FILM COATED, EXTENDED RELEASE ORAL at 20:34

## 2024-03-18 RX ADMIN — RANOLAZINE 1000 MG: 500 TABLET, EXTENDED RELEASE ORAL at 14:46

## 2024-03-18 RX ADMIN — INSULIN LISPRO 2 UNITS: 100 INJECTION, SOLUTION INTRAVENOUS; SUBCUTANEOUS at 12:46

## 2024-03-18 RX ADMIN — VILAZODONE HYDROCHLORIDE 40 MG: 40 TABLET, FILM COATED ORAL at 14:46

## 2024-03-18 RX ADMIN — INSULIN LISPRO 7 UNITS: 100 INJECTION, SOLUTION INTRAVENOUS; SUBCUTANEOUS at 12:46

## 2024-03-18 RX ADMIN — HEPARIN SODIUM 2000 UNITS: 1000 INJECTION INTRAVENOUS; SUBCUTANEOUS at 03:00

## 2024-03-18 RX ADMIN — POLYETHYLENE GLYCOL 3350 17 G: 17 POWDER, FOR SOLUTION ORAL at 14:54

## 2024-03-18 RX ADMIN — SODIUM CHLORIDE, PRESERVATIVE FREE 10 ML: 5 INJECTION INTRAVENOUS at 20:34

## 2024-03-18 RX ADMIN — INSULIN GLARGINE 25 UNITS: 100 INJECTION, SOLUTION SUBCUTANEOUS at 20:35

## 2024-03-18 RX ADMIN — NITROGLYCERIN 1 INCH: 20 OINTMENT TOPICAL at 00:08

## 2024-03-18 RX ADMIN — ROSUVASTATIN CALCIUM 40 MG: 40 TABLET, FILM COATED ORAL at 20:34

## 2024-03-18 RX ADMIN — LEVOTHYROXINE SODIUM 112 MCG: 0.11 TABLET ORAL at 09:15

## 2024-03-18 RX ADMIN — RANOLAZINE 1000 MG: 500 TABLET, EXTENDED RELEASE ORAL at 20:34

## 2024-03-18 RX ADMIN — LIOTHYRONINE SODIUM 25 MCG: 25 TABLET ORAL at 09:15

## 2024-03-18 RX ADMIN — LISINOPRIL 5 MG: 5 TABLET ORAL at 20:35

## 2024-03-18 RX ADMIN — NITROGLYCERIN 1 INCH: 20 OINTMENT TOPICAL at 05:45

## 2024-03-18 RX ADMIN — INSULIN LISPRO 7 UNITS: 100 INJECTION, SOLUTION INTRAVENOUS; SUBCUTANEOUS at 09:03

## 2024-03-18 RX ADMIN — ISOSORBIDE MONONITRATE 60 MG: 60 TABLET, EXTENDED RELEASE ORAL at 20:34

## 2024-03-18 ASSESSMENT — PAIN SCALES - GENERAL
PAINLEVEL_OUTOF10: 0
PAINLEVEL_OUTOF10: 0

## 2024-03-18 NOTE — PLAN OF CARE
Problem: Pain  Goal: Verbalizes/displays adequate comfort level or baseline comfort level  3/18/2024 0131 by Peter Lazaro RN  Outcome: Progressing     Problem: Safety - Adult  Goal: Free from fall injury  3/18/2024 0131 by Peter Lazaro RN  Outcome: Progressing     Problem: Cardiovascular - Adult  Goal: Maintains optimal cardiac output and hemodynamic stability  3/18/2024 0131 by Peter Lazaro RN  Outcome: Progressing  Flowsheets (Taken 3/18/2024 0131)  Maintains optimal cardiac output and hemodynamic stability:   Monitor blood pressure and heart rate   Monitor urine output and notify Licensed Independent Practitioner for values outside of normal range   Assess for signs of decreased cardiac output     Problem: Metabolic/Fluid and Electrolytes - Adult  Goal: Electrolytes maintained within normal limits  3/18/2024 0131 by Peter Lazaro RN  Outcome: Progressing

## 2024-03-18 NOTE — DISCHARGE INSTRUCTIONS
Extra Heart Failure Education/ Tools/ Resources:     https://[employer].com/publication/?n=992151   --- this is American Heart Association interactive Healthier Living with Heart Failure guidebook.  Please click hyperlink or copy / paste link into search bar. The QR Code is also available below. Use your mouse to scroll through the pages.  Lots of information about weight monitoring, diet tips, activity, meds, etc    Heart Failure Tools and Resources QR Code is below. It includes multiple resources to include symptom tracker, med tracker, further HF info, and access to a HF Support Network online Community    HF [place] Arsenio  -- this is a free smart phone arsenio available for iPhone and Android download.  Use your phone to track sodium / fluid intake, zone tool symptom tracking, weights, medications, etc. Click on this hyperlink  [facility] [place] Arsenio   for QR code for easy download or the link is also found in the below HF Tools and Resources.      DASH (Dietary Approach to Stop Hypertension) diet --  https://www.nhlbi.nih.gov/education/dash-eating-plan -- this diet is a flexible eating plan that promotes heart healthy eating style.  Click on hyperlink or copy / paste link into search bar.  Lots of low sodium recipes and tips.    https://www.ViS.RV ID/recipes  -- more free recipes

## 2024-03-18 NOTE — FLOWSHEET NOTE
03/18/24 0901   Assessment   Charting Type Shift assessment   Psychosocial   Psychosocial (WDL) WDL   Neurological   Neuro (WDL) WDL   Level of Consciousness 0   Orientation Level Oriented X4   Cognition Appropriate judgement;Appropriate attention/concentration;Appropriate safety awareness;Appropriate for developmental age;Follows commands   Speech Clear   Tongue Deviation None   Gag Present   Cough Reflex Present   Swallow Screening   Is the patient unable to remain alert for testing? No   Is the patient on a modified diet (thickened liquids) due to pre-existing dysphagia? No   Is there presence of existing enteral tube feeding via the stomach or nose? No   Is there presence of head-of-bed restrictions (less than 30 degrees)? No   Is there presence of tracheotomy tube? No   Is the patient ordered nothing-by-mouth status? No   3 oz Water Swallow Screen Pass   Isauro Coma Scale   Eye Opening 4   Best Verbal Response 5   Best Motor Response 6   Isauro Coma Scale Score 15   NIHSS Stroke Scale   NIHSS Stroke Scale Assessed No   HEENT (Head, Ears, Eyes, Nose, & Throat)   HEENT (WDL) X   Right Eye Impaired vision;Glasses   Left Eye Impaired vision;Glasses   Respiratory   Respiratory (WDL) WDL   Respiratory Interventions H.O.B. elevated   Respiratory Pattern Regular   Respiratory Depth Normal   Respiratory Quality/Effort Unlabored   Chest Assessment Chest expansion symmetrical   L Breath Sounds Clear   R Breath Sounds Clear   Level of Activity/Mobility 0   Subcutaneous Air/Crepitus None   Cardiac   Cardiac (WDL) WDL   Cardiac Regularity Regular   Heart Sounds S1, S2;No adventitious heart sounds   Cardiac Symptoms Peripheral edema   Implanted Cardiac Device (Pacemaker, ICD, PA Sensor) No   Cardiac Monitor   Cardiac/Telemetry Monitor On Portable telemetry pack applied   Telemetry Box Number [id number]   Alarm Audible Centralized cardiac monitoring   Alarms Set Centralized cardiac monitoring   Electrodes Replaced Yes

## 2024-03-18 NOTE — CARE COORDINATION
DISCHARGE PLANNING:    Per chart review, patient has no SW/CM needs at this time.  If needs arise, please consult SW/CM.  Patient is from home independent.  Patient has a current PCP and insurance.      #[phone]  Electronically signed by Shelia Pa RN on 3/18/2024 at 10:31 AM

## 2024-03-19 VITALS
DIASTOLIC BLOOD PRESSURE: 62 MMHG | RESPIRATION RATE: 18 BRPM | OXYGEN SATURATION: 96 % | BODY MASS INDEX: 29.94 KG/M2 | SYSTOLIC BLOOD PRESSURE: 111 MMHG | HEART RATE: 74 BPM | HEIGHT: 68 IN | WEIGHT: 197.53 LBS | TEMPERATURE: 98.3 F

## 2024-03-19 PROBLEM — I50.33 ACUTE ON CHRONIC DIASTOLIC HEART FAILURE (HCC): Status: ACTIVE | Noted: 2024-03-19

## 2024-03-19 LAB
ANION GAP SERPL CALCULATED.3IONS-SCNC: 10 MMOL/L (ref 3–16)
BUN SERPL-MCNC: 20 MG/DL (ref 7–20)
CALCIUM SERPL-MCNC: 8.8 MG/DL (ref 8.3–10.6)
CHLORIDE SERPL-SCNC: 104 MMOL/L (ref 99–110)
CO2 SERPL-SCNC: 24 MMOL/L (ref 21–32)
CREAT SERPL-MCNC: 1.1 MG/DL (ref 0.6–1.1)
DEPRECATED RDW RBC AUTO: 13.8 % (ref 12.4–15.4)
GFR SERPLBLD CREATININE-BSD FMLA CKD-EPI: 58 ML/MIN/{1.73_M2}
GLUCOSE BLD-MCNC: 166 MG/DL (ref 70–99)
GLUCOSE BLD-MCNC: 362 MG/DL (ref 70–99)
GLUCOSE SERPL-MCNC: 253 MG/DL (ref 70–99)
HCT VFR BLD AUTO: 32.9 % (ref 36–48)
HGB BLD-MCNC: 11.2 G/DL (ref 12–16)
MCH RBC QN AUTO: 29.7 PG (ref 26–34)
MCHC RBC AUTO-ENTMCNC: 34 G/DL (ref 31–36)
MCV RBC AUTO: 87.2 FL (ref 80–100)
NT-PROBNP SERPL-MCNC: 368 PG/ML (ref 0–124)
PERFORMED ON: ABNORMAL
PERFORMED ON: ABNORMAL
PLATELET # BLD AUTO: 306 K/UL (ref 135–450)
PMV BLD AUTO: 8.2 FL (ref 5–10.5)
POTASSIUM SERPL-SCNC: 4.3 MMOL/L (ref 3.5–5.1)
RBC # BLD AUTO: 3.78 M/UL (ref 4–5.2)
SODIUM SERPL-SCNC: 138 MMOL/L (ref 136–145)
WBC # BLD AUTO: 8.3 K/UL (ref 4–11)

## 2024-03-19 PROCEDURE — 6370000000 HC RX 637 (ALT 250 FOR IP): Performed by: INTERNAL MEDICINE

## 2024-03-19 PROCEDURE — 2580000003 HC RX 258: Performed by: HOSPITALIST

## 2024-03-19 PROCEDURE — 36415 COLL VENOUS BLD VENIPUNCTURE: CPT

## 2024-03-19 PROCEDURE — 99233 SBSQ HOSP IP/OBS HIGH 50: CPT | Performed by: INTERNAL MEDICINE

## 2024-03-19 PROCEDURE — 83880 ASSAY OF NATRIURETIC PEPTIDE: CPT

## 2024-03-19 PROCEDURE — 85027 COMPLETE CBC AUTOMATED: CPT

## 2024-03-19 PROCEDURE — 94760 N-INVAS EAR/PLS OXIMETRY 1: CPT

## 2024-03-19 PROCEDURE — 6360000004 HC RX CONTRAST MEDICATION: Performed by: INTERNAL MEDICINE

## 2024-03-19 PROCEDURE — C8929 TTE W OR WO FOL WCON,DOPPLER: HCPCS

## 2024-03-19 PROCEDURE — 80048 BASIC METABOLIC PNL TOTAL CA: CPT

## 2024-03-19 PROCEDURE — 6370000000 HC RX 637 (ALT 250 FOR IP): Performed by: HOSPITALIST

## 2024-03-19 RX ORDER — CLOPIDOGREL BISULFATE 75 MG/1
75 TABLET ORAL DAILY
Qty: 30 TABLET | Refills: 3 | Status: SHIPPED | OUTPATIENT
Start: 2024-03-19

## 2024-03-19 RX ORDER — METOPROLOL SUCCINATE 50 MG/1
50 TABLET, EXTENDED RELEASE ORAL NIGHTLY
Qty: 30 TABLET | Refills: 3 | Status: SHIPPED | OUTPATIENT
Start: 2024-03-19

## 2024-03-19 RX ORDER — RANOLAZINE 1000 MG/1
1000 TABLET, EXTENDED RELEASE ORAL 2 TIMES DAILY
Qty: 60 TABLET | Refills: 3 | Status: SHIPPED | OUTPATIENT
Start: 2024-03-19

## 2024-03-19 RX ADMIN — LEVOTHYROXINE SODIUM 112 MCG: 0.11 TABLET ORAL at 09:06

## 2024-03-19 RX ADMIN — PERFLUTREN 1.5 ML: 6.52 INJECTION, SUSPENSION INTRAVENOUS at 13:00

## 2024-03-19 RX ADMIN — INSULIN LISPRO 7 UNITS: 100 INJECTION, SOLUTION INTRAVENOUS; SUBCUTANEOUS at 14:29

## 2024-03-19 RX ADMIN — LIOTHYRONINE SODIUM 25 MCG: 25 TABLET ORAL at 09:09

## 2024-03-19 RX ADMIN — ASPIRIN 81 MG: 81 TABLET, CHEWABLE ORAL at 09:06

## 2024-03-19 RX ADMIN — RANOLAZINE 1000 MG: 500 TABLET, EXTENDED RELEASE ORAL at 09:06

## 2024-03-19 RX ADMIN — INSULIN LISPRO 7 UNITS: 100 INJECTION, SOLUTION INTRAVENOUS; SUBCUTANEOUS at 09:08

## 2024-03-19 RX ADMIN — EMPAGLIFLOZIN 10 MG: 10 TABLET, FILM COATED ORAL at 09:06

## 2024-03-19 RX ADMIN — VILAZODONE HYDROCHLORIDE 40 MG: 40 TABLET, FILM COATED ORAL at 15:41

## 2024-03-19 RX ADMIN — INSULIN LISPRO 4 UNITS: 100 INJECTION, SOLUTION INTRAVENOUS; SUBCUTANEOUS at 09:08

## 2024-03-19 RX ADMIN — SODIUM CHLORIDE, PRESERVATIVE FREE 10 ML: 5 INJECTION INTRAVENOUS at 09:15

## 2024-03-19 NOTE — PLAN OF CARE
Problem: Safety - Adult  Goal: Free from fall injury  Outcome: Progressing     Problem: Cardiovascular - Adult  Goal: Maintains optimal cardiac output and hemodynamic stability  Outcome: Progressing     Problem: Metabolic/Fluid and Electrolytes - Adult  Goal: Electrolytes maintained within normal limits  Outcome: Progressing

## 2024-03-19 NOTE — NURSE NAVIGATOR
DC order noted. Pt with NSTEMI, acute on chronic diastolic HF. HF dc instructions are on AVS. Pt declines 7 day appt as she already has an appt in place for 10 days to see her specific Cardiologist at [place] location which is closer to where she lives. Bedside RN updated and aware of appt on 3/29 at 2:30 card f/u appt with Dr Burton in [place].

## 2024-03-19 NOTE — PLAN OF CARE
Problem: Discharge Planning  Goal: Discharge to home or other facility with appropriate resources  3/19/2024 1736 by Umm Perkins RN  Outcome: Completed     Problem: Pain  Goal: Verbalizes/displays adequate comfort level or baseline comfort level  3/19/2024 1736 by Umm Perkins RN  Outcome: Completed     Problem: Safety - Adult  Goal: Free from fall injury  3/19/2024 1736 by Umm Perkins RN  Outcome: Completed     Problem: Cardiovascular - Adult  Goal: Maintains optimal cardiac output and hemodynamic stability  3/19/2024 1736 by Umm Perkins RN  Outcome: Completed     Problem: Skin/Tissue Integrity - Adult  Goal: Skin integrity remains intact  3/19/2024 1736 by Umm Perkins RN  Outcome: Completed     Problem: Musculoskeletal - Adult  Goal: Return mobility to safest level of function  3/19/2024 1736 by Umm Perkins RN  Outcome: Completed     Problem: Musculoskeletal - Adult  Goal: Return ADL status to a safe level of function  3/19/2024 1736 by Umm Perkins RN  Outcome: Completed     Problem: Metabolic/Fluid and Electrolytes - Adult  Goal: Electrolytes maintained within normal limits  3/19/2024 1736 by Umm Perkins RN  Outcome: Completed     Problem: Metabolic/Fluid and Electrolytes - Adult  Goal: Hemodynamic stability and optimal renal function maintained  3/19/2024 1736 by Umm Perkins RN  Outcome: Completed     Problem: Metabolic/Fluid and Electrolytes - Adult  Goal: Glucose maintained within prescribed range  3/19/2024 1736 by Umm Perkins RN  Outcome: Completed

## 2024-03-19 NOTE — PROGRESS NOTES
[facility] [place]   Daily Progress Note      Admit Date:  3/16/2024    CC: \"    .     Albino Uriostegui is a 58 y.o. F h/o CAD s/p multiple PCI (6 stents 6-7 y ago, recent PCI 1/2024), DMT2, HTN presents with L sided chest pressure (like someone sitting on her chest) starting DOA. Radiation down her L axilla and L forearm. A/w some dyspnea. Started when sitting down and talking. Upon workup in ED, CT chest unremarkalbe, CXR unremarkable. Elizabeth slightly elevated at 28, 112. . EKG showed sinus 91 bpm with RBBB without ST-T wave changes nor pathologic Q waves. When given morphine, SLNTG and NG paste, symptom completely abated.     Interval history 3/19/2024  Denies any further chest pain tightness shortness of breath  -Breathing comfortably on room air  -Awaiting echocardiogram    /62   Pulse 78   Temp 98.3 °F (36.8 °C) (Oral)   Resp 18   Ht 1.727 m (5' 8\")   Wt 89.6 kg (197 lb 8.5 oz)   SpO2 96%   BMI 30.03 kg/m²     Intake/Output Summary (Last 24 hours) at 3/19/2024 0909  Last data filed at 3/19/2024 0356  Gross per 24 hour   Intake 840 ml   Output --   Net 840 ml     Wt Readings from Last 3 Encounters:   03/19/24 89.6 kg (197 lb 8.5 oz)   03/08/24 89.8 kg (198 lb)   02/02/24 88.1 kg (194 lb 3.6 oz)     Telemetry:nsr    Physical Exam:  General:  NAD, Awake, alert and oriented X4  Skin:  Warm and dry  Neck:  Supple, no JVP appreciated, no bruit  Chest:  Clear to auscultation, no wheezes/rhonchi/rales  Cardiovascular:  Regular rate. S1S2  Abdomen:  Soft, nontender, +bowel sounds  Extremities:  No LE edema    Cardiac Diagnosis:  diabetes, hypertension, hyperlipidemia, coronary artery disease, and status post CABG    Medications:    ranolazine  1,000 mg Oral BID    metoprolol succinate  50 mg Oral Nightly    empagliflozin  10 mg Oral Daily    insulin glargine  25 Units SubCUTAneous Nightly    levothyroxine  112 mcg Oral Daily    isosorbide mononitrate  60 mg Oral Nightly    liothyronine  25 mcg 
    V2.0    [facility] Progress Note      Name:  Albino Uriostegui /Age/Sex: 1965  (58 y.o. female)   MRN & CSN:  [id number] & [id number] Encounter Date/Time: 3/17/2024 12:16 PM EDT   Location:  [id number] PCP: Jade Unger APRN     Attending:Kyle Varela MD       Hospital Day: 2    Assessment and Recommendations   Albino Uriostegui is a 58 y.o. female with a pmh of with pmh of CABG status post 6 stents in about 6 to 7 years ago and about 8 stents; and for recent PCI in 2024 who presents emergency department with Chest pain not amenable to PCI       Plan:   NSTEMI  D.w cardiology   Heparin drip, cath planned for tomorrow n.p.o. after midnight  Chest pain with h/o CAD   Patient has has uptrending troponin  Cardiology recommendation noticed and then since having uptrending troponin will notify cardiology to see if recommend any further investigations  Aspirin 81 Lipitor 80 Imdur 60 mg, lisinopril 5 mg, Lopressor 12.5 mg twice daily, Ranexa 500 mg twice daily    Diabetes mellitus  On Lantus and insulin sliding scale    Hypothyroidism on Synthroid and Cytomel        Diet ADULT DIET; Regular; 5 carb choices (75 gm/meal); Low Fat/Low Chol/High Fiber/SALINA; No Caffeine   DVT Prophylaxis [] Lovenox, []  Heparin, [] SCDs, [] Ambulation,  [] Eliquis, [] Xarelto  [] Coumadin   Code Status Full Code   Disposition From: home  Expected Disposition: home  Estimated Date of Discharge: unclear   Patient requires continued admission due to chest pain   Surrogate Decision Maker/ POA       Personally reviewed Lab Studies and Imaging     Discussed management of the case with cardiology who recommended repeat troponin    EKG interpreted personally and results Rbbb with NSR        Drugs that require monitoring for toxicity include lovenox and the method of monitoring was cbc        Subjective:     Chief Complaint: Chest pain     Albino Uriostegui is a 58 y.o. female who presents with chest pain  Mention chest pain got 
4 Eyes Skin Assessment     NAME:  Albino Uriostegui  YOB: 1965  MEDICAL RECORD NUMBER:  [id number]    The patient is being assessed for  Admission    I agree that at least one RN has performed a thorough Head to Toe Skin Assessment on the patient. ALL assessment sites listed below have been assessed.      Areas assessed by both nurses:    Head, Face, Ears, Shoulders, Back, Chest, Arms, Elbows, Hands, Sacrum. Buttock, Coccyx, Ischium, Legs. Feet and Heels, and Under Medical Devices         Does the Patient have a Wound? No noted wound(s)       Tobias Prevention initiated by RN: No  Wound Care Orders initiated by RN: No    Pressure Injury (Stage 3,4, Unstageable, DTI, NWPT, and Complex wounds) if present, place Wound referral order by RN under : No    New Ostomies, if present place, Ostomy referral order under : No     Nurse 1 eSignature: Electronically signed by Leena Manuel RN on 3/16/24 at 11:20 PM EDT    **SHARE this note so that the co-signing nurse can place an eSignature**    Nurse 2 eSignature: Electronically signed by Ursula Reese RN on 3/16/24 at 11:23 PM EDT   
Clinical Pharmacy Note  Heparin Dosing       Lab Results   Component Value Date/Time    ANTIXAUHEP 0.19 03/18/2024 02:12 AM      Lab Results   Component Value Date/Time    HGB 11.7 03/17/2024 12:56 PM    HCT 34.6 03/17/2024 12:56 PM     03/17/2024 12:56 PM    INR 1.19 11/08/2017 05:00 AM       Current Infusion Rate: 1000 units/hr    Plan:  Bolus: 2000 units  Rate: 1180 units/hr  Next anti-Xa level: 1000 03/18/24    Pharmacy will continue to monitor and adjust based on anti-Xa results.    Niurka Cummins, BassamD    
Clinical Pharmacy Note  Heparin Dosing       Lab Results   Component Value Date/Time    ANTIXAUHEP 0.26 03/18/2024 09:31 AM      Lab Results   Component Value Date/Time    HGB 11.7 03/17/2024 12:56 PM    HCT 34.6 03/17/2024 12:56 PM     03/17/2024 12:56 PM    INR 1.19 11/08/2017 05:00 AM       Current Infusion Rate: 1180 units/hr    Plan:  Bolus: 2000 units  Rate: increase to 1360 units/hr  Next anti-Xa level: 1700 on 03/18/24    Pharmacy will continue to monitor and adjust based on anti-Xa results.    Shira Sherman, PharmD, BCPS  3/18/2024 10:24 AM    
Clinical Pharmacy Note  Heparin Dosing       Lab Results   Component Value Date/Time    ANTIXAUHEP 0.31 03/17/2024 07:32 PM      Lab Results   Component Value Date/Time    HGB 11.7 03/17/2024 12:56 PM    HCT 34.6 03/17/2024 12:56 PM     03/17/2024 12:56 PM    INR 1.19 11/08/2017 05:00 AM       Current Infusion Rate: 1000 units/hr    Plan:  Rate: continue 1000 units/hr  Next anti-Xa level: 03/18/24 0200    Pharmacy will continue to monitor and adjust based on anti-Xa results.  Cheryl Romeo RPH, PharmD 3/17/2024 7:56 PM    
Discharge orders acknowledged by RN . Discharge teaching completed with pt and family. AVS reviewed and all questions answered. Medication regimen reviewed and pt understands schedule. Follow up appointments also reviewed with pt and resources given for discharge. Pt was sent electronic to be filled and understands schedule. IV removed. Bedside monitor removed from pt. 60+ minutes of education completed. Required core measures completed. Pt vitals WDL. Pt discharged with all belongings to home with family. Pt transported off of unit via wheelchair. No complications.     Electronically signed by Umm Perkins RN on 3/19/2024 at 5:37 PM    
Patient brought to room from ED Heart monitor applied Oriented to room and call light States understanding  
Pharmacy Medication Reconciliation Note     List of medications patient is currently taking is complete.    Source of information:   1. Rx disp history  2. Discharge instructions from Jan 2024 [facility] admission  3. MD office visit    Notes regarding home medications:   Tresiba dose now 28 units at bedtime      Denies taking any other OTC or herbal medications    Luis E Sherman RPH, JOSE 3/17/2024 10:09 AM      
Pt requesting home meds be restarted. RN sent PS to hospitalist Dr Varela. Awaiting response.    Electronically signed by Norah Myers RN on 3/17/2024 at 10:13 AM    
Pt was scheduled RHC this morning with Dr Kamara. Dr Kamara came to bedside to discuss POC with patient and family and informed them of not needing procedure done today, and stopped heparin gtt. Dr Kamara educated pt on reasons why procedure not necessary today, patient expressing gratitude for the education. Dr Kamara stated she will return to bedside today and explain POC going forward.    Electronically signed by Norah Myers RN on 3/18/2024 at 10:57 AM    
RN called on-call cardio to notify of pt troponin result elevated. Left message with call center.    Electronically signed by Norah Myers RN on 3/17/2024 at 12:35 PM    
ECG  Normal sinus rhythm  Possible Left atrial enlargement  Left axis deviation  Right bundle branch block  Minimal voltage criteria for LVH, may be normal variant     1/31/2024 CATH  HEMODYNAMICS:  LVEDP 12.  There was no gradient between the left ventricle and aorta.          ANGIOGRAM/CORONARY ARTERIOGRAM:           Right or Left dominant system     1.  The left main coronary artery arising normal fashion from the left coronary cusp giving rise to the left anterior descending artery and the left circumflex artery.  There is MIKE 3 flow.  60% STENOSIS      2.  The right coronary artery arises from right coronary ostium in normal fashion.  The RCA gives rise to RV marginal branch and terminates into a posterior descending artery and posterior lateral branch. There is MIKE 3 flow.  100% STENOSIS     3.  The LAD arises in normal fashion from left coronary giving rise to diagonals and septal branches. There is MIKE 3 flow.  99% STENOSIS     4.  The circumflex was given rise to several OMs and PDA and had luminal irregularities. There is MIKE 3 flow.                100% STENOSIS     LIMA to LAD patent  SVG to OM patent  SVF to right PDA patent    1/2024 SPECT MPI   Summary   Normal LV size and systolic function.   There is a medium sized, mild to moderate intensity, reversible defect in   the anteroapical/inferoapical walls suggestive of myocardium at risk.   Overall, findings represent an intermediate risk scan.      Recommendation   Recommend cardiac catheterization depending on clinical appropriateness.    2017 TTE   Left ventricle size is normal.   Normal left ventricular wall thickness.   Left ventricular function is normal with ejection fraction estimated at   50-55 %.   Diastolic dysfunction.   Mild thickening of anterior leaflet of mitral valve.   Trivial tricuspid regurgitation.     Medications:  levothyroxine (SYNTHROID) tablet 112 mcg, Daily  isosorbide mononitrate (IMDUR) extended release tablet 60 mg, 
replacement, 40 mEq, PRN   Or  potassium chloride, 10 mEq, PRN  magnesium sulfate, 2,000 mg, PRN  ondansetron, 4 mg, Q8H PRN   Or  ondansetron, 4 mg, Q6H PRN  acetaminophen, 650 mg, Q6H PRN   Or  acetaminophen, 650 mg, Q6H PRN  polyethylene glycol, 17 g, Daily PRN  nitroGLYCERIN, 0.4 mg, Q5 Min PRN  glucose, 4 tablet, PRN  dextrose bolus, 125 mL, PRN   Or  dextrose bolus, 250 mL, PRN  glucagon (rDNA), 1 mg, PRN  dextrose, , Continuous PRN        Labs and Imaging   XR CHEST PORTABLE    Result Date: 3/16/2024  EXAMINATION: ONE XRAY VIEW OF THE CHEST 3/16/2024 6:29 pm COMPARISON: 01/29/2024 HISTORY: ORDERING SYSTEM PROVIDED HISTORY: Chest Pain TECHNOLOGIST PROVIDED HISTORY: Reason for exam:->Chest Pain Reason for Exam: Chest Pain FINDINGS: Evidence of prior CABG with median sternotomy wires and mediastinal clips. No lung infiltrate or consolidation. No pneumothorax or pleural effusion. Heart size is normal.     No acute abnormality.       CBC:   Recent Labs     03/16/24  1840 03/17/24  0603 03/17/24  1256   WBC 12.7* 9.5 9.3   HGB 12.4 11.0* 11.7*    300 344     BMP:    Recent Labs     03/16/24  1840 03/17/24  0603    139   K 4.5 4.3    105   CO2 24 27   BUN 23* 22*   CREATININE 1.0 1.1   GLUCOSE 306* 174*     Hepatic: No results for input(s): \"AST\", \"ALT\", \"ALB\", \"BILITOT\", \"ALKPHOS\" in the last 72 hours.  Lipids:   Lab Results   Component Value Date/Time    CHOL 233 10/26/2017 05:44 AM    HDL 34 10/26/2017 05:44 AM    TRIG 305 10/26/2017 05:44 AM     Hemoglobin A1C:   Lab Results   Component Value Date/Time    LABA1C 9.6 03/16/2024 09:58 PM     TSH: No results found for: \"TSH\"  Troponin: No results found for: \"TROPONINT\"  Lactic Acid: No results for input(s): \"LACTA\" in the last 72 hours.  BNP:   Recent Labs     03/16/24  1840   PROBNP 936*     UA:  Lab Results   Component Value Date/Time    NITRU Negative 11/06/2017 04:47 PM    COLORU DK YELLOW 11/06/2017 04:47 PM    PHUR 6.0 11/06/2017 04:47 PM

## 2024-03-19 NOTE — PLAN OF CARE
Problem: Discharge Planning  Goal: Discharge to home or other facility with appropriate resources  Outcome: Progressing     Problem: Pain  Goal: Verbalizes/displays adequate comfort level or baseline comfort level  Outcome: Progressing     Problem: Safety - Adult  Goal: Free from fall injury  3/19/2024 1136 by Umm Perkins RN  Outcome: Progressing     Problem: Cardiovascular - Adult  Goal: Maintains optimal cardiac output and hemodynamic stability  3/19/2024 1136 by Umm Perkins RN  Outcome: Progressing     Problem: Skin/Tissue Integrity - Adult  Goal: Skin integrity remains intact  Outcome: Progressing     Problem: Musculoskeletal - Adult  Goal: Return mobility to safest level of function  Outcome: Progressing     Problem: Musculoskeletal - Adult  Goal: Return ADL status to a safe level of function  Outcome: Progressing     Problem: Metabolic/Fluid and Electrolytes - Adult  Goal: Electrolytes maintained within normal limits  3/19/2024 1136 by Umm Perkins RN  Outcome: Progressing     Problem: Metabolic/Fluid and Electrolytes - Adult  Goal: Hemodynamic stability and optimal renal function maintained  Outcome: Progressing     Problem: Metabolic/Fluid and Electrolytes - Adult  Goal: Glucose maintained within prescribed range  Outcome: Progressing

## 2024-03-21 NOTE — DISCHARGE SUMMARY
mononitrate 60 MG extended release tablet  Commonly known as: IMDUR  Take 1 tablet by mouth daily  What changed: when to take this     lisinopril 5 MG tablet  Commonly known as: PRINIVIL;ZESTRIL  Take 1 tablet by mouth daily  What changed: when to take this     ranolazine 1000 MG extended release tablet  Commonly known as: RANEXA  Take 1 tablet by mouth 2 times daily  What changed:   medication strength  how much to take     rosuvastatin 40 MG tablet  Commonly known as: CRESTOR  Take 1 tablet by mouth daily  What changed: when to take this            CONTINUE taking these medications      Dulaglutide 0.75 MG/0.5ML Sopn     Euthyrox 112 MCG tablet  Generic drug: levothyroxine     liothyronine 25 MCG tablet  Commonly known as: CYTOMEL     nitroGLYCERIN 0.4 MG SL tablet  Commonly known as: Nitrostat  Place 1 tablet under the tongue every 5 minutes as needed for Chest pain up to max of 3 total doses. If no relief after 1 dose, call 911.     NovoLOG FlexPen 100 UNIT/ML injection pen  Generic drug: insulin aspart     Tresiba FlexTouch 200 UNIT/ML Sopn  Generic drug: Insulin Degludec     vilazodone hcl 40 MG Tabs  Generic drug: vilazodone HCl     vitamin D 1.25 MG (26205 UT) Caps capsule  Commonly known as: ERGOCALCIFEROL            STOP taking these medications      metoprolol tartrate 25 MG tablet  Commonly known as: LOPRESSOR     pioglitazone 30 MG tablet  Commonly known as: ACTOS               Where to Get Your Medications        These medications were sent to [facility] [place] - [address] - P [phone] - F [phone]  [address], [place] [address]      Phone: [phone]   clopidogrel 75 MG tablet  empagliflozin 10 MG tablet  metoprolol succinate 50 MG extended release tablet  ranolazine 1000 MG extended release tablet            Time Spent on discharge is more than 30 min in the examination, evaluation, counseling and review of medications and discharge

## 2024-03-27 NOTE — PROGRESS NOTES
plan follow-up in 2  months    Complexity of medical decision making-high  Total visit time > 35 minutes; > 50% spend counseling / coordinating care. I reviewed interval history, physical exam, review of data including labs, imaging, development and implementation of treatment plan and coordination of complex care. Counseled on risk factor modifications.    Thank you very much for allowing me to participate in the care of your patient. Please do not hesitate to contact me if you have any questions.    Sincerely,  Alcides Sy MD      [facility] [place]  [address], [address]   [place] [address]  Ph: [phone]  Fax: [phone]

## 2024-03-29 ENCOUNTER — OFFICE VISIT (OUTPATIENT)
Dept: CARDIOLOGY CLINIC | Age: 59
End: 2024-03-29
Payer: COMMERCIAL

## 2024-03-29 VITALS
SYSTOLIC BLOOD PRESSURE: 104 MMHG | OXYGEN SATURATION: 99 % | WEIGHT: 192 LBS | BODY MASS INDEX: 29.1 KG/M2 | DIASTOLIC BLOOD PRESSURE: 64 MMHG | HEART RATE: 84 BPM | HEIGHT: 68 IN

## 2024-03-29 DIAGNOSIS — E11.69 TYPE 2 DIABETES MELLITUS WITH OTHER SPECIFIED COMPLICATION, WITHOUT LONG-TERM CURRENT USE OF INSULIN (HCC): ICD-10-CM

## 2024-03-29 DIAGNOSIS — E78.5 HYPERLIPIDEMIA, UNSPECIFIED HYPERLIPIDEMIA TYPE: ICD-10-CM

## 2024-03-29 DIAGNOSIS — I10 ESSENTIAL HYPERTENSION: ICD-10-CM

## 2024-03-29 DIAGNOSIS — Z95.1 S/P CABG (CORONARY ARTERY BYPASS GRAFT): ICD-10-CM

## 2024-03-29 DIAGNOSIS — I25.810 CORONARY ARTERY DISEASE INVOLVING CORONARY BYPASS GRAFT OF NATIVE HEART WITHOUT ANGINA PECTORIS: Primary | ICD-10-CM

## 2024-03-29 PROCEDURE — 3078F DIAST BP <80 MM HG: CPT | Performed by: INTERNAL MEDICINE

## 2024-03-29 PROCEDURE — 3046F HEMOGLOBIN A1C LEVEL >9.0%: CPT | Performed by: INTERNAL MEDICINE

## 2024-03-29 PROCEDURE — 99214 OFFICE O/P EST MOD 30 MIN: CPT | Performed by: INTERNAL MEDICINE

## 2024-03-29 PROCEDURE — 3074F SYST BP LT 130 MM HG: CPT | Performed by: INTERNAL MEDICINE

## 2024-03-29 RX ORDER — ISOSORBIDE MONONITRATE 60 MG/1
30 TABLET, EXTENDED RELEASE ORAL DAILY
Qty: 30 TABLET | Refills: 3 | Status: SHIPPED | OUTPATIENT
Start: 2024-03-29

## 2024-04-02 ENCOUNTER — HOSPITAL ENCOUNTER (OUTPATIENT)
Dept: VASCULAR LAB | Age: 59
Discharge: HOME | DRG: 254 | End: 2024-04-02
Payer: COMMERCIAL

## 2024-04-02 ENCOUNTER — APPOINTMENT (OUTPATIENT)
Dept: CARDIAC CATH/INVASIVE PROCEDURES | Age: 59
DRG: 253 | End: 2024-04-02
Attending: STUDENT IN AN ORGANIZED HEALTH CARE EDUCATION/TRAINING PROGRAM
Payer: COMMERCIAL

## 2024-04-02 ENCOUNTER — HOSPITAL ENCOUNTER (INPATIENT)
Age: 59
LOS: 1 days | Discharge: HOME | DRG: 253 | End: 2024-04-03
Attending: STUDENT IN AN ORGANIZED HEALTH CARE EDUCATION/TRAINING PROGRAM | Admitting: STUDENT IN AN ORGANIZED HEALTH CARE EDUCATION/TRAINING PROGRAM
Payer: COMMERCIAL

## 2024-04-02 DIAGNOSIS — E78.5 HYPERLIPIDEMIA, UNSPECIFIED HYPERLIPIDEMIA TYPE: ICD-10-CM

## 2024-04-02 DIAGNOSIS — I73.9 CLAUDICATION (HCC): ICD-10-CM

## 2024-04-02 DIAGNOSIS — I25.810 CORONARY ARTERY DISEASE INVOLVING CORONARY BYPASS GRAFT OF NATIVE HEART WITHOUT ANGINA PECTORIS: ICD-10-CM

## 2024-04-02 DIAGNOSIS — E11.69 TYPE 2 DIABETES MELLITUS WITH OTHER SPECIFIED COMPLICATION, WITHOUT LONG-TERM CURRENT USE OF INSULIN (HCC): ICD-10-CM

## 2024-04-02 PROBLEM — I99.8 ISCHEMIC FOOT: Status: ACTIVE | Noted: 2024-04-02

## 2024-04-02 LAB
CHOLEST SERPL-MCNC: 125 MG/DL (ref 0–199)
GLUCOSE BLD-MCNC: 377 MG/DL (ref 70–99)
HDLC SERPL-MCNC: 40 MG/DL (ref 40–60)
LDL CHOLESTEROL CALCULATED: 50 MG/DL
PERFORMED ON: ABNORMAL
POC ACT LR: 271 SEC
TRIGL SERPL-MCNC: 175 MG/DL (ref 0–150)
VLDLC SERPL CALC-MCNC: 35 MG/DL

## 2024-04-02 PROCEDURE — G0378 HOSPITAL OBSERVATION PER HR: HCPCS

## 2024-04-02 PROCEDURE — 75716 ARTERY X-RAYS ARMS/LEGS: CPT | Performed by: STUDENT IN AN ORGANIZED HEALTH CARE EDUCATION/TRAINING PROGRAM

## 2024-04-02 PROCEDURE — 047N3ZZ DILATION OF LEFT POPLITEAL ARTERY, PERCUTANEOUS APPROACH: ICD-10-PCS | Performed by: STUDENT IN AN ORGANIZED HEALTH CARE EDUCATION/TRAINING PROGRAM

## 2024-04-02 PROCEDURE — 047L3Z1 DILATION OF LEFT FEMORAL ARTERY USING DRUG-COATED BALLOON, PERCUTANEOUS APPROACH: ICD-10-PCS | Performed by: STUDENT IN AN ORGANIZED HEALTH CARE EDUCATION/TRAINING PROGRAM

## 2024-04-02 PROCEDURE — 75625 CONTRAST EXAM ABDOMINL AORTA: CPT

## 2024-04-02 PROCEDURE — 047S3ZZ DILATION OF LEFT POSTERIOR TIBIAL ARTERY, PERCUTANEOUS APPROACH: ICD-10-PCS | Performed by: STUDENT IN AN ORGANIZED HEALTH CARE EDUCATION/TRAINING PROGRAM

## 2024-04-02 PROCEDURE — 37228 PR REVSC OPN/PRQ TIB/PERO W/ANGIOPLASTY UNI: CPT | Performed by: STUDENT IN AN ORGANIZED HEALTH CARE EDUCATION/TRAINING PROGRAM

## 2024-04-02 PROCEDURE — 37228 HC TIB PER TERRITORY PLASTY: CPT

## 2024-04-02 PROCEDURE — 2500000003 HC RX 250 WO HCPCS

## 2024-04-02 PROCEDURE — C1725 CATH, TRANSLUMIN NON-LASER: HCPCS | Performed by: STUDENT IN AN ORGANIZED HEALTH CARE EDUCATION/TRAINING PROGRAM

## 2024-04-02 PROCEDURE — G0379 DIRECT REFER HOSPITAL OBSERV: HCPCS

## 2024-04-02 PROCEDURE — 99291 CRITICAL CARE FIRST HOUR: CPT | Performed by: STUDENT IN AN ORGANIZED HEALTH CARE EDUCATION/TRAINING PROGRAM

## 2024-04-02 PROCEDURE — 1200000000 HC SEMI PRIVATE

## 2024-04-02 PROCEDURE — 6370000000 HC RX 637 (ALT 250 FOR IP): Performed by: STUDENT IN AN ORGANIZED HEALTH CARE EDUCATION/TRAINING PROGRAM

## 2024-04-02 PROCEDURE — C2623 CATH, TRANSLUMIN, DRUG-COAT: HCPCS | Performed by: STUDENT IN AN ORGANIZED HEALTH CARE EDUCATION/TRAINING PROGRAM

## 2024-04-02 PROCEDURE — 75716 ARTERY X-RAYS ARMS/LEGS: CPT

## 2024-04-02 PROCEDURE — C1769 GUIDE WIRE: HCPCS | Performed by: STUDENT IN AN ORGANIZED HEALTH CARE EDUCATION/TRAINING PROGRAM

## 2024-04-02 PROCEDURE — C1760 CLOSURE DEV, VASC: HCPCS | Performed by: STUDENT IN AN ORGANIZED HEALTH CARE EDUCATION/TRAINING PROGRAM

## 2024-04-02 PROCEDURE — 99152 MOD SED SAME PHYS/QHP 5/>YRS: CPT | Performed by: STUDENT IN AN ORGANIZED HEALTH CARE EDUCATION/TRAINING PROGRAM

## 2024-04-02 PROCEDURE — 2580000003 HC RX 258: Performed by: STUDENT IN AN ORGANIZED HEALTH CARE EDUCATION/TRAINING PROGRAM

## 2024-04-02 PROCEDURE — C1887 CATHETER, GUIDING: HCPCS | Performed by: STUDENT IN AN ORGANIZED HEALTH CARE EDUCATION/TRAINING PROGRAM

## 2024-04-02 PROCEDURE — 99153 MOD SED SAME PHYS/QHP EA: CPT

## 2024-04-02 PROCEDURE — 2709999900 HC NON-CHARGEABLE SUPPLY: Performed by: STUDENT IN AN ORGANIZED HEALTH CARE EDUCATION/TRAINING PROGRAM

## 2024-04-02 PROCEDURE — 93925 LOWER EXTREMITY STUDY: CPT

## 2024-04-02 PROCEDURE — 6360000004 HC RX CONTRAST MEDICATION: Performed by: STUDENT IN AN ORGANIZED HEALTH CARE EDUCATION/TRAINING PROGRAM

## 2024-04-02 PROCEDURE — 85347 COAGULATION TIME ACTIVATED: CPT

## 2024-04-02 PROCEDURE — 75774 ARTERY X-RAY EACH VESSEL: CPT

## 2024-04-02 PROCEDURE — 37224 PR REVSC OPN/PRG FEM/POP W/ANGIOPLASTY UNI: CPT | Performed by: STUDENT IN AN ORGANIZED HEALTH CARE EDUCATION/TRAINING PROGRAM

## 2024-04-02 PROCEDURE — C1894 INTRO/SHEATH, NON-LASER: HCPCS | Performed by: STUDENT IN AN ORGANIZED HEALTH CARE EDUCATION/TRAINING PROGRAM

## 2024-04-02 PROCEDURE — B41D1ZZ FLUOROSCOPY OF AORTA AND BILATERAL LOWER EXTREMITY ARTERIES USING LOW OSMOLAR CONTRAST: ICD-10-PCS | Performed by: STUDENT IN AN ORGANIZED HEALTH CARE EDUCATION/TRAINING PROGRAM

## 2024-04-02 PROCEDURE — 37224 HC FEM POP TERRITORY PLASTY: CPT

## 2024-04-02 PROCEDURE — 6360000002 HC RX W HCPCS

## 2024-04-02 PROCEDURE — 99152 MOD SED SAME PHYS/QHP 5/>YRS: CPT

## 2024-04-02 PROCEDURE — 75625 CONTRAST EXAM ABDOMINL AORTA: CPT | Performed by: STUDENT IN AN ORGANIZED HEALTH CARE EDUCATION/TRAINING PROGRAM

## 2024-04-02 RX ORDER — SODIUM CHLORIDE 0.9 % (FLUSH) 0.9 %
5-40 SYRINGE (ML) INJECTION EVERY 12 HOURS SCHEDULED
Status: DISCONTINUED | OUTPATIENT
Start: 2024-04-02 | End: 2024-04-03 | Stop reason: HOSPADM

## 2024-04-02 RX ORDER — INSULIN LISPRO 100 [IU]/ML
0-4 INJECTION, SOLUTION INTRAVENOUS; SUBCUTANEOUS NIGHTLY
Status: DISCONTINUED | OUTPATIENT
Start: 2024-04-02 | End: 2024-04-03 | Stop reason: HOSPADM

## 2024-04-02 RX ORDER — LIOTHYRONINE SODIUM 25 UG/1
25 TABLET ORAL DAILY
Status: DISCONTINUED | OUTPATIENT
Start: 2024-04-02 | End: 2024-04-03 | Stop reason: HOSPADM

## 2024-04-02 RX ORDER — ONDANSETRON 4 MG/1
4 TABLET, ORALLY DISINTEGRATING ORAL EVERY 8 HOURS PRN
Status: DISCONTINUED | OUTPATIENT
Start: 2024-04-02 | End: 2024-04-03 | Stop reason: HOSPADM

## 2024-04-02 RX ORDER — ACETAMINOPHEN 325 MG/1
650 TABLET ORAL EVERY 4 HOURS PRN
Status: DISCONTINUED | OUTPATIENT
Start: 2024-04-02 | End: 2024-04-03 | Stop reason: HOSPADM

## 2024-04-02 RX ORDER — INSULIN GLARGINE 100 [IU]/ML
24 INJECTION, SOLUTION SUBCUTANEOUS DAILY
Status: DISCONTINUED | OUTPATIENT
Start: 2024-04-02 | End: 2024-04-03 | Stop reason: HOSPADM

## 2024-04-02 RX ORDER — VILAZODONE HYDROCHLORIDE 40 MG/1
40 TABLET ORAL
Status: DISCONTINUED | OUTPATIENT
Start: 2024-04-02 | End: 2024-04-03 | Stop reason: HOSPADM

## 2024-04-02 RX ORDER — POLYETHYLENE GLYCOL 3350 17 G/17G
17 POWDER, FOR SOLUTION ORAL DAILY PRN
Status: DISCONTINUED | OUTPATIENT
Start: 2024-04-02 | End: 2024-04-03 | Stop reason: HOSPADM

## 2024-04-02 RX ORDER — LISINOPRIL 5 MG/1
5 TABLET ORAL NIGHTLY
Status: DISCONTINUED | OUTPATIENT
Start: 2024-04-02 | End: 2024-04-03 | Stop reason: HOSPADM

## 2024-04-02 RX ORDER — POTASSIUM CHLORIDE 20 MEQ/1
40 TABLET, EXTENDED RELEASE ORAL PRN
Status: DISCONTINUED | OUTPATIENT
Start: 2024-04-02 | End: 2024-04-03 | Stop reason: HOSPADM

## 2024-04-02 RX ORDER — ASPIRIN 81 MG/1
81 TABLET ORAL DAILY
Status: DISCONTINUED | OUTPATIENT
Start: 2024-04-02 | End: 2024-04-03 | Stop reason: HOSPADM

## 2024-04-02 RX ORDER — CLOPIDOGREL BISULFATE 75 MG/1
75 TABLET ORAL DAILY
Status: DISCONTINUED | OUTPATIENT
Start: 2024-04-02 | End: 2024-04-03 | Stop reason: HOSPADM

## 2024-04-02 RX ORDER — ONDANSETRON 2 MG/ML
4 INJECTION INTRAMUSCULAR; INTRAVENOUS EVERY 6 HOURS PRN
Status: DISCONTINUED | OUTPATIENT
Start: 2024-04-02 | End: 2024-04-03 | Stop reason: HOSPADM

## 2024-04-02 RX ORDER — RANOLAZINE 500 MG/1
1000 TABLET, EXTENDED RELEASE ORAL 2 TIMES DAILY
Status: DISCONTINUED | OUTPATIENT
Start: 2024-04-02 | End: 2024-04-03 | Stop reason: HOSPADM

## 2024-04-02 RX ORDER — SODIUM CHLORIDE 0.9 % (FLUSH) 0.9 %
5-40 SYRINGE (ML) INJECTION PRN
Status: DISCONTINUED | OUTPATIENT
Start: 2024-04-02 | End: 2024-04-03 | Stop reason: HOSPADM

## 2024-04-02 RX ORDER — ACETAMINOPHEN 650 MG/1
650 SUPPOSITORY RECTAL EVERY 6 HOURS PRN
Status: DISCONTINUED | OUTPATIENT
Start: 2024-04-02 | End: 2024-04-03 | Stop reason: HOSPADM

## 2024-04-02 RX ORDER — SODIUM CHLORIDE 9 MG/ML
INJECTION, SOLUTION INTRAVENOUS PRN
Status: DISCONTINUED | OUTPATIENT
Start: 2024-04-02 | End: 2024-04-03 | Stop reason: HOSPADM

## 2024-04-02 RX ORDER — SODIUM CHLORIDE 0.9 % (FLUSH) 0.9 %
5-40 SYRINGE (ML) INJECTION PRN
Status: DISCONTINUED | OUTPATIENT
Start: 2024-04-02 | End: 2024-04-02 | Stop reason: HOSPADM

## 2024-04-02 RX ORDER — MAGNESIUM SULFATE IN WATER 40 MG/ML
2000 INJECTION, SOLUTION INTRAVENOUS PRN
Status: DISCONTINUED | OUTPATIENT
Start: 2024-04-02 | End: 2024-04-03 | Stop reason: HOSPADM

## 2024-04-02 RX ORDER — POTASSIUM CHLORIDE 7.45 MG/ML
10 INJECTION INTRAVENOUS PRN
Status: DISCONTINUED | OUTPATIENT
Start: 2024-04-02 | End: 2024-04-03 | Stop reason: HOSPADM

## 2024-04-02 RX ORDER — ACETAMINOPHEN 325 MG/1
650 TABLET ORAL EVERY 6 HOURS PRN
Status: DISCONTINUED | OUTPATIENT
Start: 2024-04-02 | End: 2024-04-03 | Stop reason: HOSPADM

## 2024-04-02 RX ORDER — SODIUM CHLORIDE 0.9 % (FLUSH) 0.9 %
5-40 SYRINGE (ML) INJECTION EVERY 12 HOURS SCHEDULED
Status: DISCONTINUED | OUTPATIENT
Start: 2024-04-02 | End: 2024-04-02 | Stop reason: HOSPADM

## 2024-04-02 RX ORDER — ENOXAPARIN SODIUM 100 MG/ML
40 INJECTION SUBCUTANEOUS DAILY
Status: DISCONTINUED | OUTPATIENT
Start: 2024-04-02 | End: 2024-04-03 | Stop reason: HOSPADM

## 2024-04-02 RX ORDER — ISOSORBIDE MONONITRATE 30 MG/1
30 TABLET, EXTENDED RELEASE ORAL DAILY
Status: DISCONTINUED | OUTPATIENT
Start: 2024-04-02 | End: 2024-04-03 | Stop reason: HOSPADM

## 2024-04-02 RX ORDER — ROSUVASTATIN CALCIUM 40 MG/1
40 TABLET, COATED ORAL NIGHTLY
Status: DISCONTINUED | OUTPATIENT
Start: 2024-04-02 | End: 2024-04-03 | Stop reason: HOSPADM

## 2024-04-02 RX ORDER — INSULIN LISPRO 100 [IU]/ML
0-4 INJECTION, SOLUTION INTRAVENOUS; SUBCUTANEOUS
Status: DISCONTINUED | OUTPATIENT
Start: 2024-04-03 | End: 2024-04-03 | Stop reason: HOSPADM

## 2024-04-02 RX ORDER — LEVOTHYROXINE SODIUM 112 UG/1
112 TABLET ORAL DAILY
Status: DISCONTINUED | OUTPATIENT
Start: 2024-04-02 | End: 2024-04-03 | Stop reason: HOSPADM

## 2024-04-02 RX ORDER — ERGOCALCIFEROL 1.25 MG/1
50000 CAPSULE ORAL
Status: DISCONTINUED | OUTPATIENT
Start: 2024-04-04 | End: 2024-04-03 | Stop reason: HOSPADM

## 2024-04-02 RX ORDER — ASPIRIN 81 MG/1
81 TABLET, CHEWABLE ORAL DAILY
Status: DISCONTINUED | OUTPATIENT
Start: 2024-04-02 | End: 2024-04-02 | Stop reason: ALTCHOICE

## 2024-04-02 RX ORDER — METOPROLOL SUCCINATE 50 MG/1
50 TABLET, EXTENDED RELEASE ORAL NIGHTLY
Status: DISCONTINUED | OUTPATIENT
Start: 2024-04-02 | End: 2024-04-03 | Stop reason: HOSPADM

## 2024-04-02 RX ADMIN — LISINOPRIL 5 MG: 5 TABLET ORAL at 20:47

## 2024-04-02 RX ADMIN — INSULIN LISPRO 4 UNITS: 100 INJECTION, SOLUTION INTRAVENOUS; SUBCUTANEOUS at 22:50

## 2024-04-02 RX ADMIN — RANOLAZINE 1000 MG: 500 TABLET, FILM COATED, EXTENDED RELEASE ORAL at 20:47

## 2024-04-02 RX ADMIN — IOPAMIDOL 195 ML: 755 INJECTION, SOLUTION INTRAVENOUS at 15:25

## 2024-04-02 RX ADMIN — ROSUVASTATIN CALCIUM 40 MG: 40 TABLET, FILM COATED ORAL at 20:47

## 2024-04-02 RX ADMIN — INSULIN GLARGINE 24 UNITS: 100 INJECTION, SOLUTION SUBCUTANEOUS at 22:08

## 2024-04-02 RX ADMIN — SODIUM CHLORIDE, PRESERVATIVE FREE 10 ML: 5 INJECTION INTRAVENOUS at 20:48

## 2024-04-02 RX ADMIN — METOPROLOL SUCCINATE 50 MG: 50 TABLET, EXTENDED RELEASE ORAL at 20:47

## 2024-04-02 ASSESSMENT — PAIN SCALES - GENERAL: PAINLEVEL_OUTOF10: 0

## 2024-04-02 NOTE — CARE COORDINATION
04/02/24 1549   Service Assessment   Patient Orientation Unable to Assess  (Pt is off the floor for a procedure.  Unable to assess x2)     JATINDER [facility]ello [facility]  [phone]  Electronically signed by JATINDER Herman on 4/2/2024 at 3:49 PM

## 2024-04-02 NOTE — PRE SEDATION
Sedation Pre-Procedure Note    Patient Name: Albino Uriostegui   YOB: 1965  Room/Bed: V5R-0194/4114-01  Medical Record Number: [id number]  Date: 4/2/2024   Time: 1:40 PM       Indication:  Jackpot IV claudication    Consent: I have discussed with the patient and/or the patient representative the indication, alternatives, and the possible risks and/or complications of the planned procedure and the anesthesia methods. The patient and/or patient representative appear to understand and agree to proceed.    Vital Signs:   There were no vitals filed for this visit.    Past Medical History:   has a past medical history of CAD (coronary artery disease), Chest pain, Depression, Diabetes mellitus (HCC), Family history of early CAD, Female pelvic pain, HTN (hypertension), Hyperlipidemia, Hypothyroidism, Rectal bleeding, and Tobacco user.    Past Surgical History:   has a past surgical history that includes Cholecystectomy; Hysterectomy; Coronary angioplasty with stent; Coronary artery bypass graft (10/30/2017); and Coronary artery bypass graft.    Medications:   Scheduled Meds:    sodium chloride flush  5-40 mL IntraVENous 2 times per day    enoxaparin  40 mg SubCUTAneous Daily    aspirin  81 mg Oral Daily    aspirin EC  81 mg Oral Daily    clopidogrel  75 mg Oral Daily    empagliflozin  10 mg Oral Daily    levothyroxine  112 mcg Oral Daily    isosorbide mononitrate  30 mg Oral Daily    liothyronine  25 mcg Oral Daily    lisinopril  5 mg Oral Nightly    metoprolol succinate  50 mg Oral Nightly    ranolazine  1,000 mg Oral BID    rosuvastatin  40 mg Oral Nightly    Insulin Degludec  30 Units SubCUTAneous Daily    vilazodone HCl  40 mg Oral QHS    vitamin D  50,000 Units Oral Once per day on Mon Thu     Continuous Infusions:    sodium chloride       PRN Meds: sodium chloride flush, sodium chloride, potassium chloride **OR** potassium alternative oral replacement **OR** potassium chloride, magnesium sulfate,  (CRESTOR) 40 MG tablet Take 1 tablet by mouth daily  Patient taking differently: Take 1 tablet by mouth at bedtime 8/11/21   Esteban Padgett MD   lisinopril (PRINIVIL;ZESTRIL) 5 MG tablet Take 1 tablet by mouth daily  Patient taking differently: Take 1 tablet by mouth nightly 8/11/21   Esteban Padgett MD     Coumadin Use Last 7 Days:  no  Antiplatelet drug therapy use last 7 days: yes - asa/plavix  Other anticoagulant use last 7 days: no  Additional Medication Information:  as above      Pre-Sedation Documentation and Exam:   I have personally completed a history, physical exam & review of systems for this patient (see notes).    Mallampati Airway Assessment:  Mallampati Class II - (soft palate, fauces & uvula are visible)    Prior History of Anesthesia Complications:   none    ASA Classification:  Class 2 - A normal healthy patient with mild systemic disease    Sedation/ Anesthesia Plan:   intravenous sedation    Medications Planned:   midazolam (Versed) intravenously and fentanyl intravenously    Patient is an appropriate candidate for plan of sedation: yes    Electronically signed by Dustin Reveles MD on 4/2/2024 at 1:40 PM

## 2024-04-02 NOTE — PROGRESS NOTES
Patient received post procedure in stable condition.   Post-cath handoff/site assessment performed to Right  femoral   Pt is alert and oriented x4, No distress noted. VSS-see flowsheet.   Post-cath restrictions/instructions provided  Patient provided with snack/drink.   No further needs at this time, call light within reach.   Dr. Reveles spoke to patient and family.

## 2024-04-02 NOTE — CARE COORDINATION
Discharge Planning:  SW attempted to meet with pt but pt was off the floor for a procedure.  SW will attempt later as time permits.   JATINDER Coello [facility]  [phone]  Electronically signed by JATINDER Herman on 4/2/2024 at 2:24 PM    Addendum:  Pt remains off the floor for a procedure.    JATINDER Coello [facility]  [phone]  Electronically signed by JATINDER Herman on 4/2/2024 at 3:48 PM

## 2024-04-02 NOTE — PROGRESS NOTES
Pharmacy Medication Reconciliation Note     List of medications patient is currently taking is complete.    Source of information:   1. Conversation with pt at bedside     No Known Allergies    Notes regarding home medications:   1.  Imdur was recently decreased to 30 mg daily ( she cuts a 60 mg in half)   2. Novolog sliding scale. Usually gives herself between 3-6 units. Tresiba dose is 30 units daily.     Milena Scott [facility]   4/2/2024  1:22 PM

## 2024-04-02 NOTE — PROGRESS NOTES
4 Eyes Skin Assessment     NAME:  Albino Uriostegui  YOB: 1965  MEDICAL RECORD NUMBER:  [id number]    The patient is being assessed for  Admission    I agree that at least one RN has performed a thorough Head to Toe Skin Assessment on the patient. ALL assessment sites listed below have been assessed.      Areas assessed by both nurses:    Head, Face, Ears, Shoulders, Back, Chest, Arms, Elbows, Hands, Sacrum. Buttock, Coccyx, Ischium, Legs. Feet and Heels, and Under Medical Devices         Does the Patient have a Wound? No noted wound(s)       Tobias Prevention initiated by RN: No  Wound Care Orders initiated by RN: No    Pressure Injury (Stage 3,4, Unstageable, DTI, NWPT, and Complex wounds) if present, place Wound referral order by RN under : No    New Ostomies, if present place, Ostomy referral order under : No     Patient has puncture site to right groin.  Dressing clean, dry/intact.  Zero swelling or bleeding noted.  Pt denies pain    Nurse 1 eSignature: Electronically signed by Genesis Cheney RN on 4/2/24 at 6:17 PM EDT    **SHARE this note so that the co-signing nurse can place an eSignature**    Nurse 2 eSignature: Electronically signed by Janelle Eldridge RN on 4/2/24 at 11:51 PM EDT

## 2024-04-02 NOTE — H&P
[facility]      History of Present Illness:  Albino Uriostegui is a 58 y.o. patient who presented to the hospital with complaints of leg pain. I have been asked to provide consultation regarding further management and testing.    Established aptient of Dr. Sy my partner - patient with known severe vascular disease including CAD s/p CABG.  History of increasing pain of bilateral LE to the point where she is now having rest pain of her bilateral legs.  Presented for vascular doppler evaluation today and cardiology was consulted regarding urgent results.    Patient unable to ambulate without severe pain - decision was made to admit and perform emergent angiography    Past Medical History:   has a past medical history of CAD (coronary artery disease), Chest pain, Depression, Diabetes mellitus (HCC), Family history of early CAD, Female pelvic pain, HTN (hypertension), Hyperlipidemia, Hypothyroidism, Rectal bleeding, and Tobacco user.    Surgical History:   has a past surgical history that includes Cholecystectomy; Hysterectomy; Coronary angioplasty with stent; Coronary artery bypass graft (10/30/2017); and Coronary artery bypass graft.     Social History:   reports that she quit smoking about 6 years ago. Her smoking use included cigarettes. She started smoking about 26 years ago. She has a 5.0 pack-year smoking history. She has never used smokeless tobacco. She reports that she does not currently use alcohol. She reports that she does not use drugs.     Family History:  No family history of premature coronary artery disease, aortic disease, or valve disease.    Home Medications:  Were reviewed and are listed in nursing record. and/or listed below  Prior to Admission medications    Medication Sig Start Date End Date Taking? Authorizing Provider   isosorbide mononitrate (IMDUR) 60 MG extended release tablet Take 0.5 tablets by mouth daily 3/29/24   Alcides Sy MD   ranolazine (RANEXA) 1000 MG extended  Dustin Reveles MD        lisinopril (PRINIVIL;ZESTRIL) tablet 5 mg  5 mg Oral Nightly Dustin Reveles MD        metoprolol succinate (TOPROL XL) extended release tablet 50 mg  50 mg Oral Nightly Dustin Reveles MD        ranolazine (RANEXA) extended release tablet 1,000 mg  1,000 mg Oral BID Dustin Reveles MD        rosuvastatin (CRESTOR) tablet 40 mg  40 mg Oral Nightly Dustin Reveles MD        vilazodone HCl (VIIBRYD) TABS 40 mg  40 mg Oral QHS Dustin Reveles MD        [START ON 4/4/2024] vitamin D (ERGOCALCIFEROL) capsule 50,000 Units  50,000 Units Oral Once per day on Mon Thu Dustin Reveles MD        sodium chloride flush 0.9 % injection 5-40 mL  5-40 mL IntraVENous 2 times per day Dustin Reveles MD        sodium chloride flush 0.9 % injection 5-40 mL  5-40 mL IntraVENous PRN Dustin Reveles MD        0.9 % sodium chloride infusion   IntraVENous PRN Dustin Reveles MD        insulin glargine (LANTUS) injection vial 24 Units  24 Units SubCUTAneous Daily Dustin Reveles MD            Allergies:  Patient has no known allergies.     Review of Systems:   Review of Systems - Negative except noted in HPI     Physical Examination:    Vitals:    04/02/24 1615   BP: (Abnormal) 158/73   Pulse: 71   SpO2: 98%              Physical Exam  Vitals and nursing note reviewed.   Constitutional:       Appearance: Normal appearance. She is not ill-appearing or diaphoretic.   HENT:      Head: Normocephalic and atraumatic.      Mouth/Throat:      Mouth: Mucous membranes are moist.   Eyes:      Pupils: Pupils are equal, round, and reactive to light.   Cardiovascular:      Rate and Rhythm: Normal rate and regular rhythm.      Pulses: Decreased pulses.           Femoral pulses are 2+ on the right side and 2+ on the left side.       Popliteal pulses are 2+ on the right side and 1+ on the left side.        Dorsalis pedis pulses are 1+ on the right side and  alone

## 2024-04-02 NOTE — PROCEDURES
[facility]     Pre-operative Diagnosis:   1. Peripheral arterial disease with critical limb ischemia / claudication; desiree category IV    Post-operative Diagnosis:   1. Same     Procedures Performed:  - Right femoral artery access under fluroscopic and ultrasound guidance  - Right iliofemoral arteriogram.  - Aortoiliac arteriogram.  - Bilateral lower extremity arteriogram  - Percutaneous balloon angioplasty of left superficial femoral artery using a 5.0mm x 150 mm   - Percutaneous drug coated balloon angioplasty of left superficial femoral artery using two 6.0 mm x 220 mm  - Percutaneous balloon angioplasty of left popliteal artery using a 3.0 mm x 150 mm  - Percutaneous balloon angioplasty of left posterior tibial artery using a 3.0 mm x 150 mm  - Completion arteriogram.      :  Dustin Reveles MD.    Anesthesia:  IV sedation and local anesthesia.    Moderate Sedation:  Start time: 1422  Stop time: 1517  4 mg versed   200 mcg fentanyl   An independent trained observer pushed medications at my direction. We monitored the patient's level of consciousness and vital signs/physiologic status throughout the procedure duration (see start and start times above).     Estimated Blood Loss:  <20 mL.    Indications for Procedure:  Albino Uriostegui is a 58 y.o. female who was evaluated as an outpatient with chronic arterial insufficiency with rest pain and was deemed an appropriate candidate for an angiogram and possible intervention. Informed consent was obtained after discussion of potential benefits, alternatives and risks of the procedure, including but not limited to bleeding, infection, worsening of arterial insufficiency, and kidney injury due to contrast administration.    Details of Procedure:  The patient was taken to the cardiac cath lab and placed in supine position.  IV sedation anesthesia was administered by the anesthesia team. The operative area was clipped, prepared and  femoral artery using a 5.0mm x 150 mm   - Percutaneous drug coated balloon angioplasty of left superficial femoral artery using two 6.0 mm x 220 mm  - Percutaneous balloon angioplasty of left popliteal artery using a 3.0 mm x 150 mm  - Percutaneous balloon angioplasty of left posterior tibial artery using a 3.0 mm x 150 mm    A completion arteriogram was performed.     The sheath was withdrawn over a wire then exchanged for a short sheath.  A Mynx closure device was used to close the arteriotomy.    The patient tolerated the procedure well, was awakened and was taken to the post-operative care unit in stable condition.     Pre:  100% occlusion of the L SFA  90% occlusion of the L Popliteal artery  90% occlusion of the L PT artery    Post:   0% residual stenosis of above vessels    Impression/Plan:   - Severe PAD  - Plan for staged intervention of the RLE in a few weeks  - Will discuss renal revascularization with Dr. Sunny Reveles MD  [facility]   (C): [phone]  (O): [phone]

## 2024-04-03 ENCOUNTER — TELEPHONE (OUTPATIENT)
Dept: CARDIOLOGY CLINIC | Age: 59
End: 2024-04-03

## 2024-04-03 VITALS
SYSTOLIC BLOOD PRESSURE: 95 MMHG | HEART RATE: 80 BPM | DIASTOLIC BLOOD PRESSURE: 53 MMHG | RESPIRATION RATE: 15 BRPM | WEIGHT: 188.27 LBS | BODY MASS INDEX: 28.53 KG/M2 | TEMPERATURE: 98 F | HEIGHT: 68 IN | OXYGEN SATURATION: 97 %

## 2024-04-03 DIAGNOSIS — I73.9 PAD (PERIPHERAL ARTERY DISEASE) (HCC): Primary | ICD-10-CM

## 2024-04-03 LAB
ANION GAP SERPL CALCULATED.3IONS-SCNC: 9 MMOL/L (ref 3–16)
BUN SERPL-MCNC: 30 MG/DL (ref 7–20)
CALCIUM SERPL-MCNC: 9.3 MG/DL (ref 8.3–10.6)
CHLORIDE SERPL-SCNC: 104 MMOL/L (ref 99–110)
CO2 SERPL-SCNC: 24 MMOL/L (ref 21–32)
CREAT SERPL-MCNC: 1.3 MG/DL (ref 0.6–1.1)
DEPRECATED RDW RBC AUTO: 13.8 % (ref 12.4–15.4)
EST. AVERAGE GLUCOSE BLD GHB EST-MCNC: 226 MG/DL
GFR SERPLBLD CREATININE-BSD FMLA CKD-EPI: 47 ML/MIN/{1.73_M2}
GLUCOSE BLD-MCNC: 237 MG/DL (ref 70–99)
GLUCOSE BLD-MCNC: 287 MG/DL (ref 70–99)
GLUCOSE SERPL-MCNC: 206 MG/DL (ref 70–99)
HBA1C MFR BLD: 9.5 %
HCT VFR BLD AUTO: 35.3 % (ref 36–48)
HGB BLD-MCNC: 11.9 G/DL (ref 12–16)
MCH RBC QN AUTO: 29.1 PG (ref 26–34)
MCHC RBC AUTO-ENTMCNC: 33.9 G/DL (ref 31–36)
MCV RBC AUTO: 85.8 FL (ref 80–100)
PERFORMED ON: ABNORMAL
PERFORMED ON: ABNORMAL
PLATELET # BLD AUTO: 234 K/UL (ref 135–450)
PMV BLD AUTO: 8.3 FL (ref 5–10.5)
POTASSIUM SERPL-SCNC: 4.4 MMOL/L (ref 3.5–5.1)
RBC # BLD AUTO: 4.11 M/UL (ref 4–5.2)
SODIUM SERPL-SCNC: 137 MMOL/L (ref 136–145)
WBC # BLD AUTO: 11.3 K/UL (ref 4–11)

## 2024-04-03 PROCEDURE — 85027 COMPLETE CBC AUTOMATED: CPT

## 2024-04-03 PROCEDURE — 36415 COLL VENOUS BLD VENIPUNCTURE: CPT

## 2024-04-03 PROCEDURE — G0378 HOSPITAL OBSERVATION PER HR: HCPCS

## 2024-04-03 PROCEDURE — 6370000000 HC RX 637 (ALT 250 FOR IP): Performed by: STUDENT IN AN ORGANIZED HEALTH CARE EDUCATION/TRAINING PROGRAM

## 2024-04-03 PROCEDURE — 80048 BASIC METABOLIC PNL TOTAL CA: CPT

## 2024-04-03 PROCEDURE — 6360000002 HC RX W HCPCS: Performed by: STUDENT IN AN ORGANIZED HEALTH CARE EDUCATION/TRAINING PROGRAM

## 2024-04-03 PROCEDURE — 96372 THER/PROPH/DIAG INJ SC/IM: CPT

## 2024-04-03 PROCEDURE — 2580000003 HC RX 258: Performed by: STUDENT IN AN ORGANIZED HEALTH CARE EDUCATION/TRAINING PROGRAM

## 2024-04-03 PROCEDURE — 99239 HOSP IP/OBS DSCHRG MGMT >30: CPT | Performed by: STUDENT IN AN ORGANIZED HEALTH CARE EDUCATION/TRAINING PROGRAM

## 2024-04-03 RX ADMIN — CLOPIDOGREL BISULFATE 75 MG: 75 TABLET ORAL at 08:02

## 2024-04-03 RX ADMIN — LIOTHYRONINE SODIUM 25 MCG: 25 TABLET ORAL at 06:27

## 2024-04-03 RX ADMIN — ASPIRIN 81 MG: 81 TABLET, COATED ORAL at 08:02

## 2024-04-03 RX ADMIN — LEVOTHYROXINE SODIUM 112 MCG: 0.11 TABLET ORAL at 06:26

## 2024-04-03 RX ADMIN — INSULIN LISPRO 2 UNITS: 100 INJECTION, SOLUTION INTRAVENOUS; SUBCUTANEOUS at 08:47

## 2024-04-03 RX ADMIN — RANOLAZINE 1000 MG: 500 TABLET, FILM COATED, EXTENDED RELEASE ORAL at 08:03

## 2024-04-03 RX ADMIN — INSULIN LISPRO 1 UNITS: 100 INJECTION, SOLUTION INTRAVENOUS; SUBCUTANEOUS at 13:01

## 2024-04-03 RX ADMIN — INSULIN GLARGINE 24 UNITS: 100 INJECTION, SOLUTION SUBCUTANEOUS at 08:03

## 2024-04-03 RX ADMIN — ENOXAPARIN SODIUM 40 MG: 100 INJECTION SUBCUTANEOUS at 08:03

## 2024-04-03 RX ADMIN — EMPAGLIFLOZIN 10 MG: 10 TABLET, FILM COATED ORAL at 08:02

## 2024-04-03 RX ADMIN — ISOSORBIDE MONONITRATE 30 MG: 30 TABLET, EXTENDED RELEASE ORAL at 08:02

## 2024-04-03 RX ADMIN — SODIUM CHLORIDE, PRESERVATIVE FREE 10 ML: 5 INJECTION INTRAVENOUS at 08:04

## 2024-04-03 NOTE — PROGRESS NOTES
Blood sugar 377 with HS check. Medicated with Lantus 24 units as ordered and call placed to cardiology to notify and for further orders. Medicated with low dose sliding scale as ordered. See Tatianna Eldridge RN

## 2024-04-03 NOTE — PROGRESS NOTES
IV and telemetry monitor removed. Discharge paperwork completed and discussed with the patient and . All questions answered. Patient has been made aware of follow up appointments that have been made/need to be made and patient verbalized understanding. Patient has been given all paper prescriptions that need to be filled or the medications have been filled with [facility] retail pharmacy and patient participated in Meds to Beds. All belongings have been packed up and sent with the patient. Patient will be driven home by .

## 2024-04-03 NOTE — PLAN OF CARE
Problem: Pain  Goal: Verbalizes/displays adequate comfort level or baseline comfort level  4/3/2024 1103 by Sandra Cordon RN  Outcome: Progressing   Pain/discomfort being managed with PRN analgesics per MD orders. Pt able to express presence and absence of pain and rate pain appropriately using numerical scale.

## 2024-04-03 NOTE — CARE COORDINATION
04/03/24 1200   Readmission Assessment   Number of Days since last admission? 8-30 days   Previous Disposition Home with Family   Who is being Interviewed Patient   What was the patient's/caregiver's perception as to why they think they needed to return back to the hospital? Other (Comment)  (increased leg pain)   Did you visit your Primary Care Physician after you left the hospital, before you returned this time? No   Why weren't you able to visit your PCP? Did not have an appointment   Did you see a specialist, such as Cardiac, Pulmonary, Orthopedic Physician, etc. after you left the hospital? Yes  (cardiology)   Who advised the patient to return to the hospital? Self-referral;Physician   Does the patient report anything that got in the way of taking their medications? No   In our efforts to provide the best possible care to you and others like you, can you think of anything that we could have done to help you after you left the hospital the first time, so that you might not have needed to return so soon? Other (Comment)  (no)     #[phone]  Electronically signed by Shelia Pa RN on 4/3/2024 at 12:03 PM

## 2024-04-03 NOTE — PROGRESS NOTES
Rested well through night. Reports tingling pain/ sensations to LLE. Also now able to wiggle toes which she wasn't able to do prior to yesterday's procedure. Site remains clean, dry and intact. No signs of bleeding, swelling, or hematoma noted. Continue to monitor.Janelle Eldridge RN

## 2024-04-03 NOTE — TELEPHONE ENCOUNTER
Please call patient to schedule a right lower extremity peripheral angiogram with staged intervention to the Right SFA with radial access with Dr. Reveles in 2 weeks at [facility]. Thank you.     Just FYI - patient will need scheduled renal artery stenting, but Dr. Reveles states this will be scheduled after she is seen in office post-peripheral.       The morning of your procedure you will park in the hospital parking lot and report directly to the cath lab to check in.     Pre-Procedure Instructions   You will need to fast for at least 8 hours prior to procedure. No caffeine the morning of.   Hold all diabetic medications including, Metfomin.  If you take Lantus/Levemir only take ½ your normal dose the evening before.  All other medications can be taken in the morning with sips of water, including Asprin and Plavix.   Do not use any lotions, creams or perfume the morning of procedure.   Pre-procedure lab work will need to be completed 5-7 days prior to procedure.   Please have a responsible adult to drive you home after procedure. We advise you have someone to stay with you for 24 hours following procedure for precautionary measures. Depending on procedure you may require an overnight stay.   Cath lab will provide you with all post procedure instructions.     If you have any questions regarding the procedure itself or medications, please call [phone] and ask to speak with a nurse.

## 2024-04-03 NOTE — PROGRESS NOTES
Case Management Assessment  Initial Evaluation    Date/Time of Evaluation: 4/3/2024 12:06 PM  Assessment Completed by: Shelia Pa RN    If patient is discharged prior to next notation, then this note serves as note for discharge by case management.    Patient Name: Albino Uriostegui                   YOB: 1965  Diagnosis: Ischemic foot [I99.8]  Claudication, class IV (HCC) [I73.9]                   Date / Time: 4/2/2024 12:53 PM    Patient Admission Status: Inpatient   Readmission Risk (Low < 19, Mod (19-27), High > 27): Readmission Risk Score: 14.6    Current PCP: Jade Unger APRN  PCP verified by CM? Yes    Chart Reviewed: Yes      History Provided by: Patient, medical record  Patient Orientation: Alert and Oriented    Patient Cognition: Alert    Hospitalization in the last 30 days (Readmission):  Yes    If yes, Readmission Assessment in CM Navigator will be completed.    Advance Directives:      Code Status: Full Code   Patient's Primary Decision Maker is: Legal Next of Kin      Discharge Planning:    Patient lives with: Spouse/Significant Other Type of Home: House  Primary Care Giver: Self  Patient Support Systems include: Spouse/Significant Other   Current Financial resources: Other (Comment)  Current community resources: None  Current services prior to admission: None            Current DME:  None            Type of Home Care services:  None    ADLS  Prior functional level: Independent in ADLs/IADLs  Current functional level: Independent in ADLs/IADLs    No current PT/OT orders    Family can provide assistance at DC: Yes  Would you like Case Management to discuss the discharge plan with any other family members/significant others, and if so, who? No  Plans to Return to Present Housing: Yes  Other Identified Issues/Barriers to RETURNING to current housing: None  Potential Assistance needed at discharge: N/A            Potential DME:  None  Patient expects to discharge to: House  Plan for

## 2024-04-03 NOTE — PLAN OF CARE
Problem: Discharge Planning  Goal: Discharge to home or other facility with appropriate resources  Outcome: Progressing  Flowsheets (Taken 4/3/2024 0239)  Discharge to home or other facility with appropriate resources:   Identify barriers to discharge with patient and caregiver   Arrange for needed discharge resources and transportation as appropriate     Problem: ABCDS Injury Assessment  Goal: Absence of physical injury  Outcome: Progressing  Flowsheets (Taken 4/3/2024 0239)  Absence of Physical Injury: Implement safety measures based on patient assessment     Problem: Pain  Goal: Verbalizes/displays adequate comfort level or baseline comfort level  Outcome: Progressing  Flowsheets (Taken 4/3/2024 0239)  Verbalizes/displays adequate comfort level or baseline comfort level:   Encourage patient to monitor pain and request assistance   Assess pain using appropriate pain scale   Administer analgesics based on type and severity of pain and evaluate response   Implement non-pharmacological measures as appropriate and evaluate response

## 2024-04-04 ENCOUNTER — TELEPHONE (OUTPATIENT)
Dept: CARDIOLOGY CLINIC | Age: 59
End: 2024-04-04

## 2024-04-04 DIAGNOSIS — Z01.818 PREOP TESTING: ICD-10-CM

## 2024-04-04 DIAGNOSIS — I73.9 PAD (PERIPHERAL ARTERY DISEASE) (HCC): Primary | ICD-10-CM

## 2024-04-04 NOTE — TELEPHONE ENCOUNTER
Date of Procedure: Wednesday 4/17/24 @ [facility] with Dr. Reveles     Time of arrival: 7:00 am     Procedure time: 8:30 am     Called and spoke to Albino and she is agreeable to date and time. Reviewed instructions and she verbalized understanding. Encouraged to call with any questions or concerns.     Published on [employer] and e-mail to Archana.

## 2024-04-04 NOTE — TELEPHONE ENCOUNTER
Yoly,     Can you please get this scheduled Peripheral angiogram first   Then renal artery intervention under Dr. Correa on a day I can do the procedure with him in the next couple of months     Orders placed for 2 peripheral procedures     Please go for blood work one week prior to your procedure.       The morning of your procedure you will park in the hospital parking lot and report directly to the cath lab to check in.     Pre-Procedure Instructions   You will need to fast for at least 8 hours prior to procedure. No caffeine the morning of.   Hold all diabetic medications including, Metfomin.  If you take Lantus/Levemir only take ½ your normal dose the evening before.  All other medications can be taken in the morning with sips of water.   Do not use any lotions, creams or perfume the morning of procedure.   Pre-procedure lab work will need to be completed 5-7 days prior to procedure.   Please have a responsible adult to drive you home after procedure. We advise you have someone to stay with you for 24 hours following procedure for precautionary measures. Depending on procedure you may require an overnight stay.   Cath lab will provide you with all post procedure instructions.     If you have any questions regarding the procedure itself or medications, please call [phone] and ask to speak with a nurse.      Thank you

## 2024-04-09 ENCOUNTER — TELEPHONE (OUTPATIENT)
Dept: CARDIOLOGY CLINIC | Age: 59
End: 2024-04-09

## 2024-04-09 DIAGNOSIS — I73.9 PAD (PERIPHERAL ARTERY DISEASE) (HCC): Primary | ICD-10-CM

## 2024-04-09 NOTE — TELEPHONE ENCOUNTER
Left foot hurting really bad sharp pain shooting up from side of foot.  Reported significant redness on, Friday and Saturday, but today while foot is slightly red it is much improved. She also has knot on bottom of foot between arch of foot and digitus minimus pedis (pinky toe). Denies any open wounds.  She is s/p Percutaneous balloon angioplasty (left superficial femoral, left popliteal and left tibial) on 04/02/2024 with Dr. Reveles.     I have instructed ice therapy and Tylenol for pain as needed.  Continue to monitor symptoms.     Please advise of any further recommendations.    Thanks,  Leola Woo RN

## 2024-04-10 ENCOUNTER — HOSPITAL ENCOUNTER (OUTPATIENT)
Dept: VASCULAR LAB | Age: 59
Discharge: HOME | End: 2024-04-10
Attending: INTERNAL MEDICINE

## 2024-04-10 DIAGNOSIS — I73.9 PAD (PERIPHERAL ARTERY DISEASE) (HCC): ICD-10-CM

## 2024-04-10 NOTE — TELEPHONE ENCOUNTER
Called patient no answer left message on VM advising of need for US of right leg to further assess arteries. I advised I would have the  call her with date and time and to call back with any questions and to confirm receipt of message.

## 2024-04-11 DIAGNOSIS — I73.9 PAD (PERIPHERAL ARTERY DISEASE) (HCC): ICD-10-CM

## 2024-04-11 DIAGNOSIS — Z01.818 PREOP TESTING: ICD-10-CM

## 2024-04-11 LAB
ANION GAP SERPL CALCULATED.3IONS-SCNC: 10 MMOL/L (ref 3–16)
BUN SERPL-MCNC: 26 MG/DL (ref 7–20)
CALCIUM SERPL-MCNC: 9.9 MG/DL (ref 8.3–10.6)
CHLORIDE SERPL-SCNC: 104 MMOL/L (ref 99–110)
CO2 SERPL-SCNC: 27 MMOL/L (ref 21–32)
CREAT SERPL-MCNC: 1.1 MG/DL (ref 0.6–1.1)
DEPRECATED RDW RBC AUTO: 14.1 % (ref 12.4–15.4)
GFR SERPLBLD CREATININE-BSD FMLA CKD-EPI: 58 ML/MIN/{1.73_M2}
GLUCOSE SERPL-MCNC: 248 MG/DL (ref 70–99)
HCT VFR BLD AUTO: 38.5 % (ref 36–48)
HGB BLD-MCNC: 12.8 G/DL (ref 12–16)
MCH RBC QN AUTO: 28.8 PG (ref 26–34)
MCHC RBC AUTO-ENTMCNC: 33.4 G/DL (ref 31–36)
MCV RBC AUTO: 86.4 FL (ref 80–100)
PLATELET # BLD AUTO: 325 K/UL (ref 135–450)
PMV BLD AUTO: 8.1 FL (ref 5–10.5)
POTASSIUM SERPL-SCNC: 4.7 MMOL/L (ref 3.5–5.1)
RBC # BLD AUTO: 4.45 M/UL (ref 4–5.2)
SODIUM SERPL-SCNC: 141 MMOL/L (ref 136–145)
WBC # BLD AUTO: 9.8 K/UL (ref 4–11)

## 2024-04-11 NOTE — TELEPHONE ENCOUNTER
Testing has resulted summary below please review and advise.   Summary     Elevated velocity of the left proximal anterior tibial artery suggest a greater than 70% stenosis. Short segment occlusion of the left distal anterior tibial artery, the flow reconstitutes at the ankle.    On 04/02/2024 she underwent below procedure with Dr. Reveles.  - Percutaneous balloon angioplasty of left superficial femoral artery using a 5.0mm x 150 mm   - Percutaneous drug coated balloon angioplasty of left superficial femoral artery using two 6.0 mm x 220 mm  - Percutaneous balloon angioplasty of left popliteal artery using a 3.0 mm x 150 mm  - Percutaneous balloon angioplasty of left posterior tibial artery using a 3.0 mm x 150 mm.

## 2024-04-11 NOTE — TELEPHONE ENCOUNTER
Discussed with Dr. Queen, he stated the flow to the leg looks good continue medical therapy, and encourage to walk as much as tolerated. Referral to [facility] Podiatrist.     Spoke with patient advised of orders and recommendations verbalized understanding.

## 2024-04-17 ENCOUNTER — HOSPITAL ENCOUNTER (OUTPATIENT)
Dept: CARDIAC CATH/INVASIVE PROCEDURES | Age: 59
Discharge: HOME | End: 2024-04-17
Payer: COMMERCIAL

## 2024-04-17 ENCOUNTER — TELEPHONE (OUTPATIENT)
Dept: CARDIOLOGY CLINIC | Age: 59
End: 2024-04-17

## 2024-04-17 VITALS
HEART RATE: 67 BPM | OXYGEN SATURATION: 95 % | RESPIRATION RATE: 13 BRPM | SYSTOLIC BLOOD PRESSURE: 140 MMHG | DIASTOLIC BLOOD PRESSURE: 68 MMHG | TEMPERATURE: 97.6 F | HEIGHT: 68 IN | WEIGHT: 188 LBS | BODY MASS INDEX: 28.49 KG/M2

## 2024-04-17 DIAGNOSIS — I70.1 RENAL ARTERY STENOSIS (HCC): Primary | ICD-10-CM

## 2024-04-17 PROCEDURE — 2500000003 HC RX 250 WO HCPCS

## 2024-04-17 PROCEDURE — C1725 CATH, TRANSLUMIN NON-LASER: HCPCS | Performed by: STUDENT IN AN ORGANIZED HEALTH CARE EDUCATION/TRAINING PROGRAM

## 2024-04-17 PROCEDURE — C1894 INTRO/SHEATH, NON-LASER: HCPCS | Performed by: STUDENT IN AN ORGANIZED HEALTH CARE EDUCATION/TRAINING PROGRAM

## 2024-04-17 PROCEDURE — C1887 CATHETER, GUIDING: HCPCS | Performed by: STUDENT IN AN ORGANIZED HEALTH CARE EDUCATION/TRAINING PROGRAM

## 2024-04-17 PROCEDURE — 99152 MOD SED SAME PHYS/QHP 5/>YRS: CPT

## 2024-04-17 PROCEDURE — C1769 GUIDE WIRE: HCPCS | Performed by: STUDENT IN AN ORGANIZED HEALTH CARE EDUCATION/TRAINING PROGRAM

## 2024-04-17 PROCEDURE — 2580000003 HC RX 258: Performed by: STUDENT IN AN ORGANIZED HEALTH CARE EDUCATION/TRAINING PROGRAM

## 2024-04-17 PROCEDURE — 2709999900 HC NON-CHARGEABLE SUPPLY: Performed by: STUDENT IN AN ORGANIZED HEALTH CARE EDUCATION/TRAINING PROGRAM

## 2024-04-17 PROCEDURE — 6370000000 HC RX 637 (ALT 250 FOR IP): Performed by: STUDENT IN AN ORGANIZED HEALTH CARE EDUCATION/TRAINING PROGRAM

## 2024-04-17 PROCEDURE — 6360000004 HC RX CONTRAST MEDICATION: Performed by: STUDENT IN AN ORGANIZED HEALTH CARE EDUCATION/TRAINING PROGRAM

## 2024-04-17 PROCEDURE — 99153 MOD SED SAME PHYS/QHP EA: CPT

## 2024-04-17 PROCEDURE — 37224 HC FEM POP TERRITORY PLASTY: CPT

## 2024-04-17 PROCEDURE — C2623 CATH, TRANSLUMIN, DRUG-COAT: HCPCS | Performed by: STUDENT IN AN ORGANIZED HEALTH CARE EDUCATION/TRAINING PROGRAM

## 2024-04-17 PROCEDURE — 6360000002 HC RX W HCPCS

## 2024-04-17 PROCEDURE — 6370000000 HC RX 637 (ALT 250 FOR IP)

## 2024-04-17 RX ORDER — SODIUM CHLORIDE 9 MG/ML
INJECTION, SOLUTION INTRAVENOUS PRN
Status: DISCONTINUED | OUTPATIENT
Start: 2024-04-17 | End: 2024-04-18 | Stop reason: HOSPADM

## 2024-04-17 RX ORDER — SODIUM CHLORIDE 0.9 % (FLUSH) 0.9 %
5-40 SYRINGE (ML) INJECTION EVERY 12 HOURS SCHEDULED
OUTPATIENT
Start: 2024-04-17

## 2024-04-17 RX ORDER — SODIUM CHLORIDE 0.9 % (FLUSH) 0.9 %
5-40 SYRINGE (ML) INJECTION PRN
OUTPATIENT
Start: 2024-04-17

## 2024-04-17 RX ORDER — CLOPIDOGREL BISULFATE 75 MG/1
75 TABLET ORAL ONCE
Status: COMPLETED | OUTPATIENT
Start: 2024-04-17 | End: 2024-04-17

## 2024-04-17 RX ORDER — SODIUM CHLORIDE 0.9 % (FLUSH) 0.9 %
5-40 SYRINGE (ML) INJECTION PRN
Status: DISCONTINUED | OUTPATIENT
Start: 2024-04-17 | End: 2024-04-18 | Stop reason: HOSPADM

## 2024-04-17 RX ORDER — ACETAMINOPHEN 325 MG/1
650 TABLET ORAL EVERY 4 HOURS PRN
OUTPATIENT
Start: 2024-04-17

## 2024-04-17 RX ORDER — ASPIRIN 325 MG
325 TABLET ORAL ONCE
Status: COMPLETED | OUTPATIENT
Start: 2024-04-17 | End: 2024-04-17

## 2024-04-17 RX ORDER — SODIUM CHLORIDE 9 MG/ML
INJECTION, SOLUTION INTRAVENOUS PRN
OUTPATIENT
Start: 2024-04-17

## 2024-04-17 RX ORDER — SODIUM CHLORIDE 0.9 % (FLUSH) 0.9 %
5-40 SYRINGE (ML) INJECTION EVERY 12 HOURS SCHEDULED
Status: DISCONTINUED | OUTPATIENT
Start: 2024-04-17 | End: 2024-04-18 | Stop reason: HOSPADM

## 2024-04-17 RX ADMIN — IOPAMIDOL 75 ML: 755 INJECTION, SOLUTION INTRAVENOUS at 09:24

## 2024-04-17 RX ADMIN — SODIUM CHLORIDE: 9 INJECTION, SOLUTION INTRAVENOUS at 07:19

## 2024-04-17 RX ADMIN — ASPIRIN 325 MG: 325 TABLET ORAL at 07:20

## 2024-04-17 RX ADMIN — Medication 10 ML: at 07:16

## 2024-04-17 RX ADMIN — CLOPIDOGREL BISULFATE 75 MG: 75 TABLET ORAL at 07:20

## 2024-04-17 NOTE — PROGRESS NOTES
Patient with nurse ambulated to BR and voided.  Patient ambulated back to room and is getting dressed

## 2024-04-17 NOTE — PROGRESS NOTES
Dr. Reveles speaking with patient and family regarding procedure.  Questions and concerns addressed

## 2024-04-17 NOTE — PROGRESS NOTES
Discharge instructions reviewed with patient and family member.  . All medication side effects reviewed and patient and family verbalized understanding. Follow up appointment(s) reviewed with patient and family.   Patient given discharge instructions, and appointment times.

## 2024-04-17 NOTE — PROGRESS NOTES
PRE-PROCEDURE      DATE: 4/17/2024 ARRIVAL TO CATH LAB: 6:59 AM    ADMIT SOURCE: Outpatient    ID & ALLERGY BAND: On    CONSENT: Yes    NPO SINCE: Midnight    LABS: N/A  PREGNANCY TEST: NA    PULSES - see flowsheet    BLEEDING PROBLEMS: No    LAST DOSE (if applicable):  ASA: 04/17/24  P2Y12 (Plavix, Effient, Brilinta): 04/17/24  Anticoagulants (Coumadin, Xarelto, Eliquis): NA  Other Blood Thinners: NA      OTHER MEDICATIONS TAKEN AT HOME:Liothyronine      MEDICATION COMPLIANCE: Yes

## 2024-04-17 NOTE — DISCHARGE INSTRUCTIONS
RX PROGRESS NOTE: Antibiotic Timeout Assessment      Indication for therapy: UTI    Antimicrobial agents assessed: ceftriaxone    Assessment/Plan:  Pharmacy has noted 72 hours of empiric therapy. Plan to continue current therapy and transition to PO cefpodoxime to complete antibiotic course on discharge.     Pharmacy will continue to monitor and assess microbiology, duration, and appropriateness of antibiotic therapy and contact provider as appropriate.    Thank you,  Ck Hwang [facility]  3/23/2022 9:09 AM  Contact # [phone]     PERIPHERAL ANGIOGRAM- RADIAL ACCESS    Care of your puncture site:  Remove clear bandage 24 hours after the procedure.  2024  May shower in 24 hours  Inspect the site daily and gently clean using soap and water.  Dry thoroughly and apply a Band-Aid for 2 days    Normal Observations:  Soreness or tenderness which may last one week.  Mild oozing from the incision site.  Possible bruising that could last 2 weeks.    Activity:  You may resume driving 24 hours following the procedure.  You may resume normal activity in 3 days or after the wound heals.  Avoid lifting more than 10 pounds for 3 days with affected arm.  Do not make important / legal decisions within 24 hours after procedure.    Nutrition:  Regular diet or previous diet.  Drink at least 8 to 10 glasses of decaffeinated, non-alcoholic fluid for the next 24 hours to flush the x-ray dye used for your angiogram out of your body.    Call your doctor immediately if your condition worsens, for any other concerns, for a follow-up appointment or if you experience any of the [phone]  Significant bleeding that does not stop after 10 minutes of applying firm pressure on the puncture site. CALL 911  Increased swelling of the wrist.  Unusual pain, numbness, or tingling of the wrist/arm.   Any signs of infection such as: redness, yellow drainage at the site, swelling or pain.  IF experiencing any recurrent chest pain, arm or jaw pain, shortness of breath,sweating,nausea & vomiting, lighteheadedness or sudden weak.

## 2024-04-17 NOTE — TELEPHONE ENCOUNTER
Please schedule renal artery intervention with Dr Reveles and Dr Correa. If you schedule I can go over date/time and instructions when she Dr Correa in the office on 4/25.      The morning of your procedure you will park in the hospital parking lot and report directly to the cath lab to check in.     Pre-Procedure Instructions   You will need to fast for at least 8 hours prior to procedure. No caffeine the morning of.    Hold all diabetic medications including, Metfomin.  If you take Lantus/Levemir only take ½ your normal dose the evening before.  All other medications can be taken in the morning with sips of water.   Do not use any lotions, creams or perfume the morning of procedure.   Pre-procedure lab work will need to be completed 5-7 days prior to procedure.   Please have a responsible adult to drive you home after procedure. We advise you have someone to stay with you for 24 hours following procedure for precautionary measures. Depending on procedure you may require an overnight stay.   Cath lab will provide you with all post procedure instructions.     If you have any questions regarding the procedure itself or medications, please call [phone] and ask to speak with a nurse.

## 2024-04-17 NOTE — FLOWSHEET NOTE
Patient received post procedure in stable condition.   Post-cath handoff/site assessment performed to left radial   Pt is alert and oriented x4, No distress noted. VSS-see flowsheet.   Post-cath restrictions/instructions provided  Patient provided with snack/drink.   No further needs at this time, call light within reach.   MD to talk to patient and family soon.

## 2024-04-17 NOTE — TELEPHONE ENCOUNTER
Date of Procedure: Wednesday 5/15/24 @ [facility] with Dr. Correa & Dr. Reveles     Time of arrival: 7:00 am     Procedure time: 8:30 am     Published on [employer] and e-mail to Swapnil Wise RN will go over date/time and instructions during her office visit on 4/25.

## 2024-04-17 NOTE — PROGRESS NOTES
Taken to discharge bridge via wheelchair.  Patient discharged to home with family via private care.

## 2024-04-22 NOTE — PROGRESS NOTES
[facility]  Cardiology Consult    Albino Uriostegui  1965 April 25, 2024      Reason for Referral: Renal artery stenosis    Referring physician: Dr Reveles    CC: \"I am told that I have a blockage.\"      Subjective:     History of Present Illness:    Albino Uriostegui is a 58 y.o. patient with a PMH significant for coronary artery disease s/p CABG,10/30/2017 CABGX6 (LIMA-LAD, SVG-D2-D1, SVG-OM1-OM2, SVG-PDA)  s/p stent, diabetes, hypertension, hyperlipidemia, hypothyroidism, and renal artery stenosis.   Today, she is here to discuss renal artery stenosis. She states that he blood pressure has been well controlled as far as she know. She is hoping that she will just feel better and not have pain in her legs. Patient denies exertional chest pain/pressure, dyspnea at rest, HERNANDEZ, PND, orthopnea, palpitations, lightheadedness, weight changes, changes in LE edema, and syncope.     Past Medical History:   has a past medical history of CAD (coronary artery disease), Chest pain, Depression, Diabetes mellitus (HCC), Family history of early CAD, Female pelvic pain, HTN (hypertension), Hyperlipidemia, Hypothyroidism, Rectal bleeding, and Tobacco user.    Surgical History:   has a past surgical history that includes Cholecystectomy; Hysterectomy; Coronary angioplasty with stent; Coronary artery bypass graft (10/30/2017); and Coronary artery bypass graft.     Social History:   reports that she quit smoking about 6 years ago. Her smoking use included cigarettes. She started smoking about 26 years ago. She has a 5.0 pack-year smoking history. She has never used smokeless tobacco. She reports that she does not currently use alcohol. She reports that she does not use drugs.     Family History:  family history includes Heart Disease in her brother, brother, and father; Heart Disease (age of onset: 60) in her mother.    Home Medications:  Were reviewed and are listed in nursing record and/or below  Prior to Admission medications

## 2024-04-24 NOTE — DISCHARGE SUMMARY
[facility]    DISCHARGE SUMMARY      Patient ID:  Albino Uriostegui  [id number] 58 y.o. 1965    Admit date: 4/2/2024    Discharge date: 4/3/2024     Admitting Physician: Dustin Reveles MD     Discharge Physician: Dustin Reveles MD     Admission Diagnoses: Ischemic foot [I99.8]  Claudication, class IV ([facility]) [I73.9]    Discharge Diagnoses:   Patient Active Problem List   Diagnosis    HTN (hypertension)    Hyperlipidemia    Tobacco user    Family history of early CAD    Hypothyroidism    S/P angioplasty with stent    CAD (coronary artery disease)    Unstable angina (HCC)    Coronary artery disease involving native coronary artery of native heart without angina pectoris    Essential hypertension    Dyslipidemia    Diabetes mellitus (HCC)    Tobacco abuse    Tobacco abuse counseling    Contraindication to percutaneous coronary intervention (PCI)    NSTEMI (non-ST elevated myocardial infarction) ([facility])    S/P CABG (coronary artery bypass graft)    Chest pain    Elevated troponin I level    Acute on chronic diastolic heart failure (HCC)    Ischemic foot    Claudication, class IV ([facility])        Discharged Condition: good    Hospital Course: Albino Uriostegui was admitted for acute limb ischemia treated with percutaneous intervention    Consults: none    Significant Diagnostic Studies:   Labs:   Lab Results   Component Value Date    CREATININE 1.1 04/11/2024    BUN 26 (H) 04/11/2024     04/11/2024    K 4.7 04/11/2024     04/11/2024    CO2 27 04/11/2024      Lab Results   Component Value Date    WBC 9.8 04/11/2024    HGB 12.8 04/11/2024    HCT 38.5 04/11/2024    MCV 86.4 04/11/2024     04/11/2024      Lab Results   Component Value Date    INR 1.19 (H) 11/08/2017    PROTIME 13.4 (H) 11/08/2017    No results found for: \"BNP\"      Disposition: home    Patient Instructions:      Medication List        Change how you take these medications      aspirin EC 81 MG EC tablet  Take 1 tablet by

## 2024-04-25 ENCOUNTER — OFFICE VISIT (OUTPATIENT)
Dept: CARDIOLOGY CLINIC | Age: 59
End: 2024-04-25
Payer: COMMERCIAL

## 2024-04-25 VITALS
BODY MASS INDEX: 28.04 KG/M2 | HEIGHT: 68 IN | DIASTOLIC BLOOD PRESSURE: 62 MMHG | HEART RATE: 75 BPM | SYSTOLIC BLOOD PRESSURE: 118 MMHG | WEIGHT: 185 LBS | OXYGEN SATURATION: 99 %

## 2024-04-25 DIAGNOSIS — I25.10 CORONARY ARTERY DISEASE INVOLVING NATIVE CORONARY ARTERY OF NATIVE HEART WITHOUT ANGINA PECTORIS: ICD-10-CM

## 2024-04-25 DIAGNOSIS — E78.2 MIXED HYPERLIPIDEMIA: ICD-10-CM

## 2024-04-25 DIAGNOSIS — I70.1 RENAL ARTERY STENOSIS (HCC): Primary | ICD-10-CM

## 2024-04-25 DIAGNOSIS — I73.9 PAD (PERIPHERAL ARTERY DISEASE) (HCC): ICD-10-CM

## 2024-04-25 DIAGNOSIS — I10 ESSENTIAL HYPERTENSION: ICD-10-CM

## 2024-04-25 PROCEDURE — 3078F DIAST BP <80 MM HG: CPT | Performed by: INTERNAL MEDICINE

## 2024-04-25 PROCEDURE — 99215 OFFICE O/P EST HI 40 MIN: CPT | Performed by: INTERNAL MEDICINE

## 2024-04-25 PROCEDURE — 3074F SYST BP LT 130 MM HG: CPT | Performed by: INTERNAL MEDICINE

## 2024-04-25 NOTE — PATIENT INSTRUCTIONS
Procedure scheduled for 5/15/2024 at 830  Arrive at 7:00     The morning of your procedure you will park in the hospital parking lot and report directly to the cath lab to check in.      Pre-Procedure Instructions   You will need to fast for at least 8 hours prior to procedure. No caffeine the morning of.    Hold all diabetic medications including, Metfomin.  If you take Lantus/Levemir only take ½ your normal dose the evening before.  All other medications can be taken in the morning with sips of water.   Do not use any lotions, creams or perfume the morning of procedure.   Pre-procedure lab work will need to be completed 5-7 days prior to procedure.   Please have a responsible adult to drive you home after procedure. We advise you have someone to stay with you for 24 hours following procedure for precautionary measures. Depending on procedure you may require an overnight stay.   Cath lab will provide you with all post procedure instructions.      If you have any questions regarding the procedure itself or medications, please call [phone] and ask to speak with a nurse. Fol

## 2024-05-06 DIAGNOSIS — I70.1 RENAL ARTERY STENOSIS (HCC): ICD-10-CM

## 2024-05-06 LAB
ANION GAP SERPL CALCULATED.3IONS-SCNC: 14 MMOL/L (ref 3–16)
BUN SERPL-MCNC: 21 MG/DL (ref 7–20)
CALCIUM SERPL-MCNC: 10.2 MG/DL (ref 8.3–10.6)
CHLORIDE SERPL-SCNC: 100 MMOL/L (ref 99–110)
CO2 SERPL-SCNC: 24 MMOL/L (ref 21–32)
CREAT SERPL-MCNC: 1 MG/DL (ref 0.6–1.1)
DEPRECATED RDW RBC AUTO: 14.5 % (ref 12.4–15.4)
GFR SERPLBLD CREATININE-BSD FMLA CKD-EPI: 65 ML/MIN/{1.73_M2}
GLUCOSE SERPL-MCNC: 240 MG/DL (ref 70–99)
HCT VFR BLD AUTO: 41.5 % (ref 36–48)
HGB BLD-MCNC: 13.9 G/DL (ref 12–16)
MCH RBC QN AUTO: 28.7 PG (ref 26–34)
MCHC RBC AUTO-ENTMCNC: 33.5 G/DL (ref 31–36)
MCV RBC AUTO: 85.5 FL (ref 80–100)
PLATELET # BLD AUTO: 291 K/UL (ref 135–450)
PMV BLD AUTO: 8.1 FL (ref 5–10.5)
POTASSIUM SERPL-SCNC: 4.8 MMOL/L (ref 3.5–5.1)
RBC # BLD AUTO: 4.85 M/UL (ref 4–5.2)
SODIUM SERPL-SCNC: 138 MMOL/L (ref 136–145)
WBC # BLD AUTO: 9.6 K/UL (ref 4–11)

## 2024-05-15 ENCOUNTER — HOSPITAL ENCOUNTER (OUTPATIENT)
Age: 59
Setting detail: OUTPATIENT SURGERY
Discharge: HOME | End: 2024-05-15
Attending: STUDENT IN AN ORGANIZED HEALTH CARE EDUCATION/TRAINING PROGRAM | Admitting: STUDENT IN AN ORGANIZED HEALTH CARE EDUCATION/TRAINING PROGRAM
Payer: COMMERCIAL

## 2024-05-15 VITALS
HEART RATE: 68 BPM | OXYGEN SATURATION: 97 % | RESPIRATION RATE: 22 BRPM | WEIGHT: 177 LBS | SYSTOLIC BLOOD PRESSURE: 129 MMHG | BODY MASS INDEX: 26.83 KG/M2 | HEIGHT: 68 IN | TEMPERATURE: 98.2 F | DIASTOLIC BLOOD PRESSURE: 61 MMHG

## 2024-05-15 DIAGNOSIS — I73.9 PAD (PERIPHERAL ARTERY DISEASE) (HCC): ICD-10-CM

## 2024-05-15 LAB — ECHO BSA: 1.96 M2

## 2024-05-15 PROCEDURE — C1887 CATHETER, GUIDING: HCPCS | Performed by: STUDENT IN AN ORGANIZED HEALTH CARE EDUCATION/TRAINING PROGRAM

## 2024-05-15 PROCEDURE — C1753 CATH, INTRAVAS ULTRASOUND: HCPCS | Performed by: STUDENT IN AN ORGANIZED HEALTH CARE EDUCATION/TRAINING PROGRAM

## 2024-05-15 PROCEDURE — 7100000010 HC PHASE II RECOVERY - FIRST 15 MIN: Performed by: STUDENT IN AN ORGANIZED HEALTH CARE EDUCATION/TRAINING PROGRAM

## 2024-05-15 PROCEDURE — 37252 INTRVASC US NONCORONARY 1ST: CPT | Performed by: STUDENT IN AN ORGANIZED HEALTH CARE EDUCATION/TRAINING PROGRAM

## 2024-05-15 PROCEDURE — 6370000000 HC RX 637 (ALT 250 FOR IP)

## 2024-05-15 PROCEDURE — C1894 INTRO/SHEATH, NON-LASER: HCPCS | Performed by: STUDENT IN AN ORGANIZED HEALTH CARE EDUCATION/TRAINING PROGRAM

## 2024-05-15 PROCEDURE — 99152 MOD SED SAME PHYS/QHP 5/>YRS: CPT | Performed by: STUDENT IN AN ORGANIZED HEALTH CARE EDUCATION/TRAINING PROGRAM

## 2024-05-15 PROCEDURE — 2709999900 HC NON-CHARGEABLE SUPPLY: Performed by: STUDENT IN AN ORGANIZED HEALTH CARE EDUCATION/TRAINING PROGRAM

## 2024-05-15 PROCEDURE — 2500000003 HC RX 250 WO HCPCS: Performed by: STUDENT IN AN ORGANIZED HEALTH CARE EDUCATION/TRAINING PROGRAM

## 2024-05-15 PROCEDURE — 6370000000 HC RX 637 (ALT 250 FOR IP): Performed by: STUDENT IN AN ORGANIZED HEALTH CARE EDUCATION/TRAINING PROGRAM

## 2024-05-15 PROCEDURE — 6360000002 HC RX W HCPCS: Performed by: STUDENT IN AN ORGANIZED HEALTH CARE EDUCATION/TRAINING PROGRAM

## 2024-05-15 PROCEDURE — 7100000011 HC PHASE II RECOVERY - ADDTL 15 MIN: Performed by: STUDENT IN AN ORGANIZED HEALTH CARE EDUCATION/TRAINING PROGRAM

## 2024-05-15 PROCEDURE — 99153 MOD SED SAME PHYS/QHP EA: CPT | Performed by: STUDENT IN AN ORGANIZED HEALTH CARE EDUCATION/TRAINING PROGRAM

## 2024-05-15 PROCEDURE — 37236 OPEN/PERQ PLACE STENT 1ST: CPT | Performed by: STUDENT IN AN ORGANIZED HEALTH CARE EDUCATION/TRAINING PROGRAM

## 2024-05-15 PROCEDURE — C1876 STENT, NON-COA/NON-COV W/DEL: HCPCS | Performed by: STUDENT IN AN ORGANIZED HEALTH CARE EDUCATION/TRAINING PROGRAM

## 2024-05-15 PROCEDURE — 36251 INS CATH REN ART 1ST UNILAT: CPT | Performed by: STUDENT IN AN ORGANIZED HEALTH CARE EDUCATION/TRAINING PROGRAM

## 2024-05-15 PROCEDURE — C1769 GUIDE WIRE: HCPCS | Performed by: STUDENT IN AN ORGANIZED HEALTH CARE EDUCATION/TRAINING PROGRAM

## 2024-05-15 PROCEDURE — C1725 CATH, TRANSLUMIN NON-LASER: HCPCS | Performed by: STUDENT IN AN ORGANIZED HEALTH CARE EDUCATION/TRAINING PROGRAM

## 2024-05-15 PROCEDURE — 2580000003 HC RX 258: Performed by: STUDENT IN AN ORGANIZED HEALTH CARE EDUCATION/TRAINING PROGRAM

## 2024-05-15 PROCEDURE — 6360000004 HC RX CONTRAST MEDICATION: Performed by: STUDENT IN AN ORGANIZED HEALTH CARE EDUCATION/TRAINING PROGRAM

## 2024-05-15 RX ORDER — ACETAMINOPHEN 325 MG/1
650 TABLET ORAL EVERY 4 HOURS PRN
Status: DISCONTINUED | OUTPATIENT
Start: 2024-05-15 | End: 2024-05-15 | Stop reason: HOSPADM

## 2024-05-15 RX ORDER — DIPHENHYDRAMINE HYDROCHLORIDE 50 MG/ML
INJECTION INTRAMUSCULAR; INTRAVENOUS PRN
Status: DISCONTINUED | OUTPATIENT
Start: 2024-05-15 | End: 2024-05-15 | Stop reason: HOSPADM

## 2024-05-15 RX ORDER — SODIUM CHLORIDE 9 MG/ML
INJECTION, SOLUTION INTRAVENOUS PRN
Status: DISCONTINUED | OUTPATIENT
Start: 2024-05-15 | End: 2024-05-15 | Stop reason: HOSPADM

## 2024-05-15 RX ORDER — CLOPIDOGREL BISULFATE 75 MG/1
75 TABLET ORAL ONCE
Status: COMPLETED | OUTPATIENT
Start: 2024-05-15 | End: 2024-05-15

## 2024-05-15 RX ORDER — SODIUM CHLORIDE 0.9 % (FLUSH) 0.9 %
5-40 SYRINGE (ML) INJECTION PRN
Status: DISCONTINUED | OUTPATIENT
Start: 2024-05-15 | End: 2024-05-15 | Stop reason: HOSPADM

## 2024-05-15 RX ORDER — SODIUM CHLORIDE 9 MG/ML
INJECTION, SOLUTION INTRAVENOUS PRN
Status: DISCONTINUED | OUTPATIENT
Start: 2024-05-15 | End: 2024-05-15

## 2024-05-15 RX ORDER — SODIUM CHLORIDE 0.9 % (FLUSH) 0.9 %
5-40 SYRINGE (ML) INJECTION EVERY 12 HOURS SCHEDULED
Status: DISCONTINUED | OUTPATIENT
Start: 2024-05-15 | End: 2024-05-15 | Stop reason: HOSPADM

## 2024-05-15 RX ORDER — SODIUM CHLORIDE 0.9 % (FLUSH) 0.9 %
5-40 SYRINGE (ML) INJECTION EVERY 12 HOURS SCHEDULED
Status: DISCONTINUED | OUTPATIENT
Start: 2024-05-15 | End: 2024-05-15

## 2024-05-15 RX ORDER — HEPARIN SODIUM 1000 [USP'U]/ML
INJECTION, SOLUTION INTRAVENOUS; SUBCUTANEOUS PRN
Status: DISCONTINUED | OUTPATIENT
Start: 2024-05-15 | End: 2024-05-15 | Stop reason: HOSPADM

## 2024-05-15 RX ORDER — MIDAZOLAM HYDROCHLORIDE 1 MG/ML
INJECTION INTRAMUSCULAR; INTRAVENOUS PRN
Status: DISCONTINUED | OUTPATIENT
Start: 2024-05-15 | End: 2024-05-15 | Stop reason: HOSPADM

## 2024-05-15 RX ORDER — SODIUM CHLORIDE 0.9 % (FLUSH) 0.9 %
5-40 SYRINGE (ML) INJECTION PRN
Status: DISCONTINUED | OUTPATIENT
Start: 2024-05-15 | End: 2024-05-15

## 2024-05-15 RX ORDER — ASPIRIN 81 MG/1
81 TABLET, CHEWABLE ORAL ONCE
Status: COMPLETED | OUTPATIENT
Start: 2024-05-15 | End: 2024-05-15

## 2024-05-15 RX ORDER — FENTANYL CITRATE 50 UG/ML
INJECTION, SOLUTION INTRAMUSCULAR; INTRAVENOUS PRN
Status: DISCONTINUED | OUTPATIENT
Start: 2024-05-15 | End: 2024-05-15 | Stop reason: HOSPADM

## 2024-05-15 RX ADMIN — ASPIRIN 81 MG 81 MG: 81 TABLET ORAL at 07:37

## 2024-05-15 RX ADMIN — CLOPIDOGREL BISULFATE 75 MG: 75 TABLET ORAL at 07:32

## 2024-05-15 RX ADMIN — Medication 10 ML: at 07:37

## 2024-05-15 RX ADMIN — SODIUM CHLORIDE 75 ML/HR: 9 INJECTION, SOLUTION INTRAVENOUS at 07:33

## 2024-05-15 NOTE — DISCHARGE INSTRUCTIONS
PERIPHERAL ANGIOGRAM    Care of your puncture site:  Remove bandage 24 hours after the procedure. You may remove the dressing from the right wrist at 0900 tomorrow  May shower in 24 hours but do not sit in a bathtub/pool of water for 5 days or until the wound is healed.  Gently clean groin using soap and water.  Dry thoroughly and apply a Band-Aid that covers the entire site. Use Band-Aid until skin heals over in about 3-5 days.  Do not apply powder or lotion.      Normal Observations:  Soreness or tenderness which may last one week.  Mild oozing from the incision site.  Possible bruising that could last 2 weeks.    Activity:  You may resume driving 24 hours following the procedure.  Do not make important / legal decisions within 24 hours after procedure.  You may resume normal activity in 3 days or after the wound heals.  Avoid lifting with your right hand / wrist  more than 10 pounds for 3 days or until the wound heals.  Avoid strenuous exercise or activity for 1 week.      Nutrition:  Heart Healthy diet low sodium, low cholesterol, low fat diet  Drink at least 8 to 10 glasses of decaffeinated, non-alcoholic fluid for the next 24 hours to flush the x-ray dye used for your angiogram out of your body.    Call your doctor immediately if your condition worsens, for any other concerns, for a follow-up appointment or if you experience any of the following:  Increased swelling on the right hand,wrist or arm  Unusual pain, numbness, or tingling of the hand wrist or arm  Any signs of infection such as: redness, yellow drainage at the site, swelling or pain.    IF  Right wrist STARTS BLEEDING SIGNIFICANTLY:   HOLD FIRM DIRECT PRESSURE, AND CALL 911    Other Instructions:

## 2024-05-15 NOTE — H&P
STENOSIS     4.  The circumflex was given rise to several OMs and PDA and had luminal irregularities. There is MIKE 3 flow.                100% STENOSIS     LIMA to LAD patent  SVG to OM patent  SVF to right PDA patent  Other imaging:      Assessment and Plan      Renal artery stenosis  With cardiac destabilization hx with HTN on 3 anti hypertensive   Will schedule for renal artery intervention. I have explained to her the risks and benefits and she is willing to proceed.      Peripheral arterial disease  Symptoms improved since intervention. Continue Asa, Plavix, and statin therapy.      Coronary artery disease  S/p CABG. Asymptomatic. Continue Asa, Plavix, B-blocker and statin therapy.      Hypertension  Controlled. Continue current medical management.      Hyperlipidemia  Continue statin therapy.      Follow up after procedure.      Thank you for allowing us to participate in the care of Albino Uriostegui.  Please do not hesitate to contact me if you have any questions.     Zane Correa MD, MPH     [facility] [place]  [address], [address]   [place] [address]  Ph: [phone]  Fax: [phone]

## 2024-05-15 NOTE — FLOWSHEET NOTE
Patient returned to post cath lab room drowsy but awakens to name. Right radial TR band intact with no bleeding or hematoma.  at bedside. Sips of water given. Call light within reach.IV site at left antecubital remains without redness or swelling.    1230 Discharge instructions reviewed with patient and  with verbalization of understanding.    1245 Discharged to car per wheelchair with  driving.

## 2024-05-27 LAB — ECHO BSA: 1.96 M2

## 2024-05-29 NOTE — PROGRESS NOTES
[facility]      Cardiology Progress Note     Albino Uriostegui  1965    May 31, 2024      CC: \"     HPI:  The patient is 58 y.o. female with a past medical history significant for coronary artery disease s/p CABG,10/30/2017 CABGX6 (LIMA-LAD, SVG-D2-D1, SVG-OM1-OM2, SVG-PDA)  s/p stent, diabetes, hypertension, hyperlipidemia, hypothyroidism who presented to the hospital 1/29/24 with complaints of left arm pain and squeezing sensation across her chest which happened on 1/29/2024 in the morning. She presented to the emergency room and her workup showed mildly elevated troponin leading to her admission and the consultation. She has not been experiencing any exertional chest tightness, shortness of breath orthopnea PND. LHC performed per Dr. Correa revealing ESPARZA to LAD patent, SVG to OM patent, SVF to R PDA patent. Started on Ranexa for diffuse distal disease. She was admitted to [facility] 2/2024 with unstable angina. She underwent coronary angiography which showed patent bypass graft with diffusely diseased native coronary arteries being treated medically. She was seen in follow up 3/8/24 and continued to report chest pains. She was started on Imdur.     3/29/24, she presented to the ED 3/16/24 with chest pain and completed an echocardiogram that showed normal LVEF.  Her coronary angiography was reviewed in detail at that time and it was felt that she should be treated medically she was treated medically and presents today for a follow up.  Since hospital discharge she denies having any further symptoms to suggest angina but she does complain of feeling dizzy when she takes her isosorbide in the morning    Bilateral calf pain. Abnormal BLE arterial duplex followed by peripheral angiography with revascularization of the LLE 4/2/24 followed by the RLE 4/24/24 per Dr. Reveles and Dr. Correa.     Interval history 5/31/2024  Patient is here for her follow-up.  She denies having any pain in the legs after her

## 2024-05-31 ENCOUNTER — OFFICE VISIT (OUTPATIENT)
Dept: CARDIOLOGY CLINIC | Age: 59
End: 2024-05-31
Payer: COMMERCIAL

## 2024-05-31 VITALS
DIASTOLIC BLOOD PRESSURE: 62 MMHG | HEART RATE: 78 BPM | HEIGHT: 68 IN | SYSTOLIC BLOOD PRESSURE: 98 MMHG | OXYGEN SATURATION: 98 % | WEIGHT: 176 LBS | BODY MASS INDEX: 26.67 KG/M2

## 2024-05-31 DIAGNOSIS — E78.5 HYPERLIPIDEMIA, UNSPECIFIED HYPERLIPIDEMIA TYPE: ICD-10-CM

## 2024-05-31 DIAGNOSIS — I25.810 CORONARY ARTERY DISEASE INVOLVING CORONARY BYPASS GRAFT OF NATIVE HEART WITHOUT ANGINA PECTORIS: Primary | ICD-10-CM

## 2024-05-31 DIAGNOSIS — I73.9 PAD (PERIPHERAL ARTERY DISEASE) (HCC): ICD-10-CM

## 2024-05-31 DIAGNOSIS — I73.9 CLAUDICATION (HCC): ICD-10-CM

## 2024-05-31 DIAGNOSIS — I10 ESSENTIAL HYPERTENSION: ICD-10-CM

## 2024-05-31 DIAGNOSIS — E11.69 TYPE 2 DIABETES MELLITUS WITH OTHER SPECIFIED COMPLICATION, WITHOUT LONG-TERM CURRENT USE OF INSULIN (HCC): ICD-10-CM

## 2024-05-31 PROCEDURE — 3046F HEMOGLOBIN A1C LEVEL >9.0%: CPT | Performed by: INTERNAL MEDICINE

## 2024-05-31 PROCEDURE — 3078F DIAST BP <80 MM HG: CPT | Performed by: INTERNAL MEDICINE

## 2024-05-31 PROCEDURE — 3074F SYST BP LT 130 MM HG: CPT | Performed by: INTERNAL MEDICINE

## 2024-05-31 PROCEDURE — 99214 OFFICE O/P EST MOD 30 MIN: CPT | Performed by: INTERNAL MEDICINE

## 2024-05-31 RX ORDER — LISINOPRIL 5 MG/1
5 TABLET ORAL DAILY
Qty: 90 TABLET | Refills: 3 | Status: SHIPPED | OUTPATIENT
Start: 2024-05-31

## 2024-06-04 ENCOUNTER — TELEPHONE (OUTPATIENT)
Dept: CARDIOLOGY CLINIC | Age: 59
End: 2024-06-04

## 2024-06-04 NOTE — TELEPHONE ENCOUNTER
Appt scheduled for 7/8 at 10:30 a.m. patient stated that she needed to schedule a f/u post procedure. Is 7/8 suitable for post f/u?     Please advise.    Albino's callback: [phone]

## 2024-06-11 DIAGNOSIS — I25.810 CORONARY ARTERY DISEASE INVOLVING CORONARY BYPASS GRAFT OF NATIVE HEART WITHOUT ANGINA PECTORIS: ICD-10-CM

## 2024-06-11 DIAGNOSIS — E78.5 HYPERLIPIDEMIA, UNSPECIFIED HYPERLIPIDEMIA TYPE: ICD-10-CM

## 2024-06-11 DIAGNOSIS — I73.9 PAD (PERIPHERAL ARTERY DISEASE) (HCC): ICD-10-CM

## 2024-06-11 LAB
ALBUMIN SERPL-MCNC: 3.7 G/DL (ref 3.4–5)
ALBUMIN/GLOB SERPL: 1.3 {RATIO} (ref 1.1–2.2)
ALP SERPL-CCNC: 119 U/L (ref 40–129)
ALT SERPL-CCNC: 9 U/L (ref 10–40)
ANION GAP SERPL CALCULATED.3IONS-SCNC: 12 MMOL/L (ref 3–16)
AST SERPL-CCNC: 12 U/L (ref 15–37)
BILIRUB SERPL-MCNC: <0.2 MG/DL (ref 0–1)
BUN SERPL-MCNC: 23 MG/DL (ref 7–20)
CALCIUM SERPL-MCNC: 9.8 MG/DL (ref 8.3–10.6)
CHLORIDE SERPL-SCNC: 104 MMOL/L (ref 99–110)
CHOLEST SERPL-MCNC: 128 MG/DL (ref 0–199)
CO2 SERPL-SCNC: 23 MMOL/L (ref 21–32)
CREAT SERPL-MCNC: 0.9 MG/DL (ref 0.6–1.1)
DEPRECATED RDW RBC AUTO: 15.8 % (ref 12.4–15.4)
GFR SERPLBLD CREATININE-BSD FMLA CKD-EPI: 74 ML/MIN/{1.73_M2}
GLUCOSE SERPL-MCNC: 333 MG/DL (ref 70–99)
HCT VFR BLD AUTO: 43.1 % (ref 36–48)
HDLC SERPL-MCNC: 32 MG/DL (ref 40–60)
HGB BLD-MCNC: 14.3 G/DL (ref 12–16)
LDL CHOLESTEROL: 57 MG/DL
MCH RBC QN AUTO: 28.3 PG (ref 26–34)
MCHC RBC AUTO-ENTMCNC: 33.1 G/DL (ref 31–36)
MCV RBC AUTO: 85.5 FL (ref 80–100)
PLATELET # BLD AUTO: 263 K/UL (ref 135–450)
PMV BLD AUTO: 8.6 FL (ref 5–10.5)
POTASSIUM SERPL-SCNC: 4.7 MMOL/L (ref 3.5–5.1)
PROT SERPL-MCNC: 6.6 G/DL (ref 6.4–8.2)
RBC # BLD AUTO: 5.03 M/UL (ref 4–5.2)
SODIUM SERPL-SCNC: 139 MMOL/L (ref 136–145)
TRIGL SERPL-MCNC: 193 MG/DL (ref 0–150)
VLDLC SERPL CALC-MCNC: 39 MG/DL
WBC # BLD AUTO: 10.1 K/UL (ref 4–11)

## 2024-06-14 ENCOUNTER — OFFICE VISIT (OUTPATIENT)
Dept: CARDIOLOGY CLINIC | Age: 59
End: 2024-06-14
Payer: COMMERCIAL

## 2024-06-14 VITALS
DIASTOLIC BLOOD PRESSURE: 64 MMHG | HEIGHT: 68 IN | SYSTOLIC BLOOD PRESSURE: 118 MMHG | OXYGEN SATURATION: 97 % | WEIGHT: 171 LBS | HEART RATE: 84 BPM | BODY MASS INDEX: 25.91 KG/M2

## 2024-06-14 DIAGNOSIS — I73.9 PAD (PERIPHERAL ARTERY DISEASE) (HCC): ICD-10-CM

## 2024-06-14 DIAGNOSIS — I25.810 CORONARY ARTERY DISEASE INVOLVING CORONARY BYPASS GRAFT OF NATIVE HEART WITHOUT ANGINA PECTORIS: Primary | ICD-10-CM

## 2024-06-14 DIAGNOSIS — I10 ESSENTIAL HYPERTENSION: ICD-10-CM

## 2024-06-14 DIAGNOSIS — I73.9 CLAUDICATION (HCC): ICD-10-CM

## 2024-06-14 DIAGNOSIS — E11.69 TYPE 2 DIABETES MELLITUS WITH OTHER SPECIFIED COMPLICATION, WITHOUT LONG-TERM CURRENT USE OF INSULIN (HCC): ICD-10-CM

## 2024-06-14 PROCEDURE — 3078F DIAST BP <80 MM HG: CPT | Performed by: INTERNAL MEDICINE

## 2024-06-14 PROCEDURE — 3074F SYST BP LT 130 MM HG: CPT | Performed by: INTERNAL MEDICINE

## 2024-06-14 PROCEDURE — 99214 OFFICE O/P EST MOD 30 MIN: CPT | Performed by: INTERNAL MEDICINE

## 2024-06-14 PROCEDURE — 3046F HEMOGLOBIN A1C LEVEL >9.0%: CPT | Performed by: INTERNAL MEDICINE

## 2024-06-14 NOTE — PROGRESS NOTES
[facility]      Cardiology Progress Note     Albino Uriostegui  1965 June 14, 2024      CC: \"     HPI:  The patient is 58 y.o. female with a past medical history significant for coronary artery disease s/p CABG,10/30/2017 CABGX6 (LIMA-LAD, SVG-D2-D1, SVG-OM1-OM2, SVG-PDA)  s/p stent, diabetes, hypertension, hyperlipidemia, hypothyroidism who presented to the hospital 1/29/24 with complaints of left arm pain and squeezing sensation across her chest which happened on 1/29/2024 in the morning. She presented to the emergency room and her workup showed mildly elevated troponin leading to her admission and the consultation. She has not been experiencing any exertional chest tightness, shortness of breath orthopnea PND. LHC performed per Dr. Correa revealing ESPARZA to LAD patent, SVG to OM patent, SVF to R PDA patent. Started on Ranexa for diffuse distal disease. She was admitted to [facility] 2/2024 with unstable angina. She underwent coronary angiography which showed patent bypass graft with diffusely diseased native coronary arteries being treated medically. She was seen in follow up 3/8/24 and continued to report chest pains. She was started on Imdur.     3/29/24, she presented to the ED 3/16/24 with chest pain and completed an echocardiogram that showed normal LVEF.  Her coronary angiography was reviewed in detail at that time and it was felt that she should be treated medically she was treated medically and presents today for a follow up.  Since hospital discharge she denies having any further symptoms to suggest angina but she does complain of feeling dizzy when she takes her isosorbide in the morning    Bilateral calf pain. Abnormal BLE arterial duplex followed by peripheral angiography with revascularization of the LLE 4/2/24 followed by the RLE 4/24/24 per Dr. Reveles and Dr. Correa.     5/31/2024: Patient is here for her follow-up.  She denies having any pain in the legs after her peripheral vascular 
Heart Disease Brother         \"hole in his heart\"     Social History     Tobacco Use    Smoking status: Former     Current packs/day: 0.00     Average packs/day: 0.3 packs/day for 20.0 years (5.0 ttl pk-yrs)     Types: Cigarettes     Start date: 10/23/1997     Quit date: 10/23/2017     Years since quittin.6    Smokeless tobacco: Never    Tobacco comments:     trying to cut back    Vaping Use    Vaping Use: Never used   Substance Use Topics    Alcohol use: Not Currently     Comment: seldom-once a year     Drug use: No       No Known Allergies  Current Outpatient Medications   Medication Sig Dispense Refill    ranolazine (RANEXA) 1000 MG extended release tablet Take 1 tablet by mouth 2 times daily 60 tablet 3    empagliflozin (JARDIANCE) 10 MG tablet Take 1 tablet by mouth daily 30 tablet 3    metoprolol succinate (TOPROL XL) 50 MG extended release tablet Take 1 tablet by mouth at bedtime 30 tablet 3    clopidogrel (PLAVIX) 75 MG tablet Take 1 tablet by mouth daily 30 tablet 3    nitroGLYCERIN (NITROSTAT) 0.4 MG SL tablet Place 1 tablet under the tongue every 5 minutes as needed for Chest pain up to max of 3 total doses. If no relief after 1 dose, call 911. 25 tablet 3    liothyronine (CYTOMEL) 25 MCG tablet Take 1 tablet by mouth daily Takes at 6AM      vitamin D (ERGOCALCIFEROL) 1.25 MG (60199 UT) CAPS capsule Take 1 capsule by mouth Twice a Week      Dulaglutide 0.75 MG/0.5ML SOPN       insulin aspart (NOVOLOG FLEXPEN) 100 UNIT/ML injection pen Inject 3-6 Units into the skin 3 times daily (before meals)      TRESIBA FLEXTOUCH 200 UNIT/ML SOPN Inject 30 Units into the skin daily      EUTHYROX 112 MCG tablet Take 1 tablet by mouth Daily Takes at 6AM      vilazodone HCl (VILAZODONE HCL) 40 MG TABS 1 tablet nightly Takes at 10 PM      aspirin 81 MG EC tablet Take 1 tablet by mouth daily (Patient taking differently: Take 1 tablet by mouth nightly) 90 tablet 0    rosuvastatin (CRESTOR) 40 MG tablet Take 1 tablet

## 2024-07-05 NOTE — PROGRESS NOTES
[facility]      Cardiology Consult    Albino Uriostegui  1965 July 5, 2024    Referring Physician: Jade Unger APRN  Reason for Referral: Left foot swelling     CC: \"Feel Fine\"    HPI:  The patient is 58 y.o. female with a past medical history significant for CADs/p CABG,10/30/2017 CABGX6 (LIMA-LAD, SVG-D2-D1, SVG-OM1-OM2, SVG-PDA) s/p stent,  chest pain, DM, HTN, Hyperlipidemia, renal artery stenosis.    Patient complaints of increased edema of left extremity.  Today she presents for follow up and states that overall she is feeling well. She denies any new sounding cardiac complaints. She denies any chest pains or worsening shortness of breath. She reports medication compliance and is tolerating. She denies any abnormal bleeding or bruising. She denies exertional chest pain/pressure, dyspnea at rest, worsening HERNANDEZ, PND, orthopnea, palpitations, lightheadedness, weight changes, changes in LE edema, and syncope.     Past Medical History:   Diagnosis Date    CAD (coronary artery disease)     multiple TIESHA    Chest pain     Nuc GXT 8/22/12 normal.  Echo 8/21/12 LVEF 60-65%    Depression     Diabetes mellitus (HCC)     managed by PCP    Family history of early CAD     Female pelvic pain 2009    HTN (hypertension)     controlled    Hyperlipidemia     rec semi annual lipids with LDL goal <70    Hypothyroidism     managed by PCP    Rectal bleeding 12/31/2014    Tobacco user     cessation discussed     Past Surgical History:   Procedure Laterality Date    CARDIAC PROCEDURE N/A 5/15/2024    Peripheral angiography performed by Dustin Reveles MD at [facility] CARDIAC CATH LAB    CHOLECYSTECTOMY      CORONARY ANGIOPLASTY WITH STENT PLACEMENT      9/7/12: TIESHA ramus/lst diag, mid circ, 5/8/15: TIESHA to mid and prox LAD and left circ, 8/24/16: TIESHA to prox circ, 9/29/16: staged PCI to distal LAD and PDA    CORONARY ARTERY BYPASS GRAFT  10/30/2017    cabgx6 ([facility])    CORONARY ARTERY BYPASS GRAFT      x 6

## 2024-07-08 ENCOUNTER — OFFICE VISIT (OUTPATIENT)
Dept: CARDIOLOGY CLINIC | Age: 59
End: 2024-07-08

## 2024-07-08 VITALS
HEART RATE: 76 BPM | WEIGHT: 170.6 LBS | DIASTOLIC BLOOD PRESSURE: 80 MMHG | SYSTOLIC BLOOD PRESSURE: 140 MMHG | BODY MASS INDEX: 25.85 KG/M2 | OXYGEN SATURATION: 97 % | HEIGHT: 68 IN

## 2024-07-08 DIAGNOSIS — I73.9 PAD (PERIPHERAL ARTERY DISEASE) (HCC): ICD-10-CM

## 2024-07-08 DIAGNOSIS — I89.0 LYMPHEDEMA: Primary | ICD-10-CM

## 2024-07-08 NOTE — PATIENT INSTRUCTIONS
Doppler study of veins if positive will need appt with Dr. Queen    Doppler study of arteries prior to 6 month appt with Dr. Reveles.

## 2024-07-17 ENCOUNTER — HOSPITAL ENCOUNTER (OUTPATIENT)
Dept: VASCULAR LAB | Age: 59
Discharge: HOME | End: 2024-07-19
Attending: STUDENT IN AN ORGANIZED HEALTH CARE EDUCATION/TRAINING PROGRAM
Payer: COMMERCIAL

## 2024-07-17 DIAGNOSIS — I89.0 LYMPHEDEMA: ICD-10-CM

## 2024-07-17 PROCEDURE — 93971 EXTREMITY STUDY: CPT | Performed by: SURGERY

## 2024-07-17 PROCEDURE — 93971 EXTREMITY STUDY: CPT

## 2024-07-19 RX ORDER — RANOLAZINE 1000 MG/1
1000 TABLET, EXTENDED RELEASE ORAL 2 TIMES DAILY
Qty: 180 TABLET | Refills: 4 | Status: SHIPPED | OUTPATIENT
Start: 2024-07-19

## 2024-07-19 NOTE — TELEPHONE ENCOUNTER
Last OV:  6/14/24  Next OV: 9/13/24  Last refill: 3/19/24  Most recent Labs: CMP 6/11/24  Last EKG (if needed):        
PRN)?  Take 1 tablet by mouth at bedtime     Do you want a 30 or 90 day supply? 90     When will you run out of your medication?  monday    Which Pharmacy are we sending this medication to?   [facility]  Address: [address] [place] [address]    Pharmacy Phone: [phone]   Pharmacy Fax:      Medication Refill    When was your last appointment with cardiology?    (If 1 yr or longer, please schedule appointment)    (If patient has been told they do not need to follow-up - medications should be filled by PCP)    When did you last have labs drawn?  06/11/2024    Medication needing refilled? clopidogrel (PLAVIX)     Dosage of the medication?  75 MG tablet     How are you taking this medication (QD, BID, TID, QID, PRN)?   Take 1 tablet by mouth daily     Do you want a 30 or 90 day supply? 90     When will you run out of your medication?  Monday    Which Pharmacy are we sending this medication to?  [facility]  Address: [address] [place] [address]    Pharmacy Phone: [phone]   Pharmacy Fax:

## 2024-07-24 ENCOUNTER — TELEPHONE (OUTPATIENT)
Dept: CARDIOLOGY CLINIC | Age: 59
End: 2024-07-24

## 2024-07-24 NOTE — TELEPHONE ENCOUNTER
Albino called in wanting the results of the venous that was done on 07/17/2024 on her left leg    Albino can be reached at [phone]

## 2024-07-25 NOTE — TELEPHONE ENCOUNTER
Per results from doppler study called patient with negative results for DVT.  No deep or superficial venous reflux.  Left message on voicemail.

## 2024-09-10 ENCOUNTER — TELEPHONE (OUTPATIENT)
Dept: CARDIOLOGY CLINIC | Age: 59
End: 2024-09-10

## 2024-09-10 DIAGNOSIS — I25.10 CORONARY ARTERY DISEASE INVOLVING NATIVE CORONARY ARTERY OF NATIVE HEART WITHOUT ANGINA PECTORIS: Primary | Chronic | ICD-10-CM

## 2024-09-10 DIAGNOSIS — E78.00 PURE HYPERCHOLESTEROLEMIA: ICD-10-CM

## 2024-09-10 DIAGNOSIS — I10 PRIMARY HYPERTENSION: ICD-10-CM

## 2024-09-11 DIAGNOSIS — I25.10 CORONARY ARTERY DISEASE INVOLVING NATIVE CORONARY ARTERY OF NATIVE HEART WITHOUT ANGINA PECTORIS: Chronic | ICD-10-CM

## 2024-09-11 DIAGNOSIS — E78.00 PURE HYPERCHOLESTEROLEMIA: ICD-10-CM

## 2024-09-11 DIAGNOSIS — I10 PRIMARY HYPERTENSION: ICD-10-CM

## 2024-09-11 LAB
ALBUMIN SERPL-MCNC: 3.8 G/DL (ref 3.4–5)
ALBUMIN/GLOB SERPL: 1.3 {RATIO} (ref 1.1–2.2)
ALP SERPL-CCNC: 107 U/L (ref 40–129)
ALT SERPL-CCNC: 12 U/L (ref 10–40)
ANION GAP SERPL CALCULATED.3IONS-SCNC: 10 MMOL/L (ref 3–16)
AST SERPL-CCNC: 12 U/L (ref 15–37)
BILIRUB SERPL-MCNC: <0.2 MG/DL (ref 0–1)
BUN SERPL-MCNC: 22 MG/DL (ref 7–20)
CALCIUM SERPL-MCNC: 9.3 MG/DL (ref 8.3–10.6)
CHLORIDE SERPL-SCNC: 104 MMOL/L (ref 99–110)
CHOLEST SERPL-MCNC: 140 MG/DL (ref 0–199)
CO2 SERPL-SCNC: 26 MMOL/L (ref 21–32)
CREAT SERPL-MCNC: 0.8 MG/DL (ref 0.6–1.1)
DEPRECATED RDW RBC AUTO: 15.5 % (ref 12.4–15.4)
GFR SERPLBLD CREATININE-BSD FMLA CKD-EPI: 85 ML/MIN/{1.73_M2}
GLUCOSE P FAST SERPL-MCNC: 247 MG/DL (ref 70–99)
HCT VFR BLD AUTO: 46.3 % (ref 36–48)
HDLC SERPL-MCNC: 41 MG/DL (ref 40–60)
HGB BLD-MCNC: 15.3 G/DL (ref 12–16)
LDL CHOLESTEROL: ABNORMAL MG/DL
LDLC SERPL-MCNC: 74 MG/DL
MCH RBC QN AUTO: 29.2 PG (ref 26–34)
MCHC RBC AUTO-ENTMCNC: 32.9 G/DL (ref 31–36)
MCV RBC AUTO: 88.7 FL (ref 80–100)
PLATELET # BLD AUTO: 297 K/UL (ref 135–450)
PMV BLD AUTO: 8.4 FL (ref 5–10.5)
POTASSIUM SERPL-SCNC: 4.3 MMOL/L (ref 3.5–5.1)
PROT SERPL-MCNC: 6.7 G/DL (ref 6.4–8.2)
RBC # BLD AUTO: 5.22 M/UL (ref 4–5.2)
SODIUM SERPL-SCNC: 140 MMOL/L (ref 136–145)
TRIGL SERPL-MCNC: 482 MG/DL (ref 0–150)
VLDLC SERPL CALC-MCNC: ABNORMAL MG/DL
WBC # BLD AUTO: 9.5 K/UL (ref 4–11)

## 2024-09-13 ENCOUNTER — OFFICE VISIT (OUTPATIENT)
Dept: CARDIOLOGY CLINIC | Age: 59
End: 2024-09-13
Payer: COMMERCIAL

## 2024-09-13 VITALS
OXYGEN SATURATION: 97 % | WEIGHT: 167 LBS | HEART RATE: 74 BPM | DIASTOLIC BLOOD PRESSURE: 64 MMHG | HEIGHT: 68 IN | BODY MASS INDEX: 25.31 KG/M2 | SYSTOLIC BLOOD PRESSURE: 118 MMHG

## 2024-09-13 DIAGNOSIS — R07.9 CHEST PAIN, UNSPECIFIED TYPE: Primary | ICD-10-CM

## 2024-09-13 PROCEDURE — 3074F SYST BP LT 130 MM HG: CPT | Performed by: INTERNAL MEDICINE

## 2024-09-13 PROCEDURE — 99214 OFFICE O/P EST MOD 30 MIN: CPT | Performed by: INTERNAL MEDICINE

## 2024-09-13 PROCEDURE — 3078F DIAST BP <80 MM HG: CPT | Performed by: INTERNAL MEDICINE

## 2024-09-13 RX ORDER — FENOFIBRATE 145 MG/1
145 TABLET, COATED ORAL DAILY
Qty: 30 TABLET | Refills: 3 | Status: SHIPPED | OUTPATIENT
Start: 2024-09-13

## 2024-09-13 RX ORDER — PIOGLITAZONEHYDROCHLORIDE 30 MG/1
30 TABLET ORAL DAILY
COMMUNITY
Start: 2024-09-05

## 2024-11-03 NOTE — CONSULTS
Cardiology Consultation   Date: 3/17/2024  Admit Date:  3/16/2024  Reason for Consultation: chest discomfort  Consult Requesting Physician: Kyle Varela MD     Chief Complaint   Patient presents with    Chest Pain     Onset 1630 while sitting and talking to daughter, took 324 ASA and 1 nitro without relief.  Has history of open heart surgery 7 years ago and states has current blockage being followed by cards.     HPI: Albino Uriostegui is a 58 y.o. F h/o CAD s/p multiple PCI (6 stents 6-7 y ago, recent PCI 1/2024), DMT2, HTN presents with L sided chest pressure (like someone sitting on her chest) starting DOA. Radiation down her L axilla and L forearm. A/w some dyspnea. Started when sitting down and talking. Upon workup in ED, CT chest unremarkalbe, CXR unremarkable. Elizabeth slightly elevated at 28, 112. . EKG showed sinus 91 bpm with RBBB without ST-T wave changes nor pathologic Q waves. When given morphine, SLNTG and NG paste, symptom completely abated.      Past Medical History:   Diagnosis Date    CAD (coronary artery disease)     multiple TIESHA    Chest pain     Nuc GXT 8/22/12 normal.  Echo 8/21/12 LVEF 60-65%    Depression     Diabetes mellitus (HCC)     managed by PCP    Family history of early CAD     Female pelvic pain 2009    HTN (hypertension)     controlled    Hyperlipidemia     rec semi annual lipids with LDL goal <70    Hypothyroidism     managed by PCP    Rectal bleeding 12/31/2014    Tobacco user     cessation discussed        Past Surgical History:   Procedure Laterality Date    CHOLECYSTECTOMY      CORONARY ANGIOPLASTY WITH STENT PLACEMENT      9/7/12: TIESHA ramus/lst diag, mid circ, 5/8/15: TIESHA to mid and prox LAD and left circ, 8/24/16: TIESHA to prox circ, 9/29/16: staged PCI to distal LAD and PDA    CORONARY ARTERY BYPASS GRAFT  10/30/2017    cabgx6 (Love)    CORONARY ARTERY BYPASS GRAFT      x 6    HYSTERECTOMY (CERVIX STATUS UNKNOWN)         No Known Allergies    Social 
Clinical Pharmacy Note  Heparin Dosing Consult    Albino Uriostegui is a 58 y.o. female ordered heparin per CAD/STEMI/NSTEMI/UA/AFIB nomogram by mis Choe    Lab Results   Component Value Date/Time    ANTIXAUHEP <0.10 01/31/2024 07:12 PM      Lab Results   Component Value Date/Time    HGB 11.0 03/17/2024 06:03 AM    HCT 32.5 03/17/2024 06:03 AM     03/17/2024 06:03 AM    INR 1.19 11/08/2017 05:00 AM       Ht Readings from Last 1 Encounters:   03/16/24 1.727 m (5' 8\")        Wt Readings from Last 1 Encounters:   03/17/24 90.2 kg (198 lb 13.7 oz)        Assessment/Plan:  Initial bolus: 4000 units  Initial infusion rate: 1000 units/hr  Next anti-Xa:: 2000    Pharmacy will continue to monitor adjust heparin based on anti-Xa results using nomogram below:     CAD/STEMI/NSTEMI/UA/AFIB Heparin Nomogram     Initial Bolus: 60 units/kg Max Bolus: 4,000 units       Initial Rate: 12 units/kg/hr Max Initial Rate: 1,000 units/hr     anti-Xa Bolus Titration   < 0.1 Heparin 60 units/kg bolus Increase drip by 4 units/kg/hr   0.1 - 0.29 Heparin 30 units/kg bolus Increase drip by 2 units/kg/hr   0.3 - 0.7 No Bolus No Change   0.71 - 0.8 No Bolus Decrease drip by 1 units/kg/hr   0.81 - 0.99 No Bolus Decrease drip by 2 units/kg/hr   > 1 Hold Heparin for 1 hour Decrease drip by 3 units/kg/hr     Obtain anti-Xa 6 hours after initial bolus and 6 hours after any dose change until two consecutive therapeutic anti-Xa levels are achieved - then daily.   
Deepthi Quiroz(Attending)

## 2024-12-09 DIAGNOSIS — R07.9 CHEST PAIN, UNSPECIFIED TYPE: ICD-10-CM

## 2024-12-09 LAB
CHOLEST SERPL-MCNC: 196 MG/DL (ref 0–199)
HDLC SERPL-MCNC: 62 MG/DL (ref 40–60)
LDL CHOLESTEROL: 109 MG/DL
TRIGL SERPL-MCNC: 127 MG/DL (ref 0–150)
VLDLC SERPL CALC-MCNC: 25 MG/DL

## 2024-12-20 ENCOUNTER — TELEPHONE (OUTPATIENT)
Dept: CARDIOLOGY CLINIC | Age: 59
End: 2024-12-20

## 2024-12-20 NOTE — TELEPHONE ENCOUNTER
Attempted to call patient several times.  Typed letter in Epic for patient to call office for lab results as requested.

## 2024-12-30 ENCOUNTER — TELEPHONE (OUTPATIENT)
Dept: ADMINISTRATIVE | Age: 59
End: 2024-12-30

## 2024-12-30 DIAGNOSIS — E78.00 PURE HYPERCHOLESTEROLEMIA: Primary | ICD-10-CM

## 2024-12-30 NOTE — TELEPHONE ENCOUNTER
Submitted PA for Nexlizet 180-10MG tablets   Via CMM Key: [id number]  STATUS: Your PA has been resolved, no additional PA is required.      Follow up done daily; if no decision with in three days we will refax.  If another three days goes by with no decision will call the insurance for status.

## 2024-12-30 NOTE — TELEPHONE ENCOUNTER
Spoke with [facility] and the cost of Nexlizet is $1400 monthly.    When she ran the medication it states that \" is not covered under this plan\".   Anything further that can be done?

## 2024-12-30 NOTE — TELEPHONE ENCOUNTER
Is there pt assistance or a discount card since this is a brand drug and no generic version is available

## 2024-12-30 NOTE — TELEPHONE ENCOUNTER
Called patient left message to return call.    Patient can call the company who makes Nexlizet at ph-[phone]  They will work with patient's insurance and cost.

## 2025-01-07 NOTE — TELEPHONE ENCOUNTER
Called patient and relayed message from Crystal.  Patient verbalized and confirmed understanding. She will call company.

## 2025-01-08 ENCOUNTER — HOSPITAL ENCOUNTER (OUTPATIENT)
Dept: VASCULAR LAB | Age: 60
Discharge: HOME | End: 2025-01-10
Attending: STUDENT IN AN ORGANIZED HEALTH CARE EDUCATION/TRAINING PROGRAM
Payer: COMMERCIAL

## 2025-01-08 DIAGNOSIS — I73.9 PAD (PERIPHERAL ARTERY DISEASE) (HCC): ICD-10-CM

## 2025-01-08 LAB
VAS LEFT ABI: 0.8
VAS LEFT ARM BP: 120 MMHG
VAS LEFT ATA MID PSV: 20.4 CM/S
VAS LEFT CFA DIST PSV: 130 CM/S
VAS LEFT CFA PROX PSV: 119.1 CM/S
VAS LEFT DORSALIS PEDIS BP: 82 MMHG
VAS LEFT PERONEAL MID PSV: 54.8 CM/S
VAS LEFT PFA PROX PSV: 103 CM/S
VAS LEFT POP A DIST PSV: 104.5 CM/S
VAS LEFT POP A PROX PSV: 84.2 CM/S
VAS LEFT POP A PROX VEL RATIO: 1.43
VAS LEFT PTA BP: 102 MMHG
VAS LEFT PTA MID PSV: 85.5 CM/S
VAS LEFT SFA DIST PSV: 58.9 CM/S
VAS LEFT SFA DIST VEL RATIO: 0.42
VAS LEFT SFA MID PSV: 141.4 CM/S
VAS LEFT SFA MID VEL RATIO: 0.35
VAS LEFT SFA PROX PSV: 401 CM/S
VAS LEFT SFA PROX VEL RATIO: 3.08
VAS RIGHT ABI: 0.88
VAS RIGHT ARM BP: 128 MMHG
VAS RIGHT ATA MID PSV: 71.5 CM/S
VAS RIGHT CFA DIST PSV: 107.9 CM/S
VAS RIGHT CFA PROX PSV: 147.6 CM/S
VAS RIGHT DORSALIS PEDIS BP: 112 MMHG
VAS RIGHT PERONEAL MID PSV: 44.5 CM/S
VAS RIGHT PFA PROX PSV: 249 CM/S
VAS RIGHT POP A DIST PSV: 98.5 CM/S
VAS RIGHT POP A PROX PSV: 67.9 CM/S
VAS RIGHT POP A PROX VEL RATIO: 0.16
VAS RIGHT PTA BP: 92 MMHG
VAS RIGHT PTA MID PSV: 96.1 CM/S
VAS RIGHT SFA DIST PSV: 415.8 CM/S
VAS RIGHT SFA DIST VEL RATIO: 3.31
VAS RIGHT SFA MID PSV: 125.6 CM/S
VAS RIGHT SFA MID VEL RATIO: 1.2
VAS RIGHT SFA PROX PSV: 103.7 CM/S
VAS RIGHT SFA PROX VEL RATIO: 0.7

## 2025-01-08 PROCEDURE — 93925 LOWER EXTREMITY STUDY: CPT

## 2025-01-08 PROCEDURE — 93925 LOWER EXTREMITY STUDY: CPT | Performed by: SURGERY

## 2025-01-17 ENCOUNTER — OFFICE VISIT (OUTPATIENT)
Dept: CARDIOLOGY CLINIC | Age: 60
End: 2025-01-17
Payer: COMMERCIAL

## 2025-01-17 VITALS
BODY MASS INDEX: 24.55 KG/M2 | HEIGHT: 68 IN | DIASTOLIC BLOOD PRESSURE: 60 MMHG | HEART RATE: 92 BPM | OXYGEN SATURATION: 99 % | WEIGHT: 162 LBS | SYSTOLIC BLOOD PRESSURE: 112 MMHG

## 2025-01-17 DIAGNOSIS — I10 PRIMARY HYPERTENSION: ICD-10-CM

## 2025-01-17 DIAGNOSIS — E78.5 HYPERLIPIDEMIA, UNSPECIFIED HYPERLIPIDEMIA TYPE: ICD-10-CM

## 2025-01-17 DIAGNOSIS — I73.9 PAD (PERIPHERAL ARTERY DISEASE) (HCC): ICD-10-CM

## 2025-01-17 DIAGNOSIS — I25.810 CORONARY ARTERY DISEASE INVOLVING CORONARY BYPASS GRAFT OF NATIVE HEART WITHOUT ANGINA PECTORIS: Primary | ICD-10-CM

## 2025-01-17 PROCEDURE — 3074F SYST BP LT 130 MM HG: CPT | Performed by: INTERNAL MEDICINE

## 2025-01-17 PROCEDURE — 3078F DIAST BP <80 MM HG: CPT | Performed by: INTERNAL MEDICINE

## 2025-01-17 PROCEDURE — G2211 COMPLEX E/M VISIT ADD ON: HCPCS | Performed by: INTERNAL MEDICINE

## 2025-01-17 PROCEDURE — 99214 OFFICE O/P EST MOD 30 MIN: CPT | Performed by: INTERNAL MEDICINE

## 2025-01-17 PROCEDURE — 93000 ELECTROCARDIOGRAM COMPLETE: CPT | Performed by: INTERNAL MEDICINE

## 2025-01-17 NOTE — PROGRESS NOTES
--LDL is still not at goal in spite of Crestor at 40 mg daily.  Add nexlizet 180/10 mg daily and will repeat liver lipid profile in 3 months. UNABLE TO GET A HOLD OF PATIENT. Repeat lipid with [facility] 12/12/24 LDLc 81, . A1c 10.3 down from 12.1 9/4/24.   -we will repeat fasting liver/lipid between 3-4 months.     Essential Hypertension   -BP is stable today   -continue medical therapy  -Have told her to reduce Imdur to 30 mg daily  -12/12/24 BUN 25, glucose 284 otherwise CMP normal [facility]    Hyperlipidemia, unspecified   -11/20/2023 was markedly abnormal with LDL cholesterol of 147 in spite of on Crestor 40 mg daily  -Patient does admit to poor compliance with the medications  -Will repeat lipid profile and consider her for either PCSK9 inhibitor or Leqvio therapy  -LDL goal with her extensive coronary artery disease is between 55 and 70.  -Triglycerides 214 Prescribed Tricor   -12/9/24 LDLc 109, HDL 62 12/9/24--LDL is still not at goal in spite of Crestor at 40 mg daily.  Add nexlizet 180/10 mg daily and will repeat liver lipid profile in 3 months. UNABLE TO GET A HOLD OF PATIENT. Repeat lipid with [facility] 12/12/24 LDLc 81, . A1c 10.3 down from 12.1 9/4/24.   --She has obtained Nexlizet through the foundation as it was cost prohibitive.  -She will start Nexlizet therapy tonight.   -fasting liver/lipid between 3-4 months.     Peripheral Artery Disease managed per Dr. Reveles   Peripheral Arterial Disease  [place] 4 Claudication  - Continue antiplatelet therapy  - Continue high potency statin  -  1/8/25 arterial Doppler studies with GIACOMO's much improved. No plans for angiography at this time, repeat doppler in 6 months per Dr. Reveles.     Diabetes type II   Blood sugar remains uncontrolled  -She is being followed by endocrinology  -Hgb A1c 9.6 11/2023 [facility].   -HgbA1c 12.1 9/2024  -She will need an aggressive treatment for her uncontrolled diabetes to prevent progressive atherosclerotic

## 2025-01-17 NOTE — PATIENT INSTRUCTIONS
REPEAT FASTING LABS BETWEEN 3-4 MONTHS AFTER STARTING NEXLIZET.. ORDERS GIVEN. WE WILL CALL WITH RESULTS.

## 2025-01-28 ENCOUNTER — TELEPHONE (OUTPATIENT)
Dept: CARDIOLOGY CLINIC | Age: 60
End: 2025-01-28

## 2025-01-28 NOTE — TELEPHONE ENCOUNTER
Albino called wanting to schedule repeat doppler in 6 months. Can we put that order in?     Please assist.     Albino's callback: [phone]

## 2025-01-31 ENCOUNTER — TELEPHONE (OUTPATIENT)
Dept: CARDIOLOGY CLINIC | Age: 60
End: 2025-01-31

## 2025-01-31 DIAGNOSIS — I73.9 PAD (PERIPHERAL ARTERY DISEASE) (HCC): Primary | ICD-10-CM

## 2025-01-31 NOTE — TELEPHONE ENCOUNTER
Can you please call and schedule patient for Arterial doppler bilateral lower extremity at [facility] in 6 months.  Thank you

## 2025-01-31 NOTE — TELEPHONE ENCOUNTER
Per Dr. Reveles notes patient to have Doppler in 6 weeks for follow up.  Will place order for Arterial Doppler.

## 2025-03-11 RX ORDER — FENOFIBRATE 145 MG/1
145 TABLET, COATED ORAL DAILY
Qty: 30 TABLET | Refills: 3 | Status: SHIPPED | OUTPATIENT
Start: 2025-03-11

## 2025-03-11 NOTE — TELEPHONE ENCOUNTER
Last OV: 1/17/25  Next OV: 7/18/25  Last refill: 9/13/24 #30 3 R/F  Most recent Labs: 12/12/24  Last EKG (if needed): 1/17/25

## 2025-06-11 ENCOUNTER — TELEPHONE (OUTPATIENT)
Dept: CARDIOLOGY CLINIC | Age: 60
End: 2025-06-11

## 2025-06-11 DIAGNOSIS — I73.9 PAD (PERIPHERAL ARTERY DISEASE): Primary | ICD-10-CM

## 2025-06-11 DIAGNOSIS — M79.89 LEFT LEG SWELLING: ICD-10-CM

## 2025-06-11 DIAGNOSIS — I73.9 CLAUDICATION: ICD-10-CM

## 2025-06-11 DIAGNOSIS — L81.9 DISCOLORATION OF SKIN OF LOWER LEG: ICD-10-CM

## 2025-06-11 DIAGNOSIS — R29.898 LEFT LEG WEAKNESS: ICD-10-CM

## 2025-06-11 DIAGNOSIS — R25.2 CRAMPS OF LEFT LOWER EXTREMITY: ICD-10-CM

## 2025-06-11 NOTE — TELEPHONE ENCOUNTER
Last OV with Dr. Sy on 1/17/25.  Excerpt from office note:   Peripheral Artery Disease managed per Dr. Reveles   Peripheral Arterial Disease  [place] 4 Claudication  - Continue antiplatelet therapy  - Continue high potency statin  -  1/8/25 arterial Doppler studies with GIACOMO's much improved. No plans for angiography at this time, repeat doppler in 6 months per Dr. Reveles.     Vascular duplex scheduled for 7/31/25  OV with Dr. Reveles on 7/7/25    Called patient.   This pain is the exact same pain as prior to having procedures. Problems only in left leg, my right is doing really good.  Has been going for a week and getting progressively worse.  Has been propping her left leg up and no help.  Sometimes swelling goes down when I wake up and sometimes.  Sometimes warm by my ankle and turns purple gray color when it swells  No lump or redness. Sore today from cramps all night.  Not always sore and not always swollen.

## 2025-06-11 NOTE — TELEPHONE ENCOUNTER
Albino called to make an appt for leg issues that started last week. Patient reports swelling started a week ago, notes cramping every 10 minutes at night. Tingling and numbness, which usually occurs in her feet have now spread to her legs.     Albino mentioned Dr. Reveles had placed stents in her left leg previously and was unsure if she needed to see him and not Yossi.     Please assess symptoms.    Patient has an appt with Dr. Sy on 6/20 in [place].      Albino's callback: [phone]

## 2025-06-11 NOTE — TELEPHONE ENCOUNTER
With new acute symptoms. Lets go ahead and get a LLE venous duplex to r/o DVT and move up her BLE arterial duplex moved up from July. Both orders have been placed-move up arterial. No transportation today. Lives in [place].

## 2025-06-11 NOTE — TELEPHONE ENCOUNTER
Bailey and Dr. Reveles,   Please advise if symptoms warrant below testing or instructions moving forward?     (SMS message sent at 1:45 pm to check inbox.)

## 2025-06-12 ENCOUNTER — HOSPITAL ENCOUNTER (OUTPATIENT)
Dept: VASCULAR LAB | Age: 60
Discharge: HOME | End: 2025-06-14
Attending: INTERNAL MEDICINE
Payer: COMMERCIAL

## 2025-06-12 ENCOUNTER — TELEPHONE (OUTPATIENT)
Dept: CARDIOLOGY CLINIC | Age: 60
End: 2025-06-12

## 2025-06-12 DIAGNOSIS — I73.9 CLAUDICATION: Primary | ICD-10-CM

## 2025-06-12 DIAGNOSIS — R25.2 CRAMPS OF LEFT LOWER EXTREMITY: ICD-10-CM

## 2025-06-12 DIAGNOSIS — I73.9 PAD (PERIPHERAL ARTERY DISEASE): ICD-10-CM

## 2025-06-12 DIAGNOSIS — M79.89 LEFT LEG SWELLING: ICD-10-CM

## 2025-06-12 DIAGNOSIS — R29.898 LEFT LEG WEAKNESS: ICD-10-CM

## 2025-06-12 DIAGNOSIS — M79.605 LEG PAIN, LEFT: ICD-10-CM

## 2025-06-12 DIAGNOSIS — L81.9 DISCOLORATION OF SKIN OF LOWER LEG: ICD-10-CM

## 2025-06-12 DIAGNOSIS — I73.9 CLAUDICATION: ICD-10-CM

## 2025-06-12 LAB
VAS LEFT ABI: 0.53
VAS LEFT ATA DIST PSV: 0 CM/S
VAS LEFT ATA MID PSV: 0 CM/S
VAS LEFT ATA PROX PSV: 220 CM/S
VAS LEFT CFA DIST PSV: 128.3 CM/S
VAS LEFT CFA PROX PSV: 90.4 CM/S
VAS LEFT DORSALIS PEDIS BP: 52 MMHG
VAS LEFT PERONEAL DIST PSV: 40 CM/S
VAS LEFT PERONEAL MID PSV: 38 CM/S
VAS LEFT PERONEAL PROX PSV: 38 CM/S
VAS LEFT PFA PROX PSV: 126 CM/S
VAS LEFT POP A DIST PSV: 139.2 CM/S
VAS LEFT POP A PROX PSV: 55.2 CM/S
VAS LEFT POP A PROX VEL RATIO: 0.73
VAS LEFT PTA BP: 60 MMHG
VAS LEFT PTA DIST PSV: 22 CM/S
VAS LEFT PTA MID PSV: 43 CM/S
VAS LEFT PTA PROX PSV: 33 CM/S
VAS LEFT SFA DIST PSV: 76.1 CM/S
VAS LEFT SFA DIST VEL RATIO: 1.35
VAS LEFT SFA MID PSV: 56.3 CM/S
VAS LEFT SFA MID VEL RATIO: 0.48
VAS LEFT SFA PROX PSV: 117.3 CM/S
VAS LEFT SFA PROX VEL RATIO: 0.91
VAS RIGHT ABI: 0.81
VAS RIGHT ARM BP: 114 MMHG
VAS RIGHT DORSALIS PEDIS BP: 70 MMHG
VAS RIGHT PTA BP: 92 MMHG

## 2025-06-12 PROCEDURE — 93926 LOWER EXTREMITY STUDY: CPT

## 2025-06-12 NOTE — TELEPHONE ENCOUNTER
Date of Procedure: Wednesday 6/25/25 @ [facility] with Dr. Reveles     Time of arrival: 7:30 am     Procedure time: 9:00 am     Called and spoke to Albino and she is agreeable to date and time. Reviewed instructions and she verbalized understanding. Encouraged to call with any questions or concerns.     Published on [employer] and e-mail to Archana.

## 2025-06-12 NOTE — TELEPHONE ENCOUNTER
Per Dr. Reveles    Peripheral angiogram with possible intervention Left leg.      Will place order and send encounter

## 2025-06-12 NOTE — TELEPHONE ENCOUNTER
It appears they switched it from venous duplex to BLE arterial duplex as originally requested yesterday. No venous completed to r/o DVT. Per the preliminary arterial report, tech call Dr. Reveles to review.

## 2025-06-12 NOTE — TELEPHONE ENCOUNTER
Registrars  Patient went for venous duplex LLE today to rule out DVT however, her BLE arterial duplex is scheduled for after her visit with Dr. Reveles. That needs to be moved up, so that it is prior to Dr. Reveles's f/u. Any questions, let Haja or this RN know and thanks.

## 2025-06-12 NOTE — TELEPHONE ENCOUNTER
Yoly, Can you please schedule for peripheral angiogram with possible intervention left leg at [facility] the week of the June 23rd.      Please go for blood work one week prior to your procedure.       The morning of your procedure you will park in the hospital parking lot and report directly to the registration desk for check in.    Pre-Procedure Instructions   You will need to fast for at least 8 hours prior to procedure. No caffeine the morning of.    Hold all diabetic medications including, Metfomin.  If you take Lantus/Levemir only take ½ your normal dose the evening before.  All other medications can be taken in the morning with sips of water.   You will need to take 325 mg aspirin the morning of.  If you are currently taking 81 mg please take 4 tablets that morning.   Do not use any lotions, creams or perfume the morning of procedure.   Pre-procedure lab work will need to be completed 5-7 days prior to procedure.   Please have a responsible adult to drive you home after procedure. We advise you have someone to stay with you for 24 hours following procedure for precautionary measures. Depending on procedure you may require an overnight stay.   Cath lab will provide you with all post procedure instructions.     If you have any questions regarding the procedure itself or medications, please call [phone] and ask to speak with a nurse.

## 2025-06-12 NOTE — TELEPHONE ENCOUNTER
Per Dr. Reveles     Continue with venous and arterial dopplers.    Dr. Reveles spoke to vascular lab and they are aware of orders.      No new orders

## 2025-06-13 ENCOUNTER — RESULTS FOLLOW-UP (OUTPATIENT)
Dept: CARDIOLOGY CLINIC | Age: 60
End: 2025-06-13

## 2025-06-16 PROBLEM — M79.605 LEG PAIN, LEFT: Status: ACTIVE | Noted: 2025-06-12

## 2025-06-25 ENCOUNTER — HOSPITAL ENCOUNTER (OUTPATIENT)
Age: 60
Setting detail: OUTPATIENT SURGERY
Discharge: HOME | End: 2025-06-25
Attending: STUDENT IN AN ORGANIZED HEALTH CARE EDUCATION/TRAINING PROGRAM | Admitting: STUDENT IN AN ORGANIZED HEALTH CARE EDUCATION/TRAINING PROGRAM
Payer: COMMERCIAL

## 2025-06-25 VITALS
HEIGHT: 69 IN | WEIGHT: 154 LBS | OXYGEN SATURATION: 97 % | TEMPERATURE: 98.2 F | DIASTOLIC BLOOD PRESSURE: 78 MMHG | HEART RATE: 77 BPM | BODY MASS INDEX: 22.81 KG/M2 | SYSTOLIC BLOOD PRESSURE: 124 MMHG

## 2025-06-25 DIAGNOSIS — M79.605 LEG PAIN, LEFT: ICD-10-CM

## 2025-06-25 LAB
ANION GAP SERPL CALCULATED.3IONS-SCNC: 13 MMOL/L (ref 3–16)
BUN SERPL-MCNC: 25 MG/DL (ref 7–20)
CALCIUM SERPL-MCNC: 9.8 MG/DL (ref 8.3–10.6)
CHLORIDE SERPL-SCNC: 102 MMOL/L (ref 99–110)
CO2 SERPL-SCNC: 24 MMOL/L (ref 21–32)
CREAT SERPL-MCNC: 1.1 MG/DL (ref 0.6–1.1)
DEPRECATED RDW RBC AUTO: 15.2 % (ref 12.4–15.4)
GFR SERPLBLD CREATININE-BSD FMLA CKD-EPI: 58 ML/MIN/{1.73_M2}
GLUCOSE SERPL-MCNC: 286 MG/DL (ref 70–99)
HCT VFR BLD AUTO: 45 % (ref 36–48)
HGB BLD-MCNC: 15.3 G/DL (ref 12–16)
MCH RBC QN AUTO: 29.8 PG (ref 26–34)
MCHC RBC AUTO-ENTMCNC: 34 G/DL (ref 31–36)
MCV RBC AUTO: 87.7 FL (ref 80–100)
PLATELET # BLD AUTO: 325 K/UL (ref 135–450)
PMV BLD AUTO: 8.1 FL (ref 5–10.5)
POC ACT LR: 288 SEC
POTASSIUM SERPL-SCNC: 3.9 MMOL/L (ref 3.5–5.1)
RBC # BLD AUTO: 5.12 M/UL (ref 4–5.2)
SODIUM SERPL-SCNC: 139 MMOL/L (ref 136–145)
WBC # BLD AUTO: 10.3 K/UL (ref 4–11)

## 2025-06-25 PROCEDURE — C1760 CLOSURE DEV, VASC: HCPCS | Performed by: STUDENT IN AN ORGANIZED HEALTH CARE EDUCATION/TRAINING PROGRAM

## 2025-06-25 PROCEDURE — C1725 CATH, TRANSLUMIN NON-LASER: HCPCS | Performed by: STUDENT IN AN ORGANIZED HEALTH CARE EDUCATION/TRAINING PROGRAM

## 2025-06-25 PROCEDURE — 75710 ARTERY X-RAYS ARM/LEG: CPT | Performed by: STUDENT IN AN ORGANIZED HEALTH CARE EDUCATION/TRAINING PROGRAM

## 2025-06-25 PROCEDURE — 37230 HC TIB PER TERR STENT AND PLASTY: CPT | Performed by: STUDENT IN AN ORGANIZED HEALTH CARE EDUCATION/TRAINING PROGRAM

## 2025-06-25 PROCEDURE — 76937 US GUIDE VASCULAR ACCESS: CPT | Performed by: STUDENT IN AN ORGANIZED HEALTH CARE EDUCATION/TRAINING PROGRAM

## 2025-06-25 PROCEDURE — C1894 INTRO/SHEATH, NON-LASER: HCPCS | Performed by: STUDENT IN AN ORGANIZED HEALTH CARE EDUCATION/TRAINING PROGRAM

## 2025-06-25 PROCEDURE — C1887 CATHETER, GUIDING: HCPCS | Performed by: STUDENT IN AN ORGANIZED HEALTH CARE EDUCATION/TRAINING PROGRAM

## 2025-06-25 PROCEDURE — 75625 CONTRAST EXAM ABDOMINL AORTA: CPT | Performed by: STUDENT IN AN ORGANIZED HEALTH CARE EDUCATION/TRAINING PROGRAM

## 2025-06-25 PROCEDURE — 75774 ARTERY X-RAY EACH VESSEL: CPT | Performed by: STUDENT IN AN ORGANIZED HEALTH CARE EDUCATION/TRAINING PROGRAM

## 2025-06-25 PROCEDURE — 7100000010 HC PHASE II RECOVERY - FIRST 15 MIN: Performed by: STUDENT IN AN ORGANIZED HEALTH CARE EDUCATION/TRAINING PROGRAM

## 2025-06-25 PROCEDURE — 85347 COAGULATION TIME ACTIVATED: CPT

## 2025-06-25 PROCEDURE — 6370000000 HC RX 637 (ALT 250 FOR IP): Performed by: STUDENT IN AN ORGANIZED HEALTH CARE EDUCATION/TRAINING PROGRAM

## 2025-06-25 PROCEDURE — 37224 HC FEM POP TERRITORY PLASTY: CPT | Performed by: STUDENT IN AN ORGANIZED HEALTH CARE EDUCATION/TRAINING PROGRAM

## 2025-06-25 PROCEDURE — 2709999900 HC NON-CHARGEABLE SUPPLY: Performed by: STUDENT IN AN ORGANIZED HEALTH CARE EDUCATION/TRAINING PROGRAM

## 2025-06-25 PROCEDURE — C2623 CATH, TRANSLUMIN, DRUG-COAT: HCPCS | Performed by: STUDENT IN AN ORGANIZED HEALTH CARE EDUCATION/TRAINING PROGRAM

## 2025-06-25 PROCEDURE — 80048 BASIC METABOLIC PNL TOTAL CA: CPT

## 2025-06-25 PROCEDURE — 7100000011 HC PHASE II RECOVERY - ADDTL 15 MIN: Performed by: STUDENT IN AN ORGANIZED HEALTH CARE EDUCATION/TRAINING PROGRAM

## 2025-06-25 PROCEDURE — 6360000002 HC RX W HCPCS: Performed by: STUDENT IN AN ORGANIZED HEALTH CARE EDUCATION/TRAINING PROGRAM

## 2025-06-25 PROCEDURE — C1874 STENT, COATED/COV W/DEL SYS: HCPCS | Performed by: STUDENT IN AN ORGANIZED HEALTH CARE EDUCATION/TRAINING PROGRAM

## 2025-06-25 PROCEDURE — 99152 MOD SED SAME PHYS/QHP 5/>YRS: CPT | Performed by: STUDENT IN AN ORGANIZED HEALTH CARE EDUCATION/TRAINING PROGRAM

## 2025-06-25 PROCEDURE — C1769 GUIDE WIRE: HCPCS | Performed by: STUDENT IN AN ORGANIZED HEALTH CARE EDUCATION/TRAINING PROGRAM

## 2025-06-25 PROCEDURE — 85027 COMPLETE CBC AUTOMATED: CPT

## 2025-06-25 PROCEDURE — 99153 MOD SED SAME PHYS/QHP EA: CPT | Performed by: STUDENT IN AN ORGANIZED HEALTH CARE EDUCATION/TRAINING PROGRAM

## 2025-06-25 PROCEDURE — 6360000004 HC RX CONTRAST MEDICATION: Performed by: STUDENT IN AN ORGANIZED HEALTH CARE EDUCATION/TRAINING PROGRAM

## 2025-06-25 DEVICE — ESPRIT™ BTK EVEROLIMUS ELUTING RESORBABLE SCAFFOLD SYSTEM 3.00MM X 38MM RAPID-EXCHANGE
Type: IMPLANTABLE DEVICE | Status: FUNCTIONAL
Brand: ESPRIT™

## 2025-06-25 RX ORDER — FENTANYL CITRATE 50 UG/ML
INJECTION, SOLUTION INTRAMUSCULAR; INTRAVENOUS PRN
Status: DISCONTINUED | OUTPATIENT
Start: 2025-06-25 | End: 2025-06-25 | Stop reason: HOSPADM

## 2025-06-25 RX ORDER — SODIUM CHLORIDE 0.9 % (FLUSH) 0.9 %
5-40 SYRINGE (ML) INJECTION PRN
Status: DISCONTINUED | OUTPATIENT
Start: 2025-06-25 | End: 2025-06-25 | Stop reason: HOSPADM

## 2025-06-25 RX ORDER — SODIUM CHLORIDE 0.9 % (FLUSH) 0.9 %
5-40 SYRINGE (ML) INJECTION EVERY 12 HOURS SCHEDULED
Status: DISCONTINUED | OUTPATIENT
Start: 2025-06-25 | End: 2025-06-25 | Stop reason: HOSPADM

## 2025-06-25 RX ORDER — CLOPIDOGREL BISULFATE 75 MG/1
75 TABLET ORAL DAILY
Status: DISCONTINUED | OUTPATIENT
Start: 2025-06-25 | End: 2025-06-25 | Stop reason: HOSPADM

## 2025-06-25 RX ORDER — SODIUM CHLORIDE 9 MG/ML
INJECTION, SOLUTION INTRAVENOUS PRN
Status: DISCONTINUED | OUTPATIENT
Start: 2025-06-25 | End: 2025-06-25 | Stop reason: HOSPADM

## 2025-06-25 RX ORDER — HEPARIN SODIUM 1000 [USP'U]/ML
INJECTION, SOLUTION INTRAVENOUS; SUBCUTANEOUS PRN
Status: DISCONTINUED | OUTPATIENT
Start: 2025-06-25 | End: 2025-06-25 | Stop reason: HOSPADM

## 2025-06-25 RX ORDER — SODIUM CHLORIDE 9 MG/ML
INJECTION, SOLUTION INTRAVENOUS CONTINUOUS
Status: DISCONTINUED | OUTPATIENT
Start: 2025-06-25 | End: 2025-06-25 | Stop reason: HOSPADM

## 2025-06-25 RX ORDER — ACETAMINOPHEN 325 MG/1
650 TABLET ORAL EVERY 4 HOURS PRN
Status: DISCONTINUED | OUTPATIENT
Start: 2025-06-25 | End: 2025-06-25 | Stop reason: HOSPADM

## 2025-06-25 RX ORDER — IOPAMIDOL 755 MG/ML
INJECTION, SOLUTION INTRAVASCULAR PRN
Status: DISCONTINUED | OUTPATIENT
Start: 2025-06-25 | End: 2025-06-25 | Stop reason: HOSPADM

## 2025-06-25 RX ORDER — MIDAZOLAM HYDROCHLORIDE 1 MG/ML
INJECTION, SOLUTION INTRAMUSCULAR; INTRAVENOUS PRN
Status: DISCONTINUED | OUTPATIENT
Start: 2025-06-25 | End: 2025-06-25 | Stop reason: HOSPADM

## 2025-06-25 RX ADMIN — CLOPIDOGREL BISULFATE 75 MG: 75 TABLET, FILM COATED ORAL at 07:35

## 2025-06-25 NOTE — PROGRESS NOTES
Pt returns from procedure, site clean, dry, and intact.  No bleeding or hematoma  VSS, will continue to monitor    Pt tolerating PO fluids    Pt ambulated to the bathroom, tolerated well.  Site clean dry and intact.  No bleeding or hematoma    IV d/c'd, angio intact    Discharge instructions reviewed with patient and family member.  Patient and family verbalized understanding.  New medications have been reviewed, questions answered and patient voiced understanding. All medication side effects reviewed and patient and family verbalized understanding. Follow up appointment(s) reviewed with patient and family.       Patient given discharge instructions, and appointment times. Taken to discharge bridge via wheelchair.  Patient discharged to home with family

## 2025-06-25 NOTE — DISCHARGE INSTRUCTIONS
PERIPHERAL ANGIOGRAM    Care of your puncture site:  Remove bandage 24 hours after the procedure.  May shower in 24 hours but do not sit in a bathtub/pool of water for 5 days or until the wound is healed.  Gently clean groin using soap and water.  Dry thoroughly and apply a Band-Aid that covers the entire site. Use Band-Aid until skin heals over in about 3-5 days.  Do not apply powder or lotion.      Normal Observations:  Soreness or tenderness which may last one week.  Possible bruising that could last 2 weeks.    Activity:  You may resume driving 24 hours following the procedure.  Do not make important / legal decisions within 24 hours after procedure.  You may resume normal activity in 5 days or after the wound heals.  Avoid lifting more than 10 pounds for 5 days or until the wound heals.  Avoid strenuous exercise or activity for 1 week.      Nutrition:  Regular diet  Drink at least 8 to 10 glasses of decaffeinated, non-alcoholic fluid for the next 24 hours to flush the x-ray dye used for your angiogram out of your body.    Call your doctor immediately if your condition worsens, for any other concerns, for a follow-up appointment or if you experience any of the following:  Increased swelling on the groin or leg.  Unusual pain, numbness, or tingling of the groin or down the leg.  Any signs of infection such as: redness, yellow drainage at the site, swelling or pain.    IF GROIN STARTS BLEEDING SIGNIFICANTLY:   LAY FLAT, HOLD FIRM DIRECT PRESSURE, AND CALL 911

## 2025-06-25 NOTE — SEDATION DOCUMENTATION
Pre-Sedation:  Pre-Sedation Documentation and Exam:  I have personally completed a history, physical exam & review of systems for this patient (see notes).    Prior History of Anesthesia Complications:   none    Modified Mallampati:  III (soft palate, base of uvula visible)    ASA Classification:  Class 3 - A patient with severe systemic disease that limits activity but is not incapacitating    Primitivo Scale:  Activity:  2 - Able to move 4 extremities voluntarily on command  Respiration:  2 - Able to breathe deeply and cough freely  Circulation:  2 - BP+/- 20mmHg of normal  Consciousness:  2 - Fully awake  Oxygen Saturation (color):  2 - Able to maintain oxygen saturation >92% on room air    Sedation/Anesthesia Plan:  Guard the patient's safety and welfare.  Minimize physical discomfort and pain.  Minimize negative psychological responses to treatment by providing sedation and analgesia and maximize the potential amnesia.  Patient to meet pre-procedure discharge plan.    Medication Planned:  midazolam intravenously and fentanyl intravenously    Patient is an appropriate candidate for plan of sedation:   yes

## 2025-06-25 NOTE — H&P
H&P     Peripheral Arterial Disease  [place] 4 Claudication  - Abnormal arterial doppler, L sided severely abnormal GIACOMO  - Continue antiplatelet therapy  - Continue high potency statin      I have reviewed the history and physical and examined the patient and find no relevant changes. I have reviewed with the patient and/or family the risks, benefits, and alternatives to the procedure.    Pre-sedation Assessment    Patient:  Albino Uriostegui   :   1965    Intended Procedure:   Peripheral angiography with possible intervention    Vitals:    25 0730   BP: (!) 139/90   Pulse: 70   Temp: 98.2 °F (36.8 °C)   SpO2: 100%       Nursing notes reviewed and agreed.  Medications reviewed  Allergies: No Known Allergies      Pre-Procedure Assessment/Plan:  ASA 3 - Patient with moderate systemic disease with functional limitations    Mallampati Airway Assessment:  Mallampati Class III - (soft palate & base of uvula are visible)    Level of Sedation Plan:Moderate sedation    Post Procedure plan: Return to same level of care    Dustin Reveles MD  Interventional Cardiology

## 2025-06-28 LAB — ECHO BSA: 1.84 M2

## 2025-07-07 ENCOUNTER — OFFICE VISIT (OUTPATIENT)
Dept: CARDIOLOGY CLINIC | Age: 60
End: 2025-07-07
Payer: COMMERCIAL

## 2025-07-07 ENCOUNTER — TELEPHONE (OUTPATIENT)
Dept: CARDIOLOGY CLINIC | Age: 60
End: 2025-07-07

## 2025-07-07 VITALS
OXYGEN SATURATION: 96 % | HEART RATE: 89 BPM | SYSTOLIC BLOOD PRESSURE: 110 MMHG | HEIGHT: 69 IN | WEIGHT: 155 LBS | DIASTOLIC BLOOD PRESSURE: 58 MMHG | BODY MASS INDEX: 22.96 KG/M2

## 2025-07-07 DIAGNOSIS — M79.604 LEG PAIN, RIGHT: ICD-10-CM

## 2025-07-07 DIAGNOSIS — R07.9 CHEST PAIN, UNSPECIFIED TYPE: ICD-10-CM

## 2025-07-07 DIAGNOSIS — I99.8 ISCHEMIC FOOT: Primary | ICD-10-CM

## 2025-07-07 PROCEDURE — 99215 OFFICE O/P EST HI 40 MIN: CPT | Performed by: STUDENT IN AN ORGANIZED HEALTH CARE EDUCATION/TRAINING PROGRAM

## 2025-07-07 PROCEDURE — 3078F DIAST BP <80 MM HG: CPT | Performed by: STUDENT IN AN ORGANIZED HEALTH CARE EDUCATION/TRAINING PROGRAM

## 2025-07-07 PROCEDURE — G2211 COMPLEX E/M VISIT ADD ON: HCPCS | Performed by: STUDENT IN AN ORGANIZED HEALTH CARE EDUCATION/TRAINING PROGRAM

## 2025-07-07 PROCEDURE — 3074F SYST BP LT 130 MM HG: CPT | Performed by: STUDENT IN AN ORGANIZED HEALTH CARE EDUCATION/TRAINING PROGRAM

## 2025-07-07 NOTE — PATIENT INSTRUCTIONS
Sharon will call for Peripheral angiogram with possible intervention     Stress Test    Breathing spontaneous and unlabored. Breath sounds clear and equal bilaterally with regular rhythm.

## 2025-07-07 NOTE — TELEPHONE ENCOUNTER
Can you please schedule for peripheral angiogram with possible intervention right leg at [facility]     Please go for blood work one week prior to your procedure.       The morning of your procedure you will park in the hospital parking lot and report directly to the registration desk for check in.    Pre-Procedure Instructions   You will need to fast for at least 8 hours prior to procedure. No caffeine the morning of.   Hold all diabetic medications including, Metfomin.  If you take Lantus/Levemir only take ½ your normal dose the evening before.  All other medications can be taken in the morning with sips of water.   You will need to take 325 mg aspirin the morning of. Or, if you are currently taking 81 mg please take 4 tablets that morning.   Do not use any lotions, creams or perfume the morning of procedure.   Pre-procedure lab work will need to be completed 5-7 days prior to procedure.   Please have a responsible adult to drive you home after procedure. We advise you have someone to stay with you for 24 hours following procedure for precautionary measures. Depending on procedure you may require an overnight stay.   Cath lab will provide you with all post procedure instructions.     If you have any questions regarding the procedure itself or medications, please call [phone] and ask to speak with a nurse.

## 2025-07-07 NOTE — PROGRESS NOTES
[facility]      Cardiology Consult    Albino Uriostegui  1965 July 6, 2025    Referring Physician: Jade Unger APRN  Reason for Referral: Peripheral Follow up     CC: \"I am doing good\"    HPI:  The patient is 59 y.o. female with a past medical history significant for CAD,chest pain, depression, dm,HTN, Hyperlipidemia.    She stated she is not having any pain with walking.  Today she presents for follow up and states that overall she is feeling well. She denies any new sounding cardiac complaints. She denies any chest pains or worsening shortness of breath. She reports medication compliance and is tolerating. She denies any abnormal bleeding or bruising. She denies exertional chest pain/pressure, dyspnea at rest, worsening HERNANDEZ, PND, orthopnea, palpitations, lightheadedness, weight changes, changes in LE edema, and syncope.     Past Medical History:   Diagnosis Date    CAD (coronary artery disease)     multiple TIESHA    Chest pain     Nuc GXT 8/22/12 normal.  Echo 8/21/12 LVEF 60-65%    Depression     Diabetes mellitus (HCC)     managed by PCP    Family history of early CAD     Female pelvic pain 2009    HTN (hypertension)     controlled    Hyperlipidemia     rec semi annual lipids with LDL goal <70    Hypothyroidism     managed by PCP    Rectal bleeding 12/31/2014    Tobacco user     cessation discussed     Past Surgical History:   Procedure Laterality Date    CARDIAC PROCEDURE N/A 5/15/2024    Peripheral angiography performed by Dustin Reveles MD at [facility] CARDIAC CATH LAB    CARDIAC PROCEDURE N/A 6/25/2025    Peripheral angiography performed by Dustin Reveles MD at [facility] CARDIAC CATH LAB    CHOLECYSTECTOMY      CORONARY ANGIOPLASTY WITH STENT PLACEMENT      9/7/12: TIESHA ramus/lst diag, mid circ, 5/8/15: TIESHA to mid and prox LAD and left circ, 8/24/16: TIESHA to prox circ, 9/29/16: staged PCI to distal LAD and PDA    CORONARY ARTERY BYPASS GRAFT  10/30/2017    cabgx6 (Ivan)

## 2025-07-10 NOTE — TELEPHONE ENCOUNTER
Date of Procedure: Tuesday 7/29/25 @ [facility] with Dr. Reveles     Time of arrival: 7:30 am     Procedure time: 9:00 am     Spoke to Albino and she is agreeable to date and time. Reviewed instructions and she verbalized understanding. Encouraged to call with any questions or concerns.     Published on [employer] and e-mail to Archana.

## 2025-07-15 PROBLEM — M79.604 LEG PAIN, RIGHT: Status: ACTIVE | Noted: 2025-07-07

## 2025-07-18 ENCOUNTER — HOSPITAL ENCOUNTER (OUTPATIENT)
Dept: NUCLEAR MEDICINE | Age: 60
Discharge: HOME | End: 2025-07-18
Attending: STUDENT IN AN ORGANIZED HEALTH CARE EDUCATION/TRAINING PROGRAM
Payer: COMMERCIAL

## 2025-07-18 ENCOUNTER — HOSPITAL ENCOUNTER (OUTPATIENT)
Age: 60
Discharge: HOME | End: 2025-07-20
Attending: STUDENT IN AN ORGANIZED HEALTH CARE EDUCATION/TRAINING PROGRAM
Payer: COMMERCIAL

## 2025-07-18 DIAGNOSIS — R07.9 CHEST PAIN, UNSPECIFIED TYPE: ICD-10-CM

## 2025-07-18 LAB
NUC REST DIASTOLIC VOLUME INDEX: 88 ML/M2
NUC REST EJECTION FRACTION: 74 %
NUC REST SYSTOLIC VOLUME INDEX: 23 ML/M2
STRESS BASELINE DIAS BP: 59 MMHG
STRESS BASELINE HR: 75 BPM
STRESS BASELINE SYS BP: 119 MMHG
STRESS ESTIMATED WORKLOAD: 1 METS
STRESS EXERCISE DUR MIN: 1 MIN
STRESS EXERCISE DUR SEC: 40 SEC
STRESS PEAK DIAS BP: 59 MMHG
STRESS PEAK SYS BP: 119 MMHG
STRESS PERCENT HR ACHIEVED: 59 %
STRESS POST PEAK HR: 95 BPM
STRESS RATE PRESSURE PRODUCT: NORMAL BPM*MMHG
STRESS ST DEPRESSION: 0 MM
STRESS TARGET HR: 161 BPM

## 2025-07-18 PROCEDURE — 6360000002 HC RX W HCPCS: Performed by: STUDENT IN AN ORGANIZED HEALTH CARE EDUCATION/TRAINING PROGRAM

## 2025-07-18 PROCEDURE — A9502 TC99M TETROFOSMIN: HCPCS | Performed by: STUDENT IN AN ORGANIZED HEALTH CARE EDUCATION/TRAINING PROGRAM

## 2025-07-18 PROCEDURE — 78452 HT MUSCLE IMAGE SPECT MULT: CPT

## 2025-07-18 PROCEDURE — 3430000000 HC RX DIAGNOSTIC RADIOPHARMACEUTICAL: Performed by: STUDENT IN AN ORGANIZED HEALTH CARE EDUCATION/TRAINING PROGRAM

## 2025-07-18 PROCEDURE — 93017 CV STRESS TEST TRACING ONLY: CPT

## 2025-07-18 RX ORDER — REGADENOSON 0.08 MG/ML
0.4 INJECTION, SOLUTION INTRAVENOUS
Status: COMPLETED | OUTPATIENT
Start: 2025-07-18 | End: 2025-07-18

## 2025-07-18 RX ADMIN — REGADENOSON 0.4 MG: 0.08 INJECTION, SOLUTION INTRAVENOUS at 10:29

## 2025-07-18 RX ADMIN — TETROFOSMIN 30.5 MILLICURIE: 1.38 INJECTION, POWDER, LYOPHILIZED, FOR SOLUTION INTRAVENOUS at 10:33

## 2025-07-18 RX ADMIN — TETROFOSMIN 12.1 MILLICURIE: 1.38 INJECTION, POWDER, LYOPHILIZED, FOR SOLUTION INTRAVENOUS at 09:28

## 2025-07-21 LAB — POC ACT LR: 284 SEC

## 2025-07-22 RX ORDER — FENOFIBRATE 145 MG/1
145 TABLET, FILM COATED ORAL DAILY
Qty: 90 TABLET | Refills: 4 | Status: SHIPPED | OUTPATIENT
Start: 2025-07-22

## 2025-07-22 NOTE — TELEPHONE ENCOUNTER
Last OV: 7/7/25  Next OV: 10/13/25  Last refill: 3/11/25  Most recent Labs: Lipid panel & CMP 1/30/25  Last EKG (if needed):

## 2025-07-25 DIAGNOSIS — I99.8 ISCHEMIC FOOT: ICD-10-CM

## 2025-07-25 LAB
ANION GAP SERPL CALCULATED.3IONS-SCNC: 12 MMOL/L (ref 3–16)
BUN SERPL-MCNC: 29 MG/DL (ref 7–20)
CALCIUM SERPL-MCNC: 9.9 MG/DL (ref 8.3–10.6)
CHLORIDE SERPL-SCNC: 101 MMOL/L (ref 99–110)
CO2 SERPL-SCNC: 24 MMOL/L (ref 21–32)
CREAT SERPL-MCNC: 1 MG/DL (ref 0.6–1.1)
DEPRECATED RDW RBC AUTO: 14.4 % (ref 12.4–15.4)
GFR SERPLBLD CREATININE-BSD FMLA CKD-EPI: 65 ML/MIN/{1.73_M2}
GLUCOSE SERPL-MCNC: 292 MG/DL (ref 70–99)
HCT VFR BLD AUTO: 43.3 % (ref 36–48)
HGB BLD-MCNC: 14.5 G/DL (ref 12–16)
MCH RBC QN AUTO: 29.6 PG (ref 26–34)
MCHC RBC AUTO-ENTMCNC: 33.6 G/DL (ref 31–36)
MCV RBC AUTO: 88.1 FL (ref 80–100)
PLATELET # BLD AUTO: 309 K/UL (ref 135–450)
PMV BLD AUTO: 8.1 FL (ref 5–10.5)
POTASSIUM SERPL-SCNC: 4.6 MMOL/L (ref 3.5–5.1)
RBC # BLD AUTO: 4.91 M/UL (ref 4–5.2)
SODIUM SERPL-SCNC: 137 MMOL/L (ref 136–145)
WBC # BLD AUTO: 8.3 K/UL (ref 4–11)

## 2025-07-29 ENCOUNTER — HOSPITAL ENCOUNTER (OUTPATIENT)
Age: 60
Setting detail: OUTPATIENT SURGERY
Discharge: HOME | End: 2025-07-29
Attending: STUDENT IN AN ORGANIZED HEALTH CARE EDUCATION/TRAINING PROGRAM | Admitting: STUDENT IN AN ORGANIZED HEALTH CARE EDUCATION/TRAINING PROGRAM
Payer: COMMERCIAL

## 2025-07-29 VITALS
RESPIRATION RATE: 20 BRPM | WEIGHT: 155 LBS | OXYGEN SATURATION: 96 % | HEART RATE: 67 BPM | BODY MASS INDEX: 22.96 KG/M2 | HEIGHT: 69 IN | SYSTOLIC BLOOD PRESSURE: 142 MMHG | DIASTOLIC BLOOD PRESSURE: 76 MMHG | TEMPERATURE: 98.2 F

## 2025-07-29 DIAGNOSIS — M79.604 LEG PAIN, RIGHT: ICD-10-CM

## 2025-07-29 LAB — ECHO BSA: 1.85 M2

## 2025-07-29 PROCEDURE — 7100000010 HC PHASE II RECOVERY - FIRST 15 MIN: Performed by: STUDENT IN AN ORGANIZED HEALTH CARE EDUCATION/TRAINING PROGRAM

## 2025-07-29 PROCEDURE — C1751 CATH, INF, PER/CENT/MIDLINE: HCPCS | Performed by: STUDENT IN AN ORGANIZED HEALTH CARE EDUCATION/TRAINING PROGRAM

## 2025-07-29 PROCEDURE — C1769 GUIDE WIRE: HCPCS | Performed by: STUDENT IN AN ORGANIZED HEALTH CARE EDUCATION/TRAINING PROGRAM

## 2025-07-29 PROCEDURE — C1760 CLOSURE DEV, VASC: HCPCS | Performed by: STUDENT IN AN ORGANIZED HEALTH CARE EDUCATION/TRAINING PROGRAM

## 2025-07-29 PROCEDURE — 75710 ARTERY X-RAYS ARM/LEG: CPT | Performed by: STUDENT IN AN ORGANIZED HEALTH CARE EDUCATION/TRAINING PROGRAM

## 2025-07-29 PROCEDURE — 37228 HC TIB PER TERRITORY PLASTY: CPT | Performed by: STUDENT IN AN ORGANIZED HEALTH CARE EDUCATION/TRAINING PROGRAM

## 2025-07-29 PROCEDURE — C1894 INTRO/SHEATH, NON-LASER: HCPCS | Performed by: STUDENT IN AN ORGANIZED HEALTH CARE EDUCATION/TRAINING PROGRAM

## 2025-07-29 PROCEDURE — 37224 HC FEM POP TERRITORY PLASTY: CPT | Performed by: STUDENT IN AN ORGANIZED HEALTH CARE EDUCATION/TRAINING PROGRAM

## 2025-07-29 PROCEDURE — 99153 MOD SED SAME PHYS/QHP EA: CPT | Performed by: STUDENT IN AN ORGANIZED HEALTH CARE EDUCATION/TRAINING PROGRAM

## 2025-07-29 PROCEDURE — 6360000004 HC RX CONTRAST MEDICATION: Performed by: STUDENT IN AN ORGANIZED HEALTH CARE EDUCATION/TRAINING PROGRAM

## 2025-07-29 PROCEDURE — C1725 CATH, TRANSLUMIN NON-LASER: HCPCS | Performed by: STUDENT IN AN ORGANIZED HEALTH CARE EDUCATION/TRAINING PROGRAM

## 2025-07-29 PROCEDURE — 6370000000 HC RX 637 (ALT 250 FOR IP): Performed by: STUDENT IN AN ORGANIZED HEALTH CARE EDUCATION/TRAINING PROGRAM

## 2025-07-29 PROCEDURE — 75774 ARTERY X-RAY EACH VESSEL: CPT | Performed by: STUDENT IN AN ORGANIZED HEALTH CARE EDUCATION/TRAINING PROGRAM

## 2025-07-29 PROCEDURE — 99152 MOD SED SAME PHYS/QHP 5/>YRS: CPT | Performed by: STUDENT IN AN ORGANIZED HEALTH CARE EDUCATION/TRAINING PROGRAM

## 2025-07-29 PROCEDURE — 85347 COAGULATION TIME ACTIVATED: CPT

## 2025-07-29 PROCEDURE — C1887 CATHETER, GUIDING: HCPCS | Performed by: STUDENT IN AN ORGANIZED HEALTH CARE EDUCATION/TRAINING PROGRAM

## 2025-07-29 PROCEDURE — 7100000011 HC PHASE II RECOVERY - ADDTL 15 MIN: Performed by: STUDENT IN AN ORGANIZED HEALTH CARE EDUCATION/TRAINING PROGRAM

## 2025-07-29 PROCEDURE — 2720000010 HC SURG SUPPLY STERILE: Performed by: STUDENT IN AN ORGANIZED HEALTH CARE EDUCATION/TRAINING PROGRAM

## 2025-07-29 PROCEDURE — 6360000002 HC RX W HCPCS: Performed by: STUDENT IN AN ORGANIZED HEALTH CARE EDUCATION/TRAINING PROGRAM

## 2025-07-29 PROCEDURE — 2500000003 HC RX 250 WO HCPCS: Performed by: STUDENT IN AN ORGANIZED HEALTH CARE EDUCATION/TRAINING PROGRAM

## 2025-07-29 PROCEDURE — 2709999900 HC NON-CHARGEABLE SUPPLY: Performed by: STUDENT IN AN ORGANIZED HEALTH CARE EDUCATION/TRAINING PROGRAM

## 2025-07-29 PROCEDURE — 76937 US GUIDE VASCULAR ACCESS: CPT | Performed by: STUDENT IN AN ORGANIZED HEALTH CARE EDUCATION/TRAINING PROGRAM

## 2025-07-29 PROCEDURE — 2580000003 HC RX 258: Performed by: STUDENT IN AN ORGANIZED HEALTH CARE EDUCATION/TRAINING PROGRAM

## 2025-07-29 PROCEDURE — C2623 CATH, TRANSLUMIN, DRUG-COAT: HCPCS | Performed by: STUDENT IN AN ORGANIZED HEALTH CARE EDUCATION/TRAINING PROGRAM

## 2025-07-29 RX ORDER — SODIUM CHLORIDE 0.9 % (FLUSH) 0.9 %
5-40 SYRINGE (ML) INJECTION EVERY 12 HOURS SCHEDULED
Status: DISCONTINUED | OUTPATIENT
Start: 2025-07-29 | End: 2025-07-29 | Stop reason: HOSPADM

## 2025-07-29 RX ORDER — LIDOCAINE HYDROCHLORIDE 10 MG/ML
INJECTION, SOLUTION INFILTRATION; PERINEURAL PRN
Status: DISCONTINUED | OUTPATIENT
Start: 2025-07-29 | End: 2025-07-29 | Stop reason: HOSPADM

## 2025-07-29 RX ORDER — SODIUM CHLORIDE 9 MG/ML
INJECTION, SOLUTION INTRAVENOUS PRN
Status: DISCONTINUED | OUTPATIENT
Start: 2025-07-29 | End: 2025-07-29 | Stop reason: HOSPADM

## 2025-07-29 RX ORDER — MIDAZOLAM HYDROCHLORIDE 1 MG/ML
INJECTION, SOLUTION INTRAMUSCULAR; INTRAVENOUS PRN
Status: DISCONTINUED | OUTPATIENT
Start: 2025-07-29 | End: 2025-07-29 | Stop reason: HOSPADM

## 2025-07-29 RX ORDER — IOPAMIDOL 755 MG/ML
INJECTION, SOLUTION INTRAVASCULAR PRN
Status: DISCONTINUED | OUTPATIENT
Start: 2025-07-29 | End: 2025-07-29 | Stop reason: HOSPADM

## 2025-07-29 RX ORDER — CLOPIDOGREL BISULFATE 75 MG/1
75 TABLET ORAL ONCE
Status: COMPLETED | OUTPATIENT
Start: 2025-07-29 | End: 2025-07-29

## 2025-07-29 RX ORDER — SODIUM CHLORIDE 0.9 % (FLUSH) 0.9 %
5-40 SYRINGE (ML) INJECTION PRN
Status: DISCONTINUED | OUTPATIENT
Start: 2025-07-29 | End: 2025-07-29 | Stop reason: HOSPADM

## 2025-07-29 RX ORDER — HEPARIN SODIUM 1000 [USP'U]/ML
INJECTION, SOLUTION INTRAVENOUS; SUBCUTANEOUS PRN
Status: DISCONTINUED | OUTPATIENT
Start: 2025-07-29 | End: 2025-07-29 | Stop reason: HOSPADM

## 2025-07-29 RX ADMIN — Medication 10 ML: at 07:38

## 2025-07-29 RX ADMIN — SODIUM CHLORIDE: 0.9 INJECTION, SOLUTION INTRAVENOUS at 07:38

## 2025-07-29 RX ADMIN — CLOPIDOGREL BISULFATE 75 MG: 75 TABLET, FILM COATED ORAL at 07:48

## 2025-07-29 NOTE — FLOWSHEET NOTE
Patient ambulated around department with DAJUAN Kim RN. Left groin remained stable, soft, no drainage or hematoma.

## 2025-07-29 NOTE — DISCHARGE INSTRUCTIONS
PERIPHERAL ANGIOGRAM    Care of your puncture site:  Remove bandage 24 hours after the procedure.  25  May shower in 24 hours but do not sit in a bathtub/pool of water for 5 days or until the wound is healed.  Gently clean groin using soap and water.  Dry thoroughly and apply a Band-Aid that covers the entire site. Use Band-Aid until skin heals over in about 3-5 days.  Do not apply powder or lotion.      Normal Observations:  Soreness or tenderness which may last one week.  Possible bruising that could last 2 weeks.    Activity:  You may resume driving 24 hours following the procedure.  Do not make important / legal decisions within 24 hours after procedure.  You may resume normal activity in 5 days or after the wound heals.  Avoid lifting more than 10 pounds for 5 days or until the wound heals.  Avoid strenuous exercise or activity for 1 week.      Nutrition:  Regular diet  Drink at least 8 to 10 glasses of decaffeinated, non-alcoholic fluid for the next 24 hours to flush the x-ray dye used for your angiogram out of your body.    Call your doctor immediately if your condition worsens, for any other concerns, for a follow-up appointment or if you experience any of the [phone]  Increased swelling on the groin or leg.  Unusual pain, numbness, or tingling of the groin or down the leg.  Any signs of infection such as: redness, yellow drainage at the site, swelling or pain.    IF GROIN STARTS BLEEDING SIGNIFICANTLY:   LAY FLAT, HOLD FIRM DIRECT PRESSURE, AND CALL [phone]

## 2025-07-29 NOTE — PROGRESS NOTES
Discharge instructions reviewed with patient and family member.  Patient and family verbalized understanding.  . Follow up appointment(s) reviewed with patient and family.   Patient given discharge instructions, and appointment times.

## 2025-07-29 NOTE — H&P
H&P     RF4 claudication    I have reviewed the history and physical and examined the patient and find no relevant changes. I have reviewed with the patient and/or family the risks, benefits, and alternatives to the procedure.    Pre-sedation Assessment    Patient:  Albino Uriostegui   :   1965    Intended Procedure: peripheral angiography    Vitals:    25 0735   BP: (!) 147/75   Pulse: 86   Resp: 26   Temp: 98.2 °F (36.8 °C)   SpO2: 96%       Nursing notes reviewed and agreed.  Medications reviewed  Allergies: No Known Allergies      Pre-Procedure Assessment/Plan:  ASA 3 - Patient with moderate systemic disease with functional limitations    Mallampati Airway Assessment:  Mallampati Class III - (soft palate & base of uvula are visible)    Level of Sedation Plan:Moderate sedation    Post Procedure plan: Return to same level of care    Dustin Reveles MD  Interventional Cardiology

## 2025-07-29 NOTE — PROGRESS NOTES
Patient ambulated to Room 5 and prepped for procedure.  Family is with patient.  Questions and concerns addressed.

## 2025-07-29 NOTE — FLOWSHEET NOTE
Ambulated in hallway and to bathroom without difficulty. Left groin site without hematoma or bleeding.

## 2025-07-29 NOTE — FLOWSHEET NOTE
Patient received post procedure in stable condition.   Post-cath handoff/site assessment performed to left groin   Pt is alert and oriented x4, No distress noted. VSS-see flowsheet.   Post-cath restrictions/instructions provided  Patient provided with snack/drink.   No further needs at this time, call light within reach.   Dr. Reveles speaking with patient and family regarding procedure and results. Questions and concerns addressed

## 2025-08-11 LAB — ECHO BSA: 1.85 M2

## 2025-08-14 LAB — POC ACT LR: 280 SEC

## (undated) DEVICE — ARMADA 14 PTA CATHETER 3.0 MM X 200 MM X 150 CM / OVER-THE-WIRE: Brand: ARMADA

## (undated) DEVICE — RADIFOCUS GLIDEWIRE ADVANTAGE GUIDEWIRE: Brand: GLIDEWIRE ADVANTAGE

## (undated) DEVICE — SHEATH INTRO 6FR L10CM MINI GWIRE L45CM 0.035IN COR KINK

## (undated) DEVICE — GUIDEWIRE VASC L150CM DIA0.035IN FLX END L7CM J 3MM PTFE

## (undated) DEVICE — Device

## (undated) DEVICE — INTRODUCER SHTH 0.018 IN 4 FRX40 CM KT SFT TIP NIT SS VSI

## (undated) DEVICE — CATHETER ANGIO 5FR L65CM 0.035IN VIS OMNI FLUSH-0 SFT TIP

## (undated) DEVICE — DESTINATION PERIPHERAL GUIDING SHEATH: Brand: DESTINATION

## (undated) DEVICE — CATHETER SHOCKWAVE E8 IVL 6.0X80 MM

## (undated) DEVICE — HI-TORQUE COMMAND ES GUIDE WIRE .014" 300 CM: Brand: HI-TORQUE COMMAND

## (undated) DEVICE — PINNACLE INTRODUCER SHEATH: Brand: PINNACLE

## (undated) DEVICE — GUIDEWIRE VASC L260CM DIA0.035IN TIP L5CM PERIPH NIT

## (undated) DEVICE — CANISTER TRUVIC

## (undated) DEVICE — CXI SUPPORT CATHETER: Brand: CXI

## (undated) DEVICE — CATHETER ANGIO 5FR L100CM GRY S STL NYL JR4 3 SEG BRAID L

## (undated) DEVICE — PACLITAXEL-COATED PTA BALLOON CATHETER: Brand: RANGER™

## (undated) DEVICE — TUBE SET TRUVIC